# Patient Record
Sex: FEMALE | Race: ASIAN | NOT HISPANIC OR LATINO | URBAN - METROPOLITAN AREA
[De-identification: names, ages, dates, MRNs, and addresses within clinical notes are randomized per-mention and may not be internally consistent; named-entity substitution may affect disease eponyms.]

---

## 2020-02-17 ENCOUNTER — EMERGENCY (EMERGENCY)
Facility: HOSPITAL | Age: 35
LOS: 1 days | Discharge: HOME | End: 2020-02-17
Attending: STUDENT IN AN ORGANIZED HEALTH CARE EDUCATION/TRAINING PROGRAM
Payer: COMMERCIAL

## 2020-02-17 VITALS
HEART RATE: 71 BPM | OXYGEN SATURATION: 100 % | RESPIRATION RATE: 18 BRPM | SYSTOLIC BLOOD PRESSURE: 106 MMHG | DIASTOLIC BLOOD PRESSURE: 74 MMHG

## 2020-02-17 VITALS
WEIGHT: 106.04 LBS | OXYGEN SATURATION: 100 % | DIASTOLIC BLOOD PRESSURE: 78 MMHG | TEMPERATURE: 98 F | RESPIRATION RATE: 20 BRPM | HEART RATE: 93 BPM | SYSTOLIC BLOOD PRESSURE: 115 MMHG | HEIGHT: 61 IN

## 2020-02-17 PROCEDURE — 99283 EMERGENCY DEPT VISIT LOW MDM: CPT

## 2020-02-17 RX ORDER — IBUPROFEN 200 MG
600 TABLET ORAL ONCE
Refills: 0 | Status: COMPLETED | OUTPATIENT
Start: 2020-02-17 | End: 2020-02-17

## 2020-02-17 RX ORDER — ACETAMINOPHEN 500 MG
975 TABLET ORAL ONCE
Refills: 0 | Status: COMPLETED | OUTPATIENT
Start: 2020-02-17 | End: 2020-02-17

## 2020-02-17 RX ADMIN — Medication 975 MILLIGRAM(S): at 16:53

## 2020-02-17 RX ADMIN — Medication 600 MILLIGRAM(S): at 16:53
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
## 2020-02-17 NOTE — ED PROVIDER NOTE - PHYSICAL EXAMINATION
A&Ox3, NAD. NCAT. PERRL, EOMI. Neck supple, no LAD. Lungs CTAB. +S1S2, RRR, No m/r/g. Abd soft, NT/ND, +BS, no rebound or guarding. Extremities: cap refill <2, pulses in distal extremities 4+, no edema. Skin without rash. CN II-XII intact. Strength 5/5 UE/LE. Sensations intact throughout. Gait steady. no pronator drift, No cervical/thoracic/lumbar vertebral body pain to palpation, C-spine with FROM.
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
## 2020-02-17 NOTE — ED PROVIDER NOTE - PATIENT PORTAL LINK FT
You can access the FollowMyHealth Patient Portal offered by [facility] by registering at the following website: [email or web address]. By joining [employer]’s FollowMyHealth portal, you will also be able to view your health information using other applications (apps) compatible with our system.

## 2020-02-17 NOTE — ED PROVIDER NOTE - NSFOLLOWUPINSTRUCTIONS_ED_ALL_ED_FT
- stay hydrated. ice 20mins on, 40mins off in cycle.  - alternate between tylenol 975mg and ibuprofen 600mg every 6 hours as needed for pain-take with meals.  - follow up with your pcp in 1-2 days.    - return if symptoms worsen, fever, weakness, numbness/tingling, blurred vision, difficulty ambulating and all other concerns.

## 2022-02-07 ENCOUNTER — APPOINTMENT (OUTPATIENT)
Dept: OBGYN | Facility: CLINIC | Age: 37
End: 2022-02-07
Payer: MEDICAID

## 2022-02-07 ENCOUNTER — OUTPATIENT (OUTPATIENT)
Dept: OUTPATIENT SERVICES | Facility: HOSPITAL | Age: 37
LOS: 1 days | End: 2022-02-07
Payer: MEDICAID

## 2022-02-07 ENCOUNTER — TRANSCRIPTION ENCOUNTER (OUTPATIENT)
Age: 37
End: 2022-02-07

## 2022-02-07 VITALS
OXYGEN SATURATION: 100 % | SYSTOLIC BLOOD PRESSURE: 117 MMHG | DIASTOLIC BLOOD PRESSURE: 80 MMHG | BODY MASS INDEX: 21.71 KG/M2 | HEIGHT: 61 IN | HEART RATE: 100 BPM | RESPIRATION RATE: 16 BRPM | WEIGHT: 115 LBS | TEMPERATURE: 99.1 F

## 2022-02-07 DIAGNOSIS — Z78.9 OTHER SPECIFIED HEALTH STATUS: ICD-10-CM

## 2022-02-07 DIAGNOSIS — Z82.49 FAMILY HISTORY OF ISCHEMIC HEART DISEASE AND OTHER DISEASES OF THE CIRCULATORY SYSTEM: ICD-10-CM

## 2022-02-07 DIAGNOSIS — Z00.00 ENCOUNTER FOR GENERAL ADULT MEDICAL EXAMINATION WITHOUT ABNORMAL FINDINGS: ICD-10-CM

## 2022-02-07 DIAGNOSIS — Z83.3 FAMILY HISTORY OF DIABETES MELLITUS: ICD-10-CM

## 2022-02-07 DIAGNOSIS — Z87.09 PERSONAL HISTORY OF OTHER DISEASES OF THE RESPIRATORY SYSTEM: ICD-10-CM

## 2022-02-07 PROCEDURE — 36415 COLL VENOUS BLD VENIPUNCTURE: CPT

## 2022-02-07 PROCEDURE — 86900 BLOOD TYPING SEROLOGIC ABO: CPT

## 2022-02-07 PROCEDURE — 87086 URINE CULTURE/COLONY COUNT: CPT

## 2022-02-07 PROCEDURE — 81025 URINE PREGNANCY TEST: CPT

## 2022-02-07 PROCEDURE — G0463: CPT

## 2022-02-07 PROCEDURE — 86901 BLOOD TYPING SEROLOGIC RH(D): CPT

## 2022-02-07 PROCEDURE — 99204 OFFICE O/P NEW MOD 45 MIN: CPT | Mod: 25

## 2022-02-07 PROCEDURE — 84702 CHORIONIC GONADOTROPIN TEST: CPT

## 2022-02-07 PROCEDURE — 85025 COMPLETE CBC W/AUTO DIFF WBC: CPT

## 2022-02-07 PROCEDURE — 86850 RBC ANTIBODY SCREEN: CPT

## 2022-02-07 PROCEDURE — 36415 COLL VENOUS BLD VENIPUNCTURE: CPT | Mod: NC

## 2022-02-07 RX ORDER — MULTIVIT-MIN/FOLIC/VIT K/LYCOP 400-300MCG
28-0.8 TABLET ORAL
Refills: 0 | Status: COMPLETED | COMMUNITY
End: 2022-02-07

## 2022-02-08 LAB
ABO + RH PNL BLD: NORMAL
BASOPHILS # BLD AUTO: 0.04 K/UL
BASOPHILS NFR BLD AUTO: 0.5 %
BLD GP AB SCN SERPL QL: NORMAL
C TRACH RRNA SPEC QL NAA+PROBE: NOT DETECTED
EOSINOPHIL # BLD AUTO: 0.11 K/UL
EOSINOPHIL NFR BLD AUTO: 1.4 %
HCG SERPL-MCNC: 2981 MIU/ML
HCT VFR BLD CALC: 42.2 %
HGB BLD-MCNC: 13.8 G/DL
IMM GRANULOCYTES NFR BLD AUTO: 0.1 %
LYMPHOCYTES # BLD AUTO: 2.02 K/UL
LYMPHOCYTES NFR BLD AUTO: 24.8 %
MAN DIFF?: NORMAL
MCHC RBC-ENTMCNC: 30.3 PG
MCHC RBC-ENTMCNC: 32.7 GM/DL
MCV RBC AUTO: 92.5 FL
MONOCYTES # BLD AUTO: 0.67 K/UL
MONOCYTES NFR BLD AUTO: 8.2 %
N GONORRHOEA RRNA SPEC QL NAA+PROBE: NOT DETECTED
NEUTROPHILS # BLD AUTO: 5.29 K/UL
NEUTROPHILS NFR BLD AUTO: 65 %
PLATELET # BLD AUTO: 220 K/UL
RBC # BLD: 4.56 M/UL
RBC # FLD: 13.2 %
SOURCE AMPLIFICATION: NORMAL
WBC # FLD AUTO: 8.14 K/UL

## 2022-02-09 ENCOUNTER — OUTPATIENT (OUTPATIENT)
Dept: OUTPATIENT SERVICES | Facility: HOSPITAL | Age: 37
LOS: 1 days | End: 2022-02-09
Payer: MEDICAID

## 2022-02-09 DIAGNOSIS — Z01.419 ENCOUNTER FOR GYNECOLOGICAL EXAMINATION (GENERAL) (ROUTINE) WITHOUT ABNORMAL FINDINGS: ICD-10-CM

## 2022-02-09 DIAGNOSIS — Z3A.01 LESS THAN 8 WEEKS GESTATION OF PREGNANCY: ICD-10-CM

## 2022-02-09 DIAGNOSIS — N93.9 ABNORMAL UTERINE AND VAGINAL BLEEDING, UNSPECIFIED: ICD-10-CM

## 2022-02-09 DIAGNOSIS — Z78.9 OTHER SPECIFIED HEALTH STATUS: ICD-10-CM

## 2022-02-09 DIAGNOSIS — Z34.90 ENCOUNTER FOR SUPERVISION OF NORMAL PREGNANCY, UNSPECIFIED, UNSPECIFIED TRIMESTER: ICD-10-CM

## 2022-02-09 DIAGNOSIS — Z11.3 ENCOUNTER FOR SCREENING FOR INFECTIONS WITH A PREDOMINANTLY SEXUAL MODE OF TRANSMISSION: ICD-10-CM

## 2022-02-09 DIAGNOSIS — R10.2 PELVIC AND PERINEAL PAIN: ICD-10-CM

## 2022-02-09 LAB — BACTERIA UR CULT: NORMAL

## 2022-02-09 PROCEDURE — 84702 CHORIONIC GONADOTROPIN TEST: CPT

## 2022-02-09 NOTE — HISTORY OF PRESENT ILLNESS
[Normal Amount/Duration] :  normal amount and duration [Regular Cycle Intervals] : periods have been regular [No] : Patient does not have concerns regarding sex [Currently Active] : currently active [FreeTextEntry1] : 1/3/2022

## 2022-02-10 LAB — HCG SERPL-MCNC: 5715 MIU/ML

## 2022-02-14 ENCOUNTER — OUTPATIENT (OUTPATIENT)
Dept: OUTPATIENT SERVICES | Facility: HOSPITAL | Age: 37
LOS: 1 days | End: 2022-02-14
Payer: MEDICAID

## 2022-02-14 DIAGNOSIS — Z00.00 ENCOUNTER FOR GENERAL ADULT MEDICAL EXAMINATION WITHOUT ABNORMAL FINDINGS: ICD-10-CM

## 2022-02-14 PROCEDURE — 84702 CHORIONIC GONADOTROPIN TEST: CPT

## 2022-02-16 ENCOUNTER — OUTPATIENT (OUTPATIENT)
Dept: OUTPATIENT SERVICES | Facility: HOSPITAL | Age: 37
LOS: 1 days | End: 2022-02-16
Payer: MEDICAID

## 2022-02-16 DIAGNOSIS — Z00.00 ENCOUNTER FOR GENERAL ADULT MEDICAL EXAMINATION WITHOUT ABNORMAL FINDINGS: ICD-10-CM

## 2022-02-16 LAB — HCG SERPL-MCNC: ABNORMAL MIU/ML

## 2022-02-16 PROCEDURE — 84702 CHORIONIC GONADOTROPIN TEST: CPT

## 2022-02-17 LAB — HCG SERPL-MCNC: ABNORMAL MIU/ML

## 2022-02-18 ENCOUNTER — APPOINTMENT (OUTPATIENT)
Dept: ULTRASOUND IMAGING | Facility: HOSPITAL | Age: 37
End: 2022-02-18
Payer: MEDICAID

## 2022-02-18 ENCOUNTER — OUTPATIENT (OUTPATIENT)
Dept: OUTPATIENT SERVICES | Facility: HOSPITAL | Age: 37
LOS: 1 days | End: 2022-02-18
Payer: MEDICAID

## 2022-02-18 DIAGNOSIS — Z3A.01 LESS THAN 8 WEEKS GESTATION OF PREGNANCY: ICD-10-CM

## 2022-02-18 PROCEDURE — 76856 US EXAM PELVIC COMPLETE: CPT | Mod: 26

## 2022-02-18 PROCEDURE — 76817 TRANSVAGINAL US OBSTETRIC: CPT

## 2022-02-18 PROCEDURE — 76830 TRANSVAGINAL US NON-OB: CPT | Mod: 26

## 2022-02-18 PROCEDURE — 76815 OB US LIMITED FETUS(S): CPT

## 2022-02-18 PROCEDURE — 76830 TRANSVAGINAL US NON-OB: CPT

## 2022-02-18 PROCEDURE — 76856 US EXAM PELVIC COMPLETE: CPT

## 2022-02-22 ENCOUNTER — OUTPATIENT (OUTPATIENT)
Dept: OUTPATIENT SERVICES | Facility: HOSPITAL | Age: 37
LOS: 1 days | End: 2022-02-22
Payer: MEDICAID

## 2022-02-22 DIAGNOSIS — Z00.00 ENCOUNTER FOR GENERAL ADULT MEDICAL EXAMINATION WITHOUT ABNORMAL FINDINGS: ICD-10-CM

## 2022-02-22 PROCEDURE — 84702 CHORIONIC GONADOTROPIN TEST: CPT

## 2022-02-23 ENCOUNTER — OUTPATIENT (OUTPATIENT)
Dept: OUTPATIENT SERVICES | Facility: HOSPITAL | Age: 37
LOS: 1 days | End: 2022-02-23
Payer: MEDICAID

## 2022-02-23 ENCOUNTER — APPOINTMENT (OUTPATIENT)
Dept: OBGYN | Facility: CLINIC | Age: 37
End: 2022-02-23
Payer: MEDICAID

## 2022-02-23 VITALS
OXYGEN SATURATION: 97 % | HEIGHT: 61 IN | HEART RATE: 102 BPM | RESPIRATION RATE: 18 BRPM | TEMPERATURE: 97.1 F | WEIGHT: 117 LBS | BODY MASS INDEX: 22.09 KG/M2 | DIASTOLIC BLOOD PRESSURE: 70 MMHG | SYSTOLIC BLOOD PRESSURE: 112 MMHG

## 2022-02-23 DIAGNOSIS — Z00.00 ENCOUNTER FOR GENERAL ADULT MEDICAL EXAMINATION WITHOUT ABNORMAL FINDINGS: ICD-10-CM

## 2022-02-23 LAB — HCG SERPL-MCNC: ABNORMAL MIU/ML

## 2022-02-23 PROCEDURE — 99214 OFFICE O/P EST MOD 30 MIN: CPT

## 2022-02-23 PROCEDURE — G0463: CPT

## 2022-02-23 NOTE — HISTORY OF PRESENT ILLNESS
[FreeTextEntry1] : Presents for f/u of serial bHCG weekly and Pelvic ultrasound (02/18/22):\par \par Serial bHCG increasing appropriately - last drawn(02/23/22)~67,851 mIU/mL\par \par Pelvic ultrasound(02/18/22) (+) IUP ~ 5.6 weeks with ROXANN(10/14/22) consistent with ROXANN by LMP - results reviewed today with patient and reassured of findings. \par \par Patient denies any further vaginal bleeding since last Gyn visit, taking PNV daily

## 2022-02-24 DIAGNOSIS — Z71.2 PERSON CONSULTING FOR EXPLANATION OF EXAMINATION OR TEST FINDINGS: ICD-10-CM

## 2022-02-24 DIAGNOSIS — Z3A.01 LESS THAN 8 WEEKS GESTATION OF PREGNANCY: ICD-10-CM

## 2022-03-15 ENCOUNTER — APPOINTMENT (OUTPATIENT)
Dept: ANTEPARTUM | Facility: CLINIC | Age: 37
End: 2022-03-15
Payer: MEDICAID

## 2022-03-15 ENCOUNTER — ASOB RESULT (OUTPATIENT)
Age: 37
End: 2022-03-15

## 2022-03-15 PROCEDURE — 76815 OB US LIMITED FETUS(S): CPT

## 2022-03-23 ENCOUNTER — APPOINTMENT (OUTPATIENT)
Dept: OBGYN | Facility: CLINIC | Age: 37
End: 2022-03-23
Payer: MEDICAID

## 2022-03-23 ENCOUNTER — OUTPATIENT (OUTPATIENT)
Dept: OUTPATIENT SERVICES | Facility: HOSPITAL | Age: 37
LOS: 1 days | End: 2022-03-23
Payer: MEDICAID

## 2022-03-23 VITALS
SYSTOLIC BLOOD PRESSURE: 122 MMHG | HEIGHT: 61 IN | HEART RATE: 108 BPM | BODY MASS INDEX: 23.03 KG/M2 | DIASTOLIC BLOOD PRESSURE: 80 MMHG | TEMPERATURE: 98.2 F | WEIGHT: 122 LBS

## 2022-03-23 DIAGNOSIS — Z34.00 ENCOUNTER FOR SUPERVISION OF NORMAL FIRST PREGNANCY, UNSPECIFIED TRIMESTER: ICD-10-CM

## 2022-03-23 PROCEDURE — 81243 FMR1 GEN ALY DETC ABNL ALLEL: CPT

## 2022-03-23 PROCEDURE — 81220 CFTR GENE COM VARIANTS: CPT

## 2022-03-23 PROCEDURE — 83036 HEMOGLOBIN GLYCOSYLATED A1C: CPT

## 2022-03-23 PROCEDURE — 86762 RUBELLA ANTIBODY: CPT

## 2022-03-23 PROCEDURE — 83655 ASSAY OF LEAD: CPT

## 2022-03-23 PROCEDURE — 36415 COLL VENOUS BLD VENIPUNCTURE: CPT

## 2022-03-23 PROCEDURE — 80053 COMPREHEN METABOLIC PANEL: CPT

## 2022-03-23 PROCEDURE — 84439 ASSAY OF FREE THYROXINE: CPT

## 2022-03-23 PROCEDURE — 83020 HEMOGLOBIN ELECTROPHORESIS: CPT

## 2022-03-23 PROCEDURE — 99214 OFFICE O/P EST MOD 30 MIN: CPT | Mod: 25

## 2022-03-23 PROCEDURE — 84443 ASSAY THYROID STIM HORMONE: CPT

## 2022-03-23 PROCEDURE — 87591 N.GONORRHOEAE DNA AMP PROB: CPT

## 2022-03-23 PROCEDURE — 86481 TB AG RESPONSE T-CELL SUSP: CPT

## 2022-03-23 PROCEDURE — 87389 HIV-1 AG W/HIV-1&-2 AB AG IA: CPT

## 2022-03-23 PROCEDURE — G0463: CPT

## 2022-03-23 PROCEDURE — 85025 COMPLETE CBC W/AUTO DIFF WBC: CPT

## 2022-03-23 PROCEDURE — 87340 HEPATITIS B SURFACE AG IA: CPT

## 2022-03-23 PROCEDURE — 86780 TREPONEMA PALLIDUM: CPT

## 2022-03-23 PROCEDURE — 87086 URINE CULTURE/COLONY COUNT: CPT

## 2022-03-23 PROCEDURE — 87624 HPV HI-RISK TYP POOLED RSLT: CPT

## 2022-03-23 PROCEDURE — 81003 URINALYSIS AUTO W/O SCOPE: CPT

## 2022-03-23 PROCEDURE — 86787 VARICELLA-ZOSTER ANTIBODY: CPT

## 2022-03-23 PROCEDURE — 81025 URINE PREGNANCY TEST: CPT

## 2022-03-23 PROCEDURE — 86803 HEPATITIS C AB TEST: CPT

## 2022-03-23 PROCEDURE — 81329 SMN1 GENE DOS/DELETION ALYS: CPT

## 2022-03-23 PROCEDURE — 36415 COLL VENOUS BLD VENIPUNCTURE: CPT | Mod: NC

## 2022-03-23 PROCEDURE — 86765 RUBEOLA ANTIBODY: CPT

## 2022-03-23 PROCEDURE — 87491 CHLMYD TRACH DNA AMP PROBE: CPT

## 2022-03-25 DIAGNOSIS — Z11.3 ENCOUNTER FOR SCREENING FOR INFECTIONS WITH A PREDOMINANTLY SEXUAL MODE OF TRANSMISSION: ICD-10-CM

## 2022-03-25 DIAGNOSIS — Z34.91 ENCOUNTER FOR SUPERVISION OF NORMAL PREGNANCY, UNSPECIFIED, FIRST TRIMESTER: ICD-10-CM

## 2022-03-25 DIAGNOSIS — Z3A.11 11 WEEKS GESTATION OF PREGNANCY: ICD-10-CM

## 2022-03-25 DIAGNOSIS — Z12.39 ENCOUNTER FOR OTHER SCREENING FOR MALIGNANT NEOPLASM OF BREAST: ICD-10-CM

## 2022-03-25 DIAGNOSIS — Z86.59 PERSONAL HISTORY OF OTHER MENTAL AND BEHAVIORAL DISORDERS: ICD-10-CM

## 2022-03-28 LAB
ALBUMIN SERPL ELPH-MCNC: 4.8 G/DL
ALP BLD-CCNC: 65 U/L
ALT SERPL-CCNC: 26 U/L
ANION GAP SERPL CALC-SCNC: 14 MMOL/L
AST SERPL-CCNC: 22 U/L
BACTERIA UR CULT: NORMAL
BASOPHILS # BLD AUTO: 0.03 K/UL
BASOPHILS NFR BLD AUTO: 0.3 %
BILIRUB SERPL-MCNC: 0.2 MG/DL
BUN SERPL-MCNC: 7 MG/DL
C TRACH RRNA SPEC QL NAA+PROBE: NOT DETECTED
CALCIUM SERPL-MCNC: 9.8 MG/DL
CHLORIDE SERPL-SCNC: 104 MMOL/L
CO2 SERPL-SCNC: 21 MMOL/L
CREAT SERPL-MCNC: 0.46 MG/DL
EGFR: 128 ML/MIN/1.73M2
EOSINOPHIL # BLD AUTO: 0.08 K/UL
EOSINOPHIL NFR BLD AUTO: 0.8 %
ESTIMATED AVERAGE GLUCOSE: 88 MG/DL
GLUCOSE SERPL-MCNC: 80 MG/DL
HBA1C MFR BLD HPLC: 4.7 %
HBV SURFACE AG SER QL: NONREACTIVE
HCT VFR BLD CALC: 37.7 %
HCV AB SER QL: NONREACTIVE
HCV S/CO RATIO: 0.13 S/CO
HGB A MFR BLD: 97 %
HGB A2 MFR BLD: 3 %
HGB BLD-MCNC: 12.1 G/DL
HGB FRACT BLD-IMP: NORMAL
HIV1+2 AB SPEC QL IA.RAPID: NONREACTIVE
HPV HIGH+LOW RISK DNA PNL CVX: NOT DETECTED
IMM GRANULOCYTES NFR BLD AUTO: 0.3 %
LEAD BLD-MCNC: <1 UG/DL
LYMPHOCYTES # BLD AUTO: 1.86 K/UL
LYMPHOCYTES NFR BLD AUTO: 19 %
M TB IFN-G BLD-IMP: NEGATIVE
MAN DIFF?: NORMAL
MCHC RBC-ENTMCNC: 30.8 PG
MCHC RBC-ENTMCNC: 32.1 GM/DL
MCV RBC AUTO: 95.9 FL
MEV IGG FLD QL IA: >300 AU/ML
MEV IGG+IGM SER-IMP: POSITIVE
MONOCYTES # BLD AUTO: 0.78 K/UL
MONOCYTES NFR BLD AUTO: 8 %
N GONORRHOEA RRNA SPEC QL NAA+PROBE: NOT DETECTED
NEUTROPHILS # BLD AUTO: 6.99 K/UL
NEUTROPHILS NFR BLD AUTO: 71.6 %
PLATELET # BLD AUTO: 213 K/UL
POTASSIUM SERPL-SCNC: 3.4 MMOL/L
PROT SERPL-MCNC: 7.4 G/DL
QUANTIFERON TB PLUS MITOGEN MINUS NIL: 5.19 IU/ML
QUANTIFERON TB PLUS NIL: 4.81 IU/ML
QUANTIFERON TB PLUS TB1 MINUS NIL: -4.76 IU/ML
QUANTIFERON TB PLUS TB2 MINUS NIL: -4.73 IU/ML
RBC # BLD: 3.93 M/UL
RBC # FLD: 13.4 %
RUBV IGG FLD-ACNC: 31 INDEX
RUBV IGG SER-IMP: POSITIVE
SODIUM SERPL-SCNC: 139 MMOL/L
SOURCE TP AMPLIFICATION: NORMAL
T PALLIDUM AB SER QL IA: NEGATIVE
T4 FREE SERPL-MCNC: 1.3 NG/DL
TSH SERPL-ACNC: 0.6 UIU/ML
VZV AB TITR SER: NEGATIVE
VZV IGG SER IF-ACNC: <10 INDEX
WBC # FLD AUTO: 9.77 K/UL

## 2022-03-30 LAB
AR GENE MUT ANL BLD/T: NORMAL
FMR1 GENE MUT ANL BLD/T: NORMAL

## 2022-03-31 ENCOUNTER — APPOINTMENT (OUTPATIENT)
Dept: ANTEPARTUM | Facility: CLINIC | Age: 37
End: 2022-03-31

## 2022-04-01 LAB
CFTR MUT TESTED BLD/T: NEGATIVE
CYTOLOGY CVX/VAG DOC THIN PREP: NORMAL

## 2022-04-22 ENCOUNTER — APPOINTMENT (OUTPATIENT)
Dept: OBGYN | Facility: CLINIC | Age: 37
End: 2022-04-22

## 2022-04-27 ENCOUNTER — APPOINTMENT (OUTPATIENT)
Dept: OBGYN | Facility: CLINIC | Age: 37
End: 2022-04-27
Payer: MEDICAID

## 2022-04-27 VITALS
HEIGHT: 61 IN | TEMPERATURE: 98.3 F | RESPIRATION RATE: 18 BRPM | OXYGEN SATURATION: 99 % | DIASTOLIC BLOOD PRESSURE: 80 MMHG | SYSTOLIC BLOOD PRESSURE: 120 MMHG | WEIGHT: 121 LBS | HEART RATE: 111 BPM | BODY MASS INDEX: 22.84 KG/M2

## 2022-04-27 DIAGNOSIS — R10.2 PELVIC AND PERINEAL PAIN: ICD-10-CM

## 2022-04-27 DIAGNOSIS — N93.9 ABNORMAL UTERINE AND VAGINAL BLEEDING, UNSPECIFIED: ICD-10-CM

## 2022-04-27 DIAGNOSIS — Z3A.11 11 WEEKS GESTATION OF PREGNANCY: ICD-10-CM

## 2022-04-27 DIAGNOSIS — Z34.90 ENCOUNTER FOR SUPERVISION OF NORMAL PREGNANCY, UNSPECIFIED, UNSPECIFIED TRIMESTER: ICD-10-CM

## 2022-04-27 DIAGNOSIS — Z71.2 PERSON CONSULTING FOR EXPLANATION OF EXAMINATION OR TEST FINDINGS: ICD-10-CM

## 2022-04-27 DIAGNOSIS — Z3A.01 LESS THAN 8 WEEKS GESTATION OF PREGNANCY: ICD-10-CM

## 2022-04-27 DIAGNOSIS — Z12.39 ENCOUNTER FOR OTHER SCREENING FOR MALIGNANT NEOPLASM OF BREAST: ICD-10-CM

## 2022-04-27 DIAGNOSIS — Z11.3 ENCOUNTER FOR SCREENING FOR INFECTIONS WITH A PREDOMINANTLY SEXUAL MODE OF TRANSMISSION: ICD-10-CM

## 2022-04-27 PROCEDURE — 0500F INITIAL PRENATAL CARE VISIT: CPT

## 2022-04-27 PROCEDURE — 36415 COLL VENOUS BLD VENIPUNCTURE: CPT

## 2022-05-02 LAB
2ND TRIMESTER DATA: NORMAL
AFP PNL SERPL: NORMAL
AFP SERPL-ACNC: NORMAL
CLINICAL BIOCHEMIST REVIEW: NORMAL
NOTES NTD: NORMAL

## 2022-05-13 ENCOUNTER — NON-APPOINTMENT (OUTPATIENT)
Age: 37
End: 2022-05-13

## 2022-05-24 ENCOUNTER — APPOINTMENT (OUTPATIENT)
Dept: ANTEPARTUM | Facility: CLINIC | Age: 37
End: 2022-05-24

## 2022-05-26 ENCOUNTER — ASOB RESULT (OUTPATIENT)
Age: 37
End: 2022-05-26

## 2022-05-26 ENCOUNTER — APPOINTMENT (OUTPATIENT)
Dept: ANTEPARTUM | Facility: CLINIC | Age: 37
End: 2022-05-26
Payer: MEDICAID

## 2022-05-26 PROCEDURE — 76811 OB US DETAILED SNGL FETUS: CPT

## 2022-06-02 ENCOUNTER — NON-APPOINTMENT (OUTPATIENT)
Age: 37
End: 2022-06-02

## 2022-06-02 ENCOUNTER — APPOINTMENT (OUTPATIENT)
Dept: OBGYN | Facility: CLINIC | Age: 37
End: 2022-06-02
Payer: MEDICAID

## 2022-06-02 VITALS
HEART RATE: 111 BPM | HEIGHT: 61 IN | OXYGEN SATURATION: 96 % | RESPIRATION RATE: 14 BRPM | SYSTOLIC BLOOD PRESSURE: 114 MMHG | DIASTOLIC BLOOD PRESSURE: 74 MMHG | BODY MASS INDEX: 24.73 KG/M2 | TEMPERATURE: 97.1 F | WEIGHT: 131 LBS

## 2022-06-02 LAB
CLARI ADDITIONAL INFO: NORMAL
CLARI CHROMOSOME 13: NORMAL
CLARI CHROMOSOME 18: NORMAL
CLARI CHROMOSOME 21: NORMAL
CLARI SEX CHROMOSOMES: NORMAL
CLARI TEST COMMENT: NORMAL
CLARITEST NIPT: NORMAL
FETAL FRACT: NORMAL
GESTATION AGE: NORMAL
MATERNAL WEIGHT (LBS):: NORMAL
PLEASE INCLUDE GENDER RESULTS ON THIS REPORT:: NORMAL
TYPE OF PREGNANCY:: NORMAL

## 2022-06-02 PROCEDURE — 0502F SUBSEQUENT PRENATAL CARE: CPT

## 2022-06-06 LAB — BACTERIA UR CULT: NORMAL

## 2022-06-23 ENCOUNTER — APPOINTMENT (OUTPATIENT)
Dept: OBGYN | Facility: CLINIC | Age: 37
End: 2022-06-23
Payer: MEDICAID

## 2022-06-23 VITALS
RESPIRATION RATE: 12 BRPM | BODY MASS INDEX: 25.86 KG/M2 | DIASTOLIC BLOOD PRESSURE: 79 MMHG | WEIGHT: 137 LBS | TEMPERATURE: 98.2 F | SYSTOLIC BLOOD PRESSURE: 120 MMHG | HEIGHT: 61 IN | HEART RATE: 97 BPM | OXYGEN SATURATION: 98 %

## 2022-06-23 PROCEDURE — 0502F SUBSEQUENT PRENATAL CARE: CPT

## 2022-07-08 LAB
BASOPHILS # BLD AUTO: 0.03 K/UL
BASOPHILS NFR BLD AUTO: 0.3 %
EOSINOPHIL # BLD AUTO: 0.1 K/UL
EOSINOPHIL NFR BLD AUTO: 1.1 %
GLUCOSE 1H P 50 G GLC PO SERPL-MCNC: 173 MG/DL
HCT VFR BLD CALC: 33 %
HGB BLD-MCNC: 10.5 G/DL
IMM GRANULOCYTES NFR BLD AUTO: 0.5 %
LYMPHOCYTES # BLD AUTO: 1.62 K/UL
LYMPHOCYTES NFR BLD AUTO: 17.6 %
MAN DIFF?: NORMAL
MCHC RBC-ENTMCNC: 30.1 PG
MCHC RBC-ENTMCNC: 31.8 GM/DL
MCV RBC AUTO: 94.6 FL
MONOCYTES # BLD AUTO: 0.63 K/UL
MONOCYTES NFR BLD AUTO: 6.9 %
NEUTROPHILS # BLD AUTO: 6.75 K/UL
NEUTROPHILS NFR BLD AUTO: 73.6 %
PLATELET # BLD AUTO: 177 K/UL
RBC # BLD: 3.49 M/UL
RBC # FLD: 13.6 %
WBC # FLD AUTO: 9.18 K/UL

## 2022-07-15 ENCOUNTER — LABORATORY RESULT (OUTPATIENT)
Age: 37
End: 2022-07-15

## 2022-07-18 ENCOUNTER — NON-APPOINTMENT (OUTPATIENT)
Age: 37
End: 2022-07-18

## 2022-07-25 ENCOUNTER — APPOINTMENT (OUTPATIENT)
Dept: OBGYN | Facility: CLINIC | Age: 37
End: 2022-07-25

## 2022-07-25 ENCOUNTER — NON-APPOINTMENT (OUTPATIENT)
Age: 37
End: 2022-07-25

## 2022-07-25 VITALS
SYSTOLIC BLOOD PRESSURE: 115 MMHG | TEMPERATURE: 98.4 F | BODY MASS INDEX: 26.62 KG/M2 | OXYGEN SATURATION: 98 % | RESPIRATION RATE: 12 BRPM | HEART RATE: 91 BPM | DIASTOLIC BLOOD PRESSURE: 74 MMHG | HEIGHT: 61 IN | WEIGHT: 141 LBS

## 2022-07-25 DIAGNOSIS — Z34.92 ENCOUNTER FOR SUPERVISION OF NORMAL PREGNANCY, UNSPECIFIED, SECOND TRIMESTER: ICD-10-CM

## 2022-07-25 PROCEDURE — 0502F SUBSEQUENT PRENATAL CARE: CPT

## 2022-08-08 ENCOUNTER — APPOINTMENT (OUTPATIENT)
Dept: MATERNAL FETAL MEDICINE | Facility: CLINIC | Age: 37
End: 2022-08-08

## 2022-08-08 ENCOUNTER — ASOB RESULT (OUTPATIENT)
Age: 37
End: 2022-08-08

## 2022-08-08 PROCEDURE — G0109 DIAB MANAGE TRN IND/GROUP: CPT | Mod: 95

## 2022-08-15 ENCOUNTER — APPOINTMENT (OUTPATIENT)
Dept: OBGYN | Facility: CLINIC | Age: 37
End: 2022-08-15

## 2022-08-15 VITALS
OXYGEN SATURATION: 97 % | WEIGHT: 141 LBS | DIASTOLIC BLOOD PRESSURE: 72 MMHG | TEMPERATURE: 98.5 F | BODY MASS INDEX: 26.62 KG/M2 | RESPIRATION RATE: 12 BRPM | SYSTOLIC BLOOD PRESSURE: 116 MMHG | HEART RATE: 93 BPM | HEIGHT: 61 IN

## 2022-08-15 PROCEDURE — 0502F SUBSEQUENT PRENATAL CARE: CPT

## 2022-08-16 ENCOUNTER — ASOB RESULT (OUTPATIENT)
Age: 37
End: 2022-08-16

## 2022-08-16 ENCOUNTER — APPOINTMENT (OUTPATIENT)
Dept: MATERNAL FETAL MEDICINE | Facility: CLINIC | Age: 37
End: 2022-08-16

## 2022-08-16 PROCEDURE — G0108 DIAB MANAGE TRN  PER INDIV: CPT | Mod: 95

## 2022-08-22 ENCOUNTER — APPOINTMENT (OUTPATIENT)
Dept: OBGYN | Facility: CLINIC | Age: 37
End: 2022-08-22

## 2022-08-22 ENCOUNTER — ASOB RESULT (OUTPATIENT)
Age: 37
End: 2022-08-22

## 2022-08-22 VITALS
WEIGHT: 144 LBS | OXYGEN SATURATION: 96 % | SYSTOLIC BLOOD PRESSURE: 113 MMHG | DIASTOLIC BLOOD PRESSURE: 70 MMHG | HEIGHT: 61 IN | RESPIRATION RATE: 14 BRPM | HEART RATE: 76 BPM | BODY MASS INDEX: 27.19 KG/M2 | TEMPERATURE: 98.1 F

## 2022-08-22 PROCEDURE — 76816 OB US FOLLOW-UP PER FETUS: CPT

## 2022-08-22 PROCEDURE — 0502F SUBSEQUENT PRENATAL CARE: CPT

## 2022-08-29 ENCOUNTER — APPOINTMENT (OUTPATIENT)
Dept: OBGYN | Facility: CLINIC | Age: 37
End: 2022-08-29

## 2022-09-07 ENCOUNTER — ASOB RESULT (OUTPATIENT)
Age: 37
End: 2022-09-07

## 2022-09-07 ENCOUNTER — APPOINTMENT (OUTPATIENT)
Dept: MATERNAL FETAL MEDICINE | Facility: CLINIC | Age: 37
End: 2022-09-07

## 2022-09-07 PROCEDURE — G0108 DIAB MANAGE TRN  PER INDIV: CPT | Mod: 95

## 2022-09-08 ENCOUNTER — APPOINTMENT (OUTPATIENT)
Dept: OBGYN | Facility: CLINIC | Age: 37
End: 2022-09-08

## 2022-09-08 VITALS
HEIGHT: 61 IN | RESPIRATION RATE: 14 BRPM | OXYGEN SATURATION: 97 % | DIASTOLIC BLOOD PRESSURE: 75 MMHG | WEIGHT: 148 LBS | SYSTOLIC BLOOD PRESSURE: 117 MMHG | TEMPERATURE: 97.9 F | BODY MASS INDEX: 27.94 KG/M2 | HEART RATE: 97 BPM

## 2022-09-08 DIAGNOSIS — K21.9 GASTRO-ESOPHAGEAL REFLUX DISEASE W/OUT ESOPHAGITIS: ICD-10-CM

## 2022-09-08 PROCEDURE — 0502F SUBSEQUENT PRENATAL CARE: CPT

## 2022-09-09 PROBLEM — K21.9 GERD (GASTROESOPHAGEAL REFLUX DISEASE): Status: ACTIVE | Noted: 2022-04-27

## 2022-09-15 ENCOUNTER — APPOINTMENT (OUTPATIENT)
Dept: OBGYN | Facility: CLINIC | Age: 37
End: 2022-09-15

## 2022-09-15 VITALS
DIASTOLIC BLOOD PRESSURE: 83 MMHG | TEMPERATURE: 98.1 F | OXYGEN SATURATION: 98 % | BODY MASS INDEX: 27.75 KG/M2 | RESPIRATION RATE: 14 BRPM | SYSTOLIC BLOOD PRESSURE: 134 MMHG | HEART RATE: 97 BPM | WEIGHT: 147 LBS | HEIGHT: 61 IN

## 2022-09-15 DIAGNOSIS — Z86.59 PERSONAL HISTORY OF OTHER MENTAL AND BEHAVIORAL DISORDERS: ICD-10-CM

## 2022-09-15 PROCEDURE — 0502F SUBSEQUENT PRENATAL CARE: CPT

## 2022-09-15 PROCEDURE — 36415 COLL VENOUS BLD VENIPUNCTURE: CPT

## 2022-09-16 PROBLEM — Z86.59 HISTORY OF DEPRESSION: Status: ACTIVE | Noted: 2022-03-23

## 2022-09-16 LAB
BASOPHILS # BLD AUTO: 0.04 K/UL
BASOPHILS NFR BLD AUTO: 0.5 %
C TRACH RRNA SPEC QL NAA+PROBE: NOT DETECTED
EOSINOPHIL # BLD AUTO: 0.12 K/UL
EOSINOPHIL NFR BLD AUTO: 1.5 %
HCT VFR BLD CALC: 32.6 %
HGB BLD-MCNC: 10.7 G/DL
HIV1+2 AB SPEC QL IA.RAPID: NONREACTIVE
IMM GRANULOCYTES NFR BLD AUTO: 0.6 %
LYMPHOCYTES # BLD AUTO: 1.24 K/UL
LYMPHOCYTES NFR BLD AUTO: 15.8 %
MAN DIFF?: NORMAL
MCHC RBC-ENTMCNC: 30.5 PG
MCHC RBC-ENTMCNC: 32.8 GM/DL
MCV RBC AUTO: 92.9 FL
MONOCYTES # BLD AUTO: 0.68 K/UL
MONOCYTES NFR BLD AUTO: 8.7 %
N GONORRHOEA RRNA SPEC QL NAA+PROBE: NOT DETECTED
NEUTROPHILS # BLD AUTO: 5.7 K/UL
NEUTROPHILS NFR BLD AUTO: 72.9 %
PLATELET # BLD AUTO: 158 K/UL
RBC # BLD: 3.51 M/UL
RBC # FLD: 14.4 %
SOURCE AMPLIFICATION: NORMAL
WBC # FLD AUTO: 7.83 K/UL

## 2022-09-18 LAB
GP B STREP DNA SPEC QL NAA+PROBE: DETECTED
GP B STREP DNA SPEC QL NAA+PROBE: NORMAL
SOURCE GBS: NORMAL

## 2022-09-19 ENCOUNTER — APPOINTMENT (OUTPATIENT)
Dept: OBGYN | Facility: CLINIC | Age: 37
End: 2022-09-19

## 2022-09-19 VITALS
HEIGHT: 61 IN | DIASTOLIC BLOOD PRESSURE: 86 MMHG | TEMPERATURE: 98.1 F | OXYGEN SATURATION: 96 % | RESPIRATION RATE: 16 BRPM | HEART RATE: 96 BPM | BODY MASS INDEX: 28.32 KG/M2 | SYSTOLIC BLOOD PRESSURE: 131 MMHG | WEIGHT: 150 LBS

## 2022-09-19 PROCEDURE — 0502F SUBSEQUENT PRENATAL CARE: CPT

## 2022-09-23 ENCOUNTER — ASOB RESULT (OUTPATIENT)
Age: 37
End: 2022-09-23

## 2022-09-23 ENCOUNTER — APPOINTMENT (OUTPATIENT)
Dept: MATERNAL FETAL MEDICINE | Facility: CLINIC | Age: 37
End: 2022-09-23

## 2022-09-23 PROCEDURE — G0108 DIAB MANAGE TRN  PER INDIV: CPT | Mod: 95

## 2022-09-27 ENCOUNTER — OUTPATIENT (OUTPATIENT)
Dept: OUTPATIENT SERVICES | Facility: HOSPITAL | Age: 37
LOS: 1 days | End: 2022-09-27
Payer: MEDICAID

## 2022-09-27 DIAGNOSIS — O24.419 GESTATIONAL DIABETES MELLITUS IN PREGNANCY, UNSPECIFIED CONTROL: ICD-10-CM

## 2022-09-27 DIAGNOSIS — Z01.818 ENCOUNTER FOR OTHER PREPROCEDURAL EXAMINATION: ICD-10-CM

## 2022-09-27 LAB
ANION GAP SERPL CALC-SCNC: 8 MMOL/L — SIGNIFICANT CHANGE UP (ref 5–17)
APTT BLD: 32.1 SEC — SIGNIFICANT CHANGE UP (ref 27.5–35.5)
BLD GP AB SCN SERPL QL: SIGNIFICANT CHANGE UP
BUN SERPL-MCNC: 5 MG/DL — LOW (ref 7–18)
CALCIUM SERPL-MCNC: 8.1 MG/DL — LOW (ref 8.4–10.5)
CHLORIDE SERPL-SCNC: 110 MMOL/L — HIGH (ref 96–108)
CO2 SERPL-SCNC: 23 MMOL/L — SIGNIFICANT CHANGE UP (ref 22–31)
CREAT SERPL-MCNC: 0.46 MG/DL — LOW (ref 0.5–1.3)
EGFR: 128 ML/MIN/1.73M2 — SIGNIFICANT CHANGE UP
GLUCOSE SERPL-MCNC: 67 MG/DL — LOW (ref 70–99)
HCT VFR BLD CALC: 33.9 % — LOW (ref 34.5–45)
HGB BLD-MCNC: 10.9 G/DL — LOW (ref 11.5–15.5)
INR BLD: 0.91 RATIO — SIGNIFICANT CHANGE UP (ref 0.88–1.16)
MCHC RBC-ENTMCNC: 30.1 PG — SIGNIFICANT CHANGE UP (ref 27–34)
MCHC RBC-ENTMCNC: 32.2 GM/DL — SIGNIFICANT CHANGE UP (ref 32–36)
MCV RBC AUTO: 93.6 FL — SIGNIFICANT CHANGE UP (ref 80–100)
NRBC # BLD: 0 /100 WBCS — SIGNIFICANT CHANGE UP (ref 0–0)
PLATELET # BLD AUTO: 177 K/UL — SIGNIFICANT CHANGE UP (ref 150–400)
POTASSIUM SERPL-MCNC: 3.5 MMOL/L — SIGNIFICANT CHANGE UP (ref 3.5–5.3)
POTASSIUM SERPL-SCNC: 3.5 MMOL/L — SIGNIFICANT CHANGE UP (ref 3.5–5.3)
PROTHROM AB SERPL-ACNC: 10.8 SEC — SIGNIFICANT CHANGE UP (ref 10.5–13.4)
RBC # BLD: 3.62 M/UL — LOW (ref 3.8–5.2)
RBC # FLD: 14.3 % — SIGNIFICANT CHANGE UP (ref 10.3–14.5)
SODIUM SERPL-SCNC: 141 MMOL/L — SIGNIFICANT CHANGE UP (ref 135–145)
WBC # BLD: 8.67 K/UL — SIGNIFICANT CHANGE UP (ref 3.8–10.5)
WBC # FLD AUTO: 8.67 K/UL — SIGNIFICANT CHANGE UP (ref 3.8–10.5)

## 2022-09-27 PROCEDURE — 85730 THROMBOPLASTIN TIME PARTIAL: CPT

## 2022-09-27 PROCEDURE — 86780 TREPONEMA PALLIDUM: CPT

## 2022-09-27 PROCEDURE — 86900 BLOOD TYPING SEROLOGIC ABO: CPT

## 2022-09-27 PROCEDURE — 85610 PROTHROMBIN TIME: CPT

## 2022-09-27 PROCEDURE — 85027 COMPLETE CBC AUTOMATED: CPT

## 2022-09-27 PROCEDURE — 86901 BLOOD TYPING SEROLOGIC RH(D): CPT

## 2022-09-27 PROCEDURE — G0463: CPT

## 2022-09-27 PROCEDURE — 36415 COLL VENOUS BLD VENIPUNCTURE: CPT

## 2022-09-27 PROCEDURE — 80048 BASIC METABOLIC PNL TOTAL CA: CPT

## 2022-09-27 PROCEDURE — 86850 RBC ANTIBODY SCREEN: CPT

## 2022-09-28 LAB — T PALLIDUM AB TITR SER: NEGATIVE — SIGNIFICANT CHANGE UP

## 2022-09-29 ENCOUNTER — APPOINTMENT (OUTPATIENT)
Dept: OBGYN | Facility: CLINIC | Age: 37
End: 2022-09-29

## 2022-09-29 VITALS
OXYGEN SATURATION: 98 % | HEART RATE: 79 BPM | TEMPERATURE: 98.1 F | WEIGHT: 150 LBS | BODY MASS INDEX: 28.32 KG/M2 | DIASTOLIC BLOOD PRESSURE: 76 MMHG | HEIGHT: 61 IN | RESPIRATION RATE: 15 BRPM | SYSTOLIC BLOOD PRESSURE: 131 MMHG

## 2022-09-29 PROCEDURE — 0502F SUBSEQUENT PRENATAL CARE: CPT

## 2022-10-03 ENCOUNTER — TRANSCRIPTION ENCOUNTER (OUTPATIENT)
Age: 37
End: 2022-10-03

## 2022-10-04 ENCOUNTER — INPATIENT (INPATIENT)
Facility: HOSPITAL | Age: 37
LOS: 1 days | Discharge: HOME | End: 2022-10-06
Attending: OBSTETRICS & GYNECOLOGY | Admitting: OBSTETRICS & GYNECOLOGY
Payer: MEDICAID

## 2022-10-04 VITALS — HEIGHT: 62 IN

## 2022-10-04 DIAGNOSIS — O24.419 GESTATIONAL DIABETES MELLITUS IN PREGNANCY, UNSPECIFIED CONTROL: ICD-10-CM

## 2022-10-04 LAB
ALBUMIN SERPL ELPH-MCNC: 2.8 G/DL — LOW (ref 3.5–5)
ALP SERPL-CCNC: 237 U/L — HIGH (ref 40–120)
ALT FLD-CCNC: 47 U/L DA — SIGNIFICANT CHANGE UP (ref 10–60)
ANION GAP SERPL CALC-SCNC: 12 MMOL/L — SIGNIFICANT CHANGE UP (ref 5–17)
APPEARANCE UR: CLEAR — SIGNIFICANT CHANGE UP
APTT BLD: 32.4 SEC — SIGNIFICANT CHANGE UP (ref 27.5–35.5)
AST SERPL-CCNC: 35 U/L — SIGNIFICANT CHANGE UP (ref 10–40)
BASOPHILS # BLD AUTO: 0.03 K/UL — SIGNIFICANT CHANGE UP (ref 0–0.2)
BASOPHILS NFR BLD AUTO: 0.4 % — SIGNIFICANT CHANGE UP (ref 0–2)
BILIRUB SERPL-MCNC: 0.3 MG/DL — SIGNIFICANT CHANGE UP (ref 0.2–1.2)
BILIRUB UR-MCNC: NEGATIVE — SIGNIFICANT CHANGE UP
BLD GP AB SCN SERPL QL: SIGNIFICANT CHANGE UP
BUN SERPL-MCNC: 4 MG/DL — LOW (ref 7–18)
CALCIUM SERPL-MCNC: 8.6 MG/DL — SIGNIFICANT CHANGE UP (ref 8.4–10.5)
CHLORIDE SERPL-SCNC: 109 MMOL/L — HIGH (ref 96–108)
CO2 SERPL-SCNC: 21 MMOL/L — LOW (ref 22–31)
COLOR SPEC: YELLOW — SIGNIFICANT CHANGE UP
CREAT ?TM UR-MCNC: 25 MG/DL — SIGNIFICANT CHANGE UP
CREAT SERPL-MCNC: 0.47 MG/DL — LOW (ref 0.5–1.3)
DIFF PNL FLD: NEGATIVE — SIGNIFICANT CHANGE UP
EGFR: 127 ML/MIN/1.73M2 — SIGNIFICANT CHANGE UP
EOSINOPHIL # BLD AUTO: 0.06 K/UL — SIGNIFICANT CHANGE UP (ref 0–0.5)
EOSINOPHIL NFR BLD AUTO: 0.8 % — SIGNIFICANT CHANGE UP (ref 0–6)
FIBRINOGEN PPP-MCNC: 790 MG/DL — HIGH (ref 340–550)
GLUCOSE BLDC GLUCOMTR-MCNC: 72 MG/DL — SIGNIFICANT CHANGE UP (ref 70–99)
GLUCOSE SERPL-MCNC: 69 MG/DL — LOW (ref 70–99)
GLUCOSE UR QL: NEGATIVE — SIGNIFICANT CHANGE UP
HCT VFR BLD CALC: 34.8 % — SIGNIFICANT CHANGE UP (ref 34.5–45)
HGB BLD-MCNC: 11.4 G/DL — LOW (ref 11.5–15.5)
IMM GRANULOCYTES NFR BLD AUTO: 0.5 % — SIGNIFICANT CHANGE UP (ref 0–0.9)
INR BLD: 0.94 RATIO — SIGNIFICANT CHANGE UP (ref 0.88–1.16)
KETONES UR-MCNC: ABNORMAL
LDH SERPL L TO P-CCNC: 226 U/L — HIGH (ref 120–225)
LEUKOCYTE ESTERASE UR-ACNC: NEGATIVE — SIGNIFICANT CHANGE UP
LYMPHOCYTES # BLD AUTO: 1.53 K/UL — SIGNIFICANT CHANGE UP (ref 1–3.3)
LYMPHOCYTES # BLD AUTO: 19.5 % — SIGNIFICANT CHANGE UP (ref 13–44)
MCHC RBC-ENTMCNC: 30.1 PG — SIGNIFICANT CHANGE UP (ref 27–34)
MCHC RBC-ENTMCNC: 32.8 GM/DL — SIGNIFICANT CHANGE UP (ref 32–36)
MCV RBC AUTO: 91.8 FL — SIGNIFICANT CHANGE UP (ref 80–100)
MONOCYTES # BLD AUTO: 0.9 K/UL — SIGNIFICANT CHANGE UP (ref 0–0.9)
MONOCYTES NFR BLD AUTO: 11.5 % — SIGNIFICANT CHANGE UP (ref 2–14)
NEUTROPHILS # BLD AUTO: 5.27 K/UL — SIGNIFICANT CHANGE UP (ref 1.8–7.4)
NEUTROPHILS NFR BLD AUTO: 67.3 % — SIGNIFICANT CHANGE UP (ref 43–77)
NITRITE UR-MCNC: NEGATIVE — SIGNIFICANT CHANGE UP
NRBC # BLD: 0 /100 WBCS — SIGNIFICANT CHANGE UP (ref 0–0)
PH UR: 7 — SIGNIFICANT CHANGE UP (ref 5–8)
PLATELET # BLD AUTO: 177 K/UL — SIGNIFICANT CHANGE UP (ref 150–400)
POTASSIUM SERPL-MCNC: 3.1 MMOL/L — LOW (ref 3.5–5.3)
POTASSIUM SERPL-SCNC: 3.1 MMOL/L — LOW (ref 3.5–5.3)
PROT ?TM UR-MCNC: <5 MG/DL — SIGNIFICANT CHANGE UP (ref 0–12)
PROT SERPL-MCNC: 6.4 G/DL — SIGNIFICANT CHANGE UP (ref 6–8.3)
PROT UR-MCNC: NEGATIVE — SIGNIFICANT CHANGE UP
PROTHROM AB SERPL-ACNC: 11.2 SEC — SIGNIFICANT CHANGE UP (ref 10.5–13.4)
RBC # BLD: 3.79 M/UL — LOW (ref 3.8–5.2)
RBC # FLD: 14.6 % — HIGH (ref 10.3–14.5)
SARS-COV-2 RNA SPEC QL NAA+PROBE: SIGNIFICANT CHANGE UP
SODIUM SERPL-SCNC: 142 MMOL/L — SIGNIFICANT CHANGE UP (ref 135–145)
SP GR SPEC: 1 — LOW (ref 1.01–1.02)
URATE SERPL-MCNC: 3.4 MG/DL — SIGNIFICANT CHANGE UP (ref 2.5–7)
UROBILINOGEN FLD QL: NEGATIVE — SIGNIFICANT CHANGE UP
WBC # BLD: 7.83 K/UL — SIGNIFICANT CHANGE UP (ref 3.8–10.5)
WBC # FLD AUTO: 7.83 K/UL — SIGNIFICANT CHANGE UP (ref 3.8–10.5)

## 2022-10-04 PROCEDURE — 59514 CESAREAN DELIVERY ONLY: CPT | Mod: U9

## 2022-10-04 RX ORDER — CEFAZOLIN SODIUM 1 G
2000 VIAL (EA) INJECTION ONCE
Refills: 0 | Status: COMPLETED | OUTPATIENT
Start: 2022-10-04 | End: 2022-10-04

## 2022-10-04 RX ORDER — ACETAMINOPHEN 500 MG
975 TABLET ORAL
Refills: 0 | Status: DISCONTINUED | OUTPATIENT
Start: 2022-10-04 | End: 2022-10-06

## 2022-10-04 RX ORDER — HEPARIN SODIUM 5000 [USP'U]/ML
5000 INJECTION INTRAVENOUS; SUBCUTANEOUS EVERY 12 HOURS
Refills: 0 | Status: DISCONTINUED | OUTPATIENT
Start: 2022-10-04 | End: 2022-10-06

## 2022-10-04 RX ORDER — FAMOTIDINE 10 MG/ML
20 INJECTION INTRAVENOUS ONCE
Refills: 0 | Status: COMPLETED | OUTPATIENT
Start: 2022-10-04 | End: 2022-10-04

## 2022-10-04 RX ORDER — ONDANSETRON 8 MG/1
4 TABLET, FILM COATED ORAL EVERY 6 HOURS
Refills: 0 | Status: DISCONTINUED | OUTPATIENT
Start: 2022-10-04 | End: 2022-10-06

## 2022-10-04 RX ORDER — FOLIC ACID 0.8 MG
1 TABLET ORAL DAILY
Refills: 0 | Status: DISCONTINUED | OUTPATIENT
Start: 2022-10-04 | End: 2022-10-06

## 2022-10-04 RX ORDER — DIPHENHYDRAMINE HCL 50 MG
25 CAPSULE ORAL EVERY 6 HOURS
Refills: 0 | Status: DISCONTINUED | OUTPATIENT
Start: 2022-10-04 | End: 2022-10-06

## 2022-10-04 RX ORDER — KETOROLAC TROMETHAMINE 30 MG/ML
30 SYRINGE (ML) INJECTION EVERY 6 HOURS
Refills: 0 | Status: COMPLETED | OUTPATIENT
Start: 2022-10-04 | End: 2022-10-06

## 2022-10-04 RX ORDER — OXYTOCIN 10 UNIT/ML
333.33 VIAL (ML) INJECTION
Qty: 20 | Refills: 0 | Status: DISCONTINUED | OUTPATIENT
Start: 2022-10-04 | End: 2022-10-04

## 2022-10-04 RX ORDER — TETANUS TOXOID, REDUCED DIPHTHERIA TOXOID AND ACELLULAR PERTUSSIS VACCINE, ADSORBED 5; 2.5; 8; 8; 2.5 [IU]/.5ML; [IU]/.5ML; UG/.5ML; UG/.5ML; UG/.5ML
0.5 SUSPENSION INTRAMUSCULAR ONCE
Refills: 0 | Status: DISCONTINUED | OUTPATIENT
Start: 2022-10-04 | End: 2022-10-06

## 2022-10-04 RX ORDER — SODIUM CHLORIDE 9 MG/ML
1000 INJECTION, SOLUTION INTRAVENOUS
Refills: 0 | Status: DISCONTINUED | OUTPATIENT
Start: 2022-10-04 | End: 2022-10-06

## 2022-10-04 RX ORDER — SODIUM CHLORIDE 9 MG/ML
1000 INJECTION, SOLUTION INTRAVENOUS
Refills: 0 | Status: DISCONTINUED | OUTPATIENT
Start: 2022-10-04 | End: 2022-10-04

## 2022-10-04 RX ORDER — FERROUS SULFATE 325(65) MG
325 TABLET ORAL DAILY
Refills: 0 | Status: DISCONTINUED | OUTPATIENT
Start: 2022-10-04 | End: 2022-10-06

## 2022-10-04 RX ORDER — NALOXONE HYDROCHLORIDE 4 MG/.1ML
0.1 SPRAY NASAL
Refills: 0 | Status: DISCONTINUED | OUTPATIENT
Start: 2022-10-04 | End: 2022-10-06

## 2022-10-04 RX ORDER — SODIUM CHLORIDE 9 MG/ML
1000 INJECTION, SOLUTION INTRAVENOUS ONCE
Refills: 0 | Status: COMPLETED | OUTPATIENT
Start: 2022-10-04 | End: 2022-10-04

## 2022-10-04 RX ORDER — IBUPROFEN 200 MG
600 TABLET ORAL EVERY 6 HOURS
Refills: 0 | Status: COMPLETED | OUTPATIENT
Start: 2022-10-04 | End: 2023-09-02

## 2022-10-04 RX ORDER — SIMETHICONE 80 MG/1
80 TABLET, CHEWABLE ORAL EVERY 4 HOURS
Refills: 0 | Status: DISCONTINUED | OUTPATIENT
Start: 2022-10-04 | End: 2022-10-06

## 2022-10-04 RX ORDER — OXYTOCIN 10 UNIT/ML
333.33 VIAL (ML) INJECTION
Qty: 20 | Refills: 0 | Status: DISCONTINUED | OUTPATIENT
Start: 2022-10-04 | End: 2022-10-06

## 2022-10-04 RX ORDER — DEXAMETHASONE 0.5 MG/5ML
4 ELIXIR ORAL EVERY 6 HOURS
Refills: 0 | Status: DISCONTINUED | OUTPATIENT
Start: 2022-10-04 | End: 2022-10-06

## 2022-10-04 RX ORDER — MAGNESIUM HYDROXIDE 400 MG/1
30 TABLET, CHEWABLE ORAL
Refills: 0 | Status: DISCONTINUED | OUTPATIENT
Start: 2022-10-04 | End: 2022-10-06

## 2022-10-04 RX ORDER — OXYCODONE HYDROCHLORIDE 5 MG/1
5 TABLET ORAL
Refills: 0 | Status: DISCONTINUED | OUTPATIENT
Start: 2022-10-04 | End: 2022-10-06

## 2022-10-04 RX ORDER — LANOLIN
1 OINTMENT (GRAM) TOPICAL EVERY 6 HOURS
Refills: 0 | Status: DISCONTINUED | OUTPATIENT
Start: 2022-10-04 | End: 2022-10-06

## 2022-10-04 RX ORDER — MORPHINE SULFATE 50 MG/1
0.2 CAPSULE, EXTENDED RELEASE ORAL ONCE
Refills: 0 | Status: DISCONTINUED | OUTPATIENT
Start: 2022-10-04 | End: 2022-10-06

## 2022-10-04 RX ORDER — CITRIC ACID/SODIUM CITRATE 300-500 MG
30 SOLUTION, ORAL ORAL ONCE
Refills: 0 | Status: COMPLETED | OUTPATIENT
Start: 2022-10-04 | End: 2022-10-04

## 2022-10-04 RX ADMIN — Medication 975 MILLIGRAM(S): at 21:53

## 2022-10-04 RX ADMIN — Medication 30 MILLILITER(S): at 08:56

## 2022-10-04 RX ADMIN — Medication 30 MILLIGRAM(S): at 17:17

## 2022-10-04 RX ADMIN — FAMOTIDINE 20 MILLIGRAM(S): 10 INJECTION INTRAVENOUS at 08:57

## 2022-10-04 RX ADMIN — Medication 100 MILLIGRAM(S): at 08:58

## 2022-10-04 RX ADMIN — SODIUM CHLORIDE 125 MILLILITER(S): 9 INJECTION, SOLUTION INTRAVENOUS at 08:59

## 2022-10-04 RX ADMIN — Medication 1000 MILLIUNIT(S)/MIN: at 10:33

## 2022-10-04 RX ADMIN — SIMETHICONE 80 MILLIGRAM(S): 80 TABLET, CHEWABLE ORAL at 17:17

## 2022-10-04 RX ADMIN — SODIUM CHLORIDE 2000 MILLILITER(S): 9 INJECTION, SOLUTION INTRAVENOUS at 08:00

## 2022-10-04 RX ADMIN — HEPARIN SODIUM 5000 UNIT(S): 5000 INJECTION INTRAVENOUS; SUBCUTANEOUS at 23:09

## 2022-10-04 RX ADMIN — Medication 975 MILLIGRAM(S): at 21:22

## 2022-10-04 RX ADMIN — SIMETHICONE 80 MILLIGRAM(S): 80 TABLET, CHEWABLE ORAL at 21:22

## 2022-10-04 RX ADMIN — Medication 30 MILLIGRAM(S): at 23:46

## 2022-10-04 NOTE — PATIENT PROFILE OB - FLU SEASON?
Discussed with patient at bedside,  stepped away. Pt is alert, awake, oriented x4. She has appropriate medical decision making capacity. She has good understanding of her comorbidities, hospital course and treatment. We spoke about the status of her metastatic lung cancer, treated with chemotherapy in 2016 followed by nivolumab with last dose in late 2018, told by her oncologist her disease is in remission based on last imaging and plan is to continue with surveillance and reimaging by oncology. She is aware her cardiac status is not great. She states "I am a fighter" with a supportive  and wants to continue all aggressive medical intervention at present time except for heroic interventions of CPR/mechancical intubation - DNR/DNI confirmed earlier this admission. She hopes to go home when medically stable and follow up with her doctors. She was tearful during our conversation but expressed appreciation of my visit. Reassurance and emotional support provided.
Yes...

## 2022-10-04 NOTE — PATIENT PROFILE OB - RUPTURE OF MEMBRANES_DATE TIME
Pre-Excision Curettage Text (Leave Blank If You Do Not Want): Prior to drawing the surgical margin the visible lesion was removed with electrodesiccation and curettage to clearly define the lesion size. 04-Oct-2022 10:32

## 2022-10-04 NOTE — PATIENT PROFILE OB - FALL HARM RISK - UNIVERSAL INTERVENTIONS
Bed in lowest position, wheels locked, appropriate side rails in place/Call bell, personal items and telephone in reach/Instruct patient to call for assistance before getting out of bed or chair/Non-slip footwear when patient is out of bed/[place] to call system/Physically safe environment - no spills, clutter or unnecessary equipment/Purposeful Proactive Rounding/Room/bathroom lighting operational, light cord in reach

## 2022-10-05 ENCOUNTER — TRANSCRIPTION ENCOUNTER (OUTPATIENT)
Age: 37
End: 2022-10-05

## 2022-10-05 LAB
BASOPHILS # BLD AUTO: 0.02 K/UL — SIGNIFICANT CHANGE UP (ref 0–0.2)
BASOPHILS NFR BLD AUTO: 0.2 % — SIGNIFICANT CHANGE UP (ref 0–2)
EOSINOPHIL # BLD AUTO: 0 K/UL — SIGNIFICANT CHANGE UP (ref 0–0.5)
EOSINOPHIL NFR BLD AUTO: 0 % — SIGNIFICANT CHANGE UP (ref 0–6)
HCT VFR BLD CALC: 26.2 % — LOW (ref 34.5–45)
HGB BLD-MCNC: 8.5 G/DL — LOW (ref 11.5–15.5)
IMM GRANULOCYTES NFR BLD AUTO: 0.5 % — SIGNIFICANT CHANGE UP (ref 0–0.9)
LYMPHOCYTES # BLD AUTO: 1.44 K/UL — SIGNIFICANT CHANGE UP (ref 1–3.3)
LYMPHOCYTES # BLD AUTO: 13 % — SIGNIFICANT CHANGE UP (ref 13–44)
MCHC RBC-ENTMCNC: 30.2 PG — SIGNIFICANT CHANGE UP (ref 27–34)
MCHC RBC-ENTMCNC: 32.4 GM/DL — SIGNIFICANT CHANGE UP (ref 32–36)
MCV RBC AUTO: 93.2 FL — SIGNIFICANT CHANGE UP (ref 80–100)
MONOCYTES # BLD AUTO: 0.79 K/UL — SIGNIFICANT CHANGE UP (ref 0–0.9)
MONOCYTES NFR BLD AUTO: 7.1 % — SIGNIFICANT CHANGE UP (ref 2–14)
NEUTROPHILS # BLD AUTO: 8.77 K/UL — HIGH (ref 1.8–7.4)
NEUTROPHILS NFR BLD AUTO: 79.2 % — HIGH (ref 43–77)
NRBC # BLD: 0 /100 WBCS — SIGNIFICANT CHANGE UP (ref 0–0)
PLATELET # BLD AUTO: 155 K/UL — SIGNIFICANT CHANGE UP (ref 150–400)
RBC # BLD: 2.81 M/UL — LOW (ref 3.8–5.2)
RBC # FLD: 14.7 % — HIGH (ref 10.3–14.5)
WBC # BLD: 11.08 K/UL — HIGH (ref 3.8–10.5)
WBC # FLD AUTO: 11.08 K/UL — HIGH (ref 3.8–10.5)

## 2022-10-05 RX ADMIN — SIMETHICONE 80 MILLIGRAM(S): 80 TABLET, CHEWABLE ORAL at 18:02

## 2022-10-05 RX ADMIN — SIMETHICONE 80 MILLIGRAM(S): 80 TABLET, CHEWABLE ORAL at 03:12

## 2022-10-05 RX ADMIN — Medication 975 MILLIGRAM(S): at 03:45

## 2022-10-05 RX ADMIN — SIMETHICONE 80 MILLIGRAM(S): 80 TABLET, CHEWABLE ORAL at 18:10

## 2022-10-05 RX ADMIN — Medication 30 MILLIGRAM(S): at 06:15

## 2022-10-05 RX ADMIN — SIMETHICONE 80 MILLIGRAM(S): 80 TABLET, CHEWABLE ORAL at 22:07

## 2022-10-05 RX ADMIN — Medication 30 MILLIGRAM(S): at 05:42

## 2022-10-05 RX ADMIN — Medication 975 MILLIGRAM(S): at 03:13

## 2022-10-05 RX ADMIN — Medication 30 MILLIGRAM(S): at 12:01

## 2022-10-05 RX ADMIN — Medication 325 MILLIGRAM(S): at 11:31

## 2022-10-05 RX ADMIN — Medication 1 MILLIGRAM(S): at 11:32

## 2022-10-05 RX ADMIN — Medication 30 MILLIGRAM(S): at 18:10

## 2022-10-05 RX ADMIN — Medication 30 MILLIGRAM(S): at 18:40

## 2022-10-05 RX ADMIN — Medication 975 MILLIGRAM(S): at 22:36

## 2022-10-05 RX ADMIN — Medication 975 MILLIGRAM(S): at 09:46

## 2022-10-05 RX ADMIN — HEPARIN SODIUM 5000 UNIT(S): 5000 INJECTION INTRAVENOUS; SUBCUTANEOUS at 22:07

## 2022-10-05 RX ADMIN — SIMETHICONE 80 MILLIGRAM(S): 80 TABLET, CHEWABLE ORAL at 05:42

## 2022-10-05 RX ADMIN — Medication 1 TABLET(S): at 11:31

## 2022-10-05 RX ADMIN — HEPARIN SODIUM 5000 UNIT(S): 5000 INJECTION INTRAVENOUS; SUBCUTANEOUS at 11:30

## 2022-10-05 RX ADMIN — Medication 30 MILLIGRAM(S): at 11:31

## 2022-10-05 RX ADMIN — Medication 975 MILLIGRAM(S): at 22:06

## 2022-10-05 RX ADMIN — SIMETHICONE 80 MILLIGRAM(S): 80 TABLET, CHEWABLE ORAL at 09:46

## 2022-10-05 RX ADMIN — Medication 30 MILLIGRAM(S): at 00:15

## 2022-10-05 RX ADMIN — Medication 975 MILLIGRAM(S): at 10:16

## 2022-10-05 NOTE — DISCHARGE NOTE OB - NS MD DC FALL RISK RISK
For information on Fall & Injury Prevention, visit: [email or web address] OR  [email or web address] OR  https://www.cdc.gov/steadi/patient.html

## 2022-10-05 NOTE — DISCHARGE NOTE OB - PRO FEEDING PLAN INFANT OB
Please see ultrasound report under imaging tab for details on ultrasound performed today.    Karina Ward MD  , OB/GYN  Maternal-Fetal Medicine  [email or web address]  [phone] (Academic office)  [phone] (Pager)    
initiation of breastfeeding/breast milk feeding

## 2022-10-05 NOTE — DISCHARGE NOTE OB - CARE PLAN
1 Principal Discharge DX:	Status post primary low transverse  section  Assessment and plan of treatment:	post-op care, no heavy lifting or pushing , no tub baths, douching or sex for 6weeks, ambulate daily  Continue prenatal vitamins, iron and vitamin c for anemia.   Follow-up in office in 1-2 weeks for wound check, then 5-6 weeks for post paratum check.   INcrease H2O intake for breastfeeding.   Principal Discharge DX:	Status post primary low transverse  section  Assessment and plan of treatment:	post-op care, no heavy lifting or pushing , no tub baths, douching or sex for 6weeks, ambulate daily  Continue prenatal vitamins, iron and vitamin c for anemia.   Follow-up in office in 1-2 weeks for wound check, then 5-6 weeks for post paratum check.   Increase H2O intake for breastfeeding.  Secondary Diagnosis:	Postoperative anemia due to acute blood loss  Assessment and plan of treatment:	iron/vitamin c/prenatal vitamins daily

## 2022-10-05 NOTE — DISCHARGE NOTE OB - MEDICATION SUMMARY - MEDICATIONS TO TAKE
I will START or STAY ON the medications listed below when I get home from the hospital:    acetaminophen 500 mg oral capsule  -- 2 cap(s) by mouth every 6 hours, As Needed   -- This product contains acetaminophen.  Do not use  with any other product containing acetaminophen to prevent possible liver damage.    -- Indication: For for pain    ibuprofen 600 mg oral tablet  -- 1 tab(s) by mouth every 6 hours, As needed, Mild pain or headache  -- Indication: For for pain    famotidine 20 mg oral tablet  -- 1 tab(s) by mouth 2 times a day   -- It is very important that you take or use this exactly as directed.  Do not skip doses or discontinue unless directed by your doctor.  Obtain medical advice before taking any non-prescription drugs as some may affect the action of this medication.    -- Indication: For prevents gastritis    ferrous sulfate (as elemental iron) 45 mg oral tablet, extended release  -- 1 tab(s) by mouth once a day   -- Check with your doctor before becoming pregnant.  May discolor urine or feces.  Some non-prescription drugs may aggravate your condition.  Read all labels carefully.  If a warning appears, check with your doctor before taking.  Swallow whole.  Do not crush.    -- Indication: For anemia post surgery    Prenatal Multivitamins with Folic Acid 1 mg oral tablet  -- 1 tab(s) by mouth once a day  -- Indication: For Supplemetn    Colace 2-in-1 50 mg-8.6 mg oral tablet  -- 1 tab(s) by mouth 2 times a day   -- Medication should be taken with plenty of water.    -- Indication: For for bm    simethicone 80 mg oral tablet, chewable  -- 1 tab(s) by mouth every 4 hours, As needed, Gas  -- Indication: For helps pass gas    ascorbic acid 500 mg oral tablet  -- 1 tab(s) by mouth once a day  -- Indication: For helps absorb iron

## 2022-10-05 NOTE — DISCHARGE NOTE OB - PATIENT PORTAL LINK FT
You can access the FollowMyHealth Patient Portal offered by [facility] by registering at the following website: [email or web address]. By joining [employer]’s FollowMyHealth portal, you will also be able to view your health information using other applications (apps) compatible with our system.

## 2022-10-05 NOTE — DISCHARGE NOTE OB - HOSPITAL COURSE
Pt admitted for elective  with GDMA1, gestation hypertension at 39 wks.   Pt had routine post-op care.

## 2022-10-05 NOTE — DISCHARGE NOTE OB - NSDCCPGOAL_GEN_ALL_CORE_FT
breastfeeding and pain management wound check with dr kruger 1-2weeks call the office set up appointment

## 2022-10-05 NOTE — DISCHARGE NOTE OB - SECONDARY DIAGNOSIS.
----- Message from Melissa Valdez MS sent at 4/11/2022  5:33 PM CDT -----  Please reach out and schedule a virtual f/u with me.  I have availability at 2pm on 4/14    Thanks        Postoperative anemia due to acute blood loss

## 2022-10-05 NOTE — DISCHARGE NOTE OB - CARE PROVIDER_API CALL
Abdulaziz Rose)  Obstetrics and Gynecology  [id number] [place] [address]  Phone: [phone]  Fax: [phone]  Established Patient  Follow Up Time: 2 weeks

## 2022-10-05 NOTE — DISCHARGE NOTE OB - PLAN OF CARE
post-op care, no heavy lifting or pushing , no tub baths, douching or sex for 6weeks, ambulate daily  Continue prenatal vitamins, iron and vitamin c for anemia.   Follow-up in office in 1-2 weeks for wound check, then 5-6 weeks for post paratum check.   INcrease H2O intake for breastfeeding. post-op care, no heavy lifting or pushing , no tub baths, douching or sex for 6weeks, ambulate daily  Continue prenatal vitamins, iron and vitamin c for anemia.   Follow-up in office in 1-2 weeks for wound check, then 5-6 weeks for post paratum check.   Increase H2O intake for breastfeeding. iron/vitamin c/prenatal vitamins daily

## 2022-10-06 VITALS
DIASTOLIC BLOOD PRESSURE: 82 MMHG | HEART RATE: 84 BPM | OXYGEN SATURATION: 96 % | RESPIRATION RATE: 16 BRPM | SYSTOLIC BLOOD PRESSURE: 133 MMHG | TEMPERATURE: 98 F

## 2022-10-06 PROCEDURE — 86850 RBC ANTIBODY SCREEN: CPT

## 2022-10-06 PROCEDURE — 85610 PROTHROMBIN TIME: CPT

## 2022-10-06 PROCEDURE — 85025 COMPLETE CBC W/AUTO DIFF WBC: CPT

## 2022-10-06 PROCEDURE — 59050 FETAL MONITOR W/REPORT: CPT

## 2022-10-06 PROCEDURE — 84550 ASSAY OF BLOOD/URIC ACID: CPT

## 2022-10-06 PROCEDURE — 86900 BLOOD TYPING SEROLOGIC ABO: CPT

## 2022-10-06 PROCEDURE — 81003 URINALYSIS AUTO W/O SCOPE: CPT

## 2022-10-06 PROCEDURE — 82570 ASSAY OF URINE CREATININE: CPT

## 2022-10-06 PROCEDURE — 83615 LACTATE (LD) (LDH) ENZYME: CPT

## 2022-10-06 PROCEDURE — 36415 COLL VENOUS BLD VENIPUNCTURE: CPT

## 2022-10-06 PROCEDURE — 82962 GLUCOSE BLOOD TEST: CPT

## 2022-10-06 PROCEDURE — 86901 BLOOD TYPING SEROLOGIC RH(D): CPT

## 2022-10-06 PROCEDURE — 86923 COMPATIBILITY TEST ELECTRIC: CPT

## 2022-10-06 PROCEDURE — 87635 SARS-COV-2 COVID-19 AMP PRB: CPT

## 2022-10-06 PROCEDURE — 80053 COMPREHEN METABOLIC PANEL: CPT

## 2022-10-06 PROCEDURE — 84156 ASSAY OF PROTEIN URINE: CPT

## 2022-10-06 PROCEDURE — 85384 FIBRINOGEN ACTIVITY: CPT

## 2022-10-06 PROCEDURE — 85730 THROMBOPLASTIN TIME PARTIAL: CPT

## 2022-10-06 RX ORDER — SENNOSIDES/DOCUSATE SODIUM 8.6MG-50MG
1 TABLET ORAL
Qty: 60 | Refills: 0
Start: 2022-10-06 | End: 2022-11-04

## 2022-10-06 RX ORDER — IBUPROFEN 200 MG
600 TABLET ORAL EVERY 6 HOURS
Refills: 0 | Status: DISCONTINUED | OUTPATIENT
Start: 2022-10-06 | End: 2022-10-06

## 2022-10-06 RX ORDER — SIMETHICONE 80 MG/1
1 TABLET, CHEWABLE ORAL
Qty: 30 | Refills: 0
Start: 2022-10-06 | End: 2022-10-10

## 2022-10-06 RX ORDER — ASCORBIC ACID 60 MG
1 TABLET,CHEWABLE ORAL
Qty: 30 | Refills: 0
Start: 2022-10-06 | End: 2022-11-04

## 2022-10-06 RX ORDER — IBUPROFEN 200 MG
1 TABLET ORAL
Qty: 20 | Refills: 0
Start: 2022-10-06 | End: 2022-10-10

## 2022-10-06 RX ORDER — ACETAMINOPHEN 500 MG
2 TABLET ORAL
Qty: 40 | Refills: 1
Start: 2022-10-06 | End: 2022-10-15

## 2022-10-06 RX ORDER — FERROUS SULFATE 325(65) MG
1 TABLET ORAL
Qty: 30 | Refills: 0
Start: 2022-10-06 | End: 2022-11-04

## 2022-10-06 RX ORDER — FAMOTIDINE 10 MG/ML
1 INJECTION INTRAVENOUS
Qty: 20 | Refills: 0
Start: 2022-10-06 | End: 2022-10-15

## 2022-10-06 RX ORDER — FAMOTIDINE 10 MG/ML
20 INJECTION INTRAVENOUS DAILY
Refills: 0 | Status: DISCONTINUED | OUTPATIENT
Start: 2022-10-06 | End: 2022-10-06

## 2022-10-06 RX ADMIN — SIMETHICONE 80 MILLIGRAM(S): 80 TABLET, CHEWABLE ORAL at 16:08

## 2022-10-06 RX ADMIN — HEPARIN SODIUM 5000 UNIT(S): 5000 INJECTION INTRAVENOUS; SUBCUTANEOUS at 11:51

## 2022-10-06 RX ADMIN — Medication 975 MILLIGRAM(S): at 09:15

## 2022-10-06 RX ADMIN — Medication 1 TABLET(S): at 11:51

## 2022-10-06 RX ADMIN — Medication 30 MILLIGRAM(S): at 01:00

## 2022-10-06 RX ADMIN — FAMOTIDINE 20 MILLIGRAM(S): 10 INJECTION INTRAVENOUS at 11:51

## 2022-10-06 RX ADMIN — Medication 30 MILLIGRAM(S): at 00:24

## 2022-10-06 RX ADMIN — SIMETHICONE 80 MILLIGRAM(S): 80 TABLET, CHEWABLE ORAL at 11:51

## 2022-10-06 RX ADMIN — Medication 975 MILLIGRAM(S): at 16:30

## 2022-10-06 RX ADMIN — SIMETHICONE 80 MILLIGRAM(S): 80 TABLET, CHEWABLE ORAL at 06:07

## 2022-10-06 RX ADMIN — Medication 325 MILLIGRAM(S): at 11:51

## 2022-10-06 RX ADMIN — Medication 975 MILLIGRAM(S): at 16:08

## 2022-10-06 RX ADMIN — Medication 975 MILLIGRAM(S): at 08:44

## 2022-10-06 RX ADMIN — Medication 1 MILLIGRAM(S): at 11:51

## 2022-10-06 RX ADMIN — Medication 600 MILLIGRAM(S): at 12:15

## 2022-10-06 RX ADMIN — Medication 600 MILLIGRAM(S): at 11:52

## 2022-10-06 NOTE — LACTATION INITIAL EVALUATION - LACTATION INTERVENTIONS
pt. found in bed, trembling and cold and c/o dizzyness post anesthesia; states plans to breast and formula feed however, not feeling well to handle/feed baby; Mom requests formula. Explored reason and discussed risks of supplementing while breastfeeding without medical reason. Mom still chooses to supplement with formula. Explained risks of using artificial nipples and discussed options for using alternative feeding methods instead of nipple.  Safe formula preparation and feeding discussed.  FOB fed baby as requested by pt./initiate/review safe skin-to-skin/reviewed benefits and recommendations for rooming in/reviewed feeding on demand/by cue at least 8 times a day
Reinforced benefits of  exclusive breastfeeding for first 6 months and provided with breastfeeding community resource list for breastfeeding support/assistance available post-discharge and of importance of early f/u with pediatrician within 2-3 days./initiate/review safe skin-to-skin/initiate/review hand expression/initiate/review pumping guidelines and safe milk handling/initiate/review techniques for position and latch/post discharge community resources provided/review techniques to increase milk supply/review techniques to manage sore nipples/engorgement/reviewed components of an effective feeding and at least 8 effective feedings per day required/reviewed importance of monitoring infant diapers, the breastfeeding log, and minimum output each day/reviewed benefits and recommendations for rooming in/reviewed feeding on demand/by cue at least 8 times a day/reviewed indications of inadequate milk transfer that would require supplementation

## 2022-10-06 NOTE — LACTATION INITIAL EVALUATION - FEEDING IN DELIVERY COMMENT, INFANT PROFILE
mother "feeling dizzy" and unable to hold/respond to baby at this time
mother "feeling dizzy" and unable to hold/respond to baby at this time

## 2022-10-06 NOTE — PROGRESS NOTE ADULT - SUBJECTIVE AND OBJECTIVE BOX
Patient seen at [facility] resting comfortably offers no new complaints. Awaiting Ambulation, voiding without difficulty, no flatus; tolerating regular diet. both breast and bottle feeding. Denies HA, CP, SOB, N/V/D,  no bm; dizziness, palpitations, worsening abdominal pain, worsening vaginal bleeding, or any other concerns.     Vital Signs Last 24 Hrs  T(C): 36.7 (05 Oct 2022 05:53), Max: 37.4 (04 Oct 2022 18:10)  T(F): 98.1 (05 Oct 2022 05:53), Max: 99.3 (04 Oct 2022 18:10)  HR: 89 (05 Oct 2022 05:53) (75 - 90)  BP: 135/76 (05 Oct 2022 05:53) (114/65 - 145/89)  BP(mean): --  RR: 17 (05 Oct 2022 05:53) (13 - 18)  SpO2: 96% (05 Oct 2022 05:53) (94% - 100%)    Parameters below as of 05 Oct 2022 05:53  Patient On (Oxygen Delivery Method): room air        Gen: A&O x 3, NAD  Chest: CTA B/L  Cardiac: S1,S2  RRR  Breast: Soft, nontender, nonengorged  Abdomen: +BS; soft; Nontender, nondistended; dressing removed incision C/D/I  Gyn: minimal lochia   Extremities: Nontender, no worsening edema;                           8.5    11.08 )-----------( 155      ( 05 Oct 2022 06:58 )             26.2       A/P: POD #1 s/p c/s   Chronic anemia    --Pain management as needed  -Advance as per protocol  -OOB and ambulate  -iron and vitamin  -DC pham f/u void  -regular diet  -Encourage breastfeeding   -d/w saskia
post op note day 0  pt prim cs  pt doing well  offers no acute complaints  pain well controlled    Vital Signs Last 24 Hrs  T(C): 36.9 (04 Oct 2022 14:42), Max: 37.1 (04 Oct 2022 11:20)  T(F): 98.4 (04 Oct 2022 14:42), Max: 98.8 (04 Oct 2022 11:20)  HR: 90 (04 Oct 2022 14:42) (75 - 90)  BP: 145/89 (04 Oct 2022 14:42) (114/65 - 145/89)  BP(mean): --  RR: 18 (04 Oct 2022 14:42) (13 - 18)  SpO2: 96% (04 Oct 2022 14:42) (96% - 100%)    Parameters below as of 04 Oct 2022 14:42  Patient On (Oxygen Delivery Method): room air    abd inc site + derma bond   uterus firm  abd soft ND no guarding  lochia minimal  pham clear yellow  extrem +venodyne boots b/l    cont post op care     
PA NOTE:  pod#2   primary elective c/s 39.1; GHTN   covid neg   bp: 130-140/70-80  asympt  doing well  case management for home care    in the room                        8.5    11.08 )-----------( 155      ( 05 Oct 2022 06:58 )             26.2   on iron supplement    Patient seen at bedisde resting comfortably offers no new complaints. + Ambulation, + void without difficulty, + flatus; tolerating regular diet. both breastfeeding and bottle feeding. Denies HA, CP, SOB, N/V/D,  no bm; dizziness, palpitations, worsening abdominal pain, worsening vaginal bleeding, or any other concerns.     Vital Signs Last 24 Hrs  T(C): 36.9 (06 Oct 2022 07:05), Max: 36.9 (05 Oct 2022 10:33)  T(F): 98.5 (06 Oct 2022 07:05), Max: 98.5 (06 Oct 2022 07:05)  HR: 84 (06 Oct 2022 07:05) (74 - 98)  BP: 133/82 (06 Oct 2022 07:05) (131/75 - 142/84)  BP(mean): --  RR: 16 (06 Oct 2022 07:05) (16 - 18)  SpO2: 96% (06 Oct 2022 07:05) (95% - 97%)    Parameters below as of 06 Oct 2022 07:05  Patient On (Oxygen Delivery Method): room air      CAPILLARY BLOOD GLUCOSE          Gen: A&O x 3, NAD  Chest: CTABL  Cardiac: S1+S2+ RRR  Breast: Soft, nontender, nonengorged  Abdomen: +BS; soft; Nontender, nondistended, dressing removed Incision C/D/I dermabond in place no erythema no edema  Gyn: Minimal lochia  Extremities: Nontender, DTRS 2+, no worsening edema

## 2022-10-11 ENCOUNTER — INPATIENT (INPATIENT)
Facility: HOSPITAL | Age: 37
LOS: 1 days | Discharge: ANOTHER HOSPITAL | DRG: 776 | End: 2022-10-13
Attending: INTERNAL MEDICINE | Admitting: INTERNAL MEDICINE
Payer: MEDICAID

## 2022-10-11 VITALS
TEMPERATURE: 99 F | RESPIRATION RATE: 26 BRPM | DIASTOLIC BLOOD PRESSURE: 86 MMHG | WEIGHT: 147.71 LBS | OXYGEN SATURATION: 92 % | HEART RATE: 128 BPM | SYSTOLIC BLOOD PRESSURE: 143 MMHG | HEIGHT: 60.63 IN

## 2022-10-11 LAB
APTT BLD: 35.3 SEC — SIGNIFICANT CHANGE UP (ref 27.5–35.5)
BASE EXCESS BLDV CALC-SCNC: 2.4 MMOL/L — SIGNIFICANT CHANGE UP
HCO3 BLDV-SCNC: 26 MMOL/L — SIGNIFICANT CHANGE UP (ref 22–29)
HCT VFR BLD CALC: 34.3 % — LOW (ref 34.5–45)
HGB BLD-MCNC: 10.9 G/DL — LOW (ref 11.5–15.5)
INR BLD: 0.95 RATIO — SIGNIFICANT CHANGE UP (ref 0.88–1.16)
MCHC RBC-ENTMCNC: 29.9 PG — SIGNIFICANT CHANGE UP (ref 27–34)
MCHC RBC-ENTMCNC: 31.8 GM/DL — LOW (ref 32–36)
MCV RBC AUTO: 94.2 FL — SIGNIFICANT CHANGE UP (ref 80–100)
NRBC # BLD: 0 /100 WBCS — SIGNIFICANT CHANGE UP (ref 0–0)
PCO2 BLDV: 38 MMHG — LOW (ref 39–42)
PH BLDV: 7.45 — HIGH (ref 7.32–7.43)
PLATELET # BLD AUTO: 336 K/UL — SIGNIFICANT CHANGE UP (ref 150–400)
PO2 BLDV: 30 MMHG — SIGNIFICANT CHANGE UP
PROTHROM AB SERPL-ACNC: 11.3 SEC — SIGNIFICANT CHANGE UP (ref 10.5–13.4)
RBC # BLD: 3.64 M/UL — LOW (ref 3.8–5.2)
RBC # FLD: 14.8 % — HIGH (ref 10.3–14.5)
SAO2 % BLDV: 44.3 % — SIGNIFICANT CHANGE UP
WBC # BLD: 8.24 K/UL — SIGNIFICANT CHANGE UP (ref 3.8–10.5)
WBC # FLD AUTO: 8.24 K/UL — SIGNIFICANT CHANGE UP (ref 3.8–10.5)

## 2022-10-11 PROCEDURE — 99291 CRITICAL CARE FIRST HOUR: CPT

## 2022-10-11 RX ORDER — FUROSEMIDE 40 MG
20 TABLET ORAL ONCE
Refills: 0 | Status: COMPLETED | OUTPATIENT
Start: 2022-10-11 | End: 2022-10-11

## 2022-10-11 RX ADMIN — Medication 20 MILLIGRAM(S): at 23:49

## 2022-10-11 NOTE — ED PROVIDER NOTE - CLINICAL SUMMARY MEDICAL DECISION MAKING FREE TEXT BOX
36-year-old female with hypoxia, lower extremity edema, bibasilar crackles 1 week status post .  Etiology possibly postpartum cardiomyopathy versus PE versus preeclampsia versus other.  OB/GYN consulted.  EKG sinus tachycardia with rate 131 bpm,   Patient hypoxic to 81% on room air so placed on 50% Ventimask with improvement of oxygen to 93%

## 2022-10-11 NOTE — ED ADULT TRIAGE NOTE - BRAND OF COVID-19 VACCINATION
Pfizer dose 1 and 2
I have personally performed a face to face diagnostic evaluation on this patient. I have reviewed the ACP note and agree with the history, exam and plan of care, except as noted.

## 2022-10-11 NOTE — ED PROVIDER NOTE - PHYSICAL EXAMINATION
GENERAL: well appearing, Nontoxic-appearing   HEAD: atraumatic   EYES: EOMI   ENT: moist oral mucosa   CARDIAC: Tachycardic to 120s, moderate bilateral lower extremity edema  RESPIRATORY: Moderate increased work of breathing, respiratory rate 26-28, mild use of accessory muscles, speaking in full sentences, bibasilar crackles without wheezing  ABDO: Soft nontender nondistended  MUSCULOSKELETAL: no deformity   NEUROLOGICAL: alert, spontaneous movement of extremities   SKIN: no visible rash  PSYCHIATRIC: cooperative

## 2022-10-11 NOTE — ED PROVIDER NOTE - PROGRESS NOTE DETAILS
OB/GYN PA at bedside to assist with OB/GYN related evaluation  Patient with worsening increased work of breathing, now developing cough with persistent crackles so placed on BiPAP, which is first time.  ICU consulted and accepted patient  Chest x-ray with bilateral pulmonary edema  CMP lab work pending  Admitted to ICU

## 2022-10-11 NOTE — ED PROVIDER NOTE - OBJECTIVE STATEMENT
36-year-old female past medical history of asthma (states she has not been admitted to the hospital in many years), gestational hypertension, status post elective induction and  1 week ago, presents from home with gradual onset shortness of breath x2 days associated with lower extremity edema bilaterally.  Patient checking oxygen at home and notes it was in the 80s.  Denies fever, chest pain, cough, heavy vaginal bleeding, other acute complaints

## 2022-10-11 NOTE — ED PROVIDER NOTE - NS ED ROS FT
CONSTITUTIONAL: no fever  EYES: no eye pain   ENMT: no throat pain  CARDIOVASCULAR: no chest pain   RESPIRATORY: +shortness of breath   GASTROINTESTINAL: no abdominal pain   GENITOURINARY: no dysuria   SKIN: no rashes  NEUROLOGICAL: no headache

## 2022-10-12 ENCOUNTER — TRANSCRIPTION ENCOUNTER (OUTPATIENT)
Age: 37
End: 2022-10-12

## 2022-10-12 ENCOUNTER — APPOINTMENT (OUTPATIENT)
Dept: OBGYN | Facility: CLINIC | Age: 37
End: 2022-10-12

## 2022-10-12 DIAGNOSIS — J81.0 ACUTE PULMONARY EDEMA: ICD-10-CM

## 2022-10-12 DIAGNOSIS — O14.10 SEVERE PRE-ECLAMPSIA, UNSPECIFIED TRIMESTER: ICD-10-CM

## 2022-10-12 DIAGNOSIS — R06.02 SHORTNESS OF BREATH: ICD-10-CM

## 2022-10-12 DIAGNOSIS — J96.01 ACUTE RESPIRATORY FAILURE WITH HYPOXIA: ICD-10-CM

## 2022-10-12 DIAGNOSIS — Z98.891 HISTORY OF UTERINE SCAR FROM PREVIOUS SURGERY: Chronic | ICD-10-CM

## 2022-10-12 LAB
ALBUMIN SERPL ELPH-MCNC: 2.9 G/DL — LOW (ref 3.5–5)
ALP SERPL-CCNC: 129 U/L — HIGH (ref 40–120)
ALT FLD-CCNC: 55 U/L DA — SIGNIFICANT CHANGE UP (ref 10–60)
ANION GAP SERPL CALC-SCNC: 8 MMOL/L — SIGNIFICANT CHANGE UP (ref 5–17)
APPEARANCE UR: CLEAR — SIGNIFICANT CHANGE UP
AST SERPL-CCNC: 27 U/L — SIGNIFICANT CHANGE UP (ref 10–40)
BACTERIA # UR AUTO: ABNORMAL /HPF
BASOPHILS # BLD AUTO: 0.04 K/UL — SIGNIFICANT CHANGE UP (ref 0–0.2)
BASOPHILS NFR BLD AUTO: 0.3 % — SIGNIFICANT CHANGE UP (ref 0–2)
BILIRUB SERPL-MCNC: 0.3 MG/DL — SIGNIFICANT CHANGE UP (ref 0.2–1.2)
BILIRUB UR-MCNC: NEGATIVE — SIGNIFICANT CHANGE UP
BUN SERPL-MCNC: 11 MG/DL — SIGNIFICANT CHANGE UP (ref 7–18)
BUN SERPL-MCNC: 9 MG/DL — SIGNIFICANT CHANGE UP (ref 7–18)
BUN SERPL-MCNC: 9 MG/DL — SIGNIFICANT CHANGE UP (ref 7–18)
CALCIUM SERPL-MCNC: 7.8 MG/DL — LOW (ref 8.4–10.5)
CALCIUM SERPL-MCNC: 8 MG/DL — LOW (ref 8.4–10.5)
CALCIUM SERPL-MCNC: 8.1 MG/DL — LOW (ref 8.4–10.5)
CHLORIDE SERPL-SCNC: 108 MMOL/L — SIGNIFICANT CHANGE UP (ref 96–108)
CHLORIDE SERPL-SCNC: 110 MMOL/L — HIGH (ref 96–108)
CHLORIDE SERPL-SCNC: 111 MMOL/L — HIGH (ref 96–108)
CO2 SERPL-SCNC: 24 MMOL/L — SIGNIFICANT CHANGE UP (ref 22–31)
CO2 SERPL-SCNC: 26 MMOL/L — SIGNIFICANT CHANGE UP (ref 22–31)
CO2 SERPL-SCNC: 26 MMOL/L — SIGNIFICANT CHANGE UP (ref 22–31)
COLOR SPEC: YELLOW — SIGNIFICANT CHANGE UP
CREAT SERPL-MCNC: 0.49 MG/DL — LOW (ref 0.5–1.3)
CREAT SERPL-MCNC: 0.57 MG/DL — SIGNIFICANT CHANGE UP (ref 0.5–1.3)
CREAT SERPL-MCNC: 0.6 MG/DL — SIGNIFICANT CHANGE UP (ref 0.5–1.3)
DIFF PNL FLD: NEGATIVE — SIGNIFICANT CHANGE UP
EGFR: 120 ML/MIN/1.73M2 — SIGNIFICANT CHANGE UP
EGFR: 121 ML/MIN/1.73M2 — SIGNIFICANT CHANGE UP
EGFR: 126 ML/MIN/1.73M2 — SIGNIFICANT CHANGE UP
EOSINOPHIL # BLD AUTO: 0.02 K/UL — SIGNIFICANT CHANGE UP (ref 0–0.5)
EOSINOPHIL NFR BLD AUTO: 0.2 % — SIGNIFICANT CHANGE UP (ref 0–6)
EPI CELLS # UR: SIGNIFICANT CHANGE UP /HPF
FERRITIN SERPL-MCNC: 50 NG/ML — SIGNIFICANT CHANGE UP (ref 15–150)
FIBRINOGEN PPP-MCNC: 543 MG/DL — SIGNIFICANT CHANGE UP (ref 340–550)
GLUCOSE SERPL-MCNC: 113 MG/DL — HIGH (ref 70–99)
GLUCOSE SERPL-MCNC: 82 MG/DL — SIGNIFICANT CHANGE UP (ref 70–99)
GLUCOSE SERPL-MCNC: 91 MG/DL — SIGNIFICANT CHANGE UP (ref 70–99)
GLUCOSE UR QL: NEGATIVE — SIGNIFICANT CHANGE UP
HCT VFR BLD CALC: 32.6 % — LOW (ref 34.5–45)
HGB BLD-MCNC: 10.4 G/DL — LOW (ref 11.5–15.5)
IMM GRANULOCYTES NFR BLD AUTO: 0.4 % — SIGNIFICANT CHANGE UP (ref 0–0.9)
IRON SATN MFR SERPL: 30 UG/DL — LOW (ref 40–150)
IRON SATN MFR SERPL: 7 % — LOW (ref 15–50)
KETONES UR-MCNC: NEGATIVE — SIGNIFICANT CHANGE UP
LACTATE SERPL-SCNC: 2 MMOL/L — SIGNIFICANT CHANGE UP (ref 0.7–2)
LDH SERPL L TO P-CCNC: 412 U/L — HIGH (ref 120–225)
LEUKOCYTE ESTERASE UR-ACNC: NEGATIVE — SIGNIFICANT CHANGE UP
LYMPHOCYTES # BLD AUTO: 0.97 K/UL — LOW (ref 1–3.3)
LYMPHOCYTES # BLD AUTO: 8.3 % — LOW (ref 13–44)
MAGNESIUM SERPL-MCNC: 2 MG/DL — SIGNIFICANT CHANGE UP (ref 1.6–2.6)
MCHC RBC-ENTMCNC: 29.5 PG — SIGNIFICANT CHANGE UP (ref 27–34)
MCHC RBC-ENTMCNC: 31.9 GM/DL — LOW (ref 32–36)
MCV RBC AUTO: 92.4 FL — SIGNIFICANT CHANGE UP (ref 80–100)
MONOCYTES # BLD AUTO: 0.6 K/UL — SIGNIFICANT CHANGE UP (ref 0–0.9)
MONOCYTES NFR BLD AUTO: 5.1 % — SIGNIFICANT CHANGE UP (ref 2–14)
MRSA PCR RESULT.: SIGNIFICANT CHANGE UP
NEUTROPHILS # BLD AUTO: 9.98 K/UL — HIGH (ref 1.8–7.4)
NEUTROPHILS NFR BLD AUTO: 85.7 % — HIGH (ref 43–77)
NITRITE UR-MCNC: NEGATIVE — SIGNIFICANT CHANGE UP
NRBC # BLD: 0 /100 WBCS — SIGNIFICANT CHANGE UP (ref 0–0)
NT-PROBNP SERPL-SCNC: 7113 PG/ML — HIGH (ref 0–125)
PH UR: 6.5 — SIGNIFICANT CHANGE UP (ref 5–8)
PHOSPHATE SERPL-MCNC: 2.6 MG/DL — SIGNIFICANT CHANGE UP (ref 2.5–4.5)
PLATELET # BLD AUTO: 325 K/UL — SIGNIFICANT CHANGE UP (ref 150–400)
POTASSIUM SERPL-MCNC: 2.6 MMOL/L — CRITICAL LOW (ref 3.5–5.3)
POTASSIUM SERPL-MCNC: 3.7 MMOL/L — SIGNIFICANT CHANGE UP (ref 3.5–5.3)
POTASSIUM SERPL-MCNC: 4.4 MMOL/L — SIGNIFICANT CHANGE UP (ref 3.5–5.3)
POTASSIUM SERPL-SCNC: 2.6 MMOL/L — CRITICAL LOW (ref 3.5–5.3)
POTASSIUM SERPL-SCNC: 3.7 MMOL/L — SIGNIFICANT CHANGE UP (ref 3.5–5.3)
POTASSIUM SERPL-SCNC: 4.4 MMOL/L — SIGNIFICANT CHANGE UP (ref 3.5–5.3)
PROCALCITONIN SERPL-MCNC: 0.03 NG/ML — SIGNIFICANT CHANGE UP (ref 0.02–0.1)
PROT ?TM UR-MCNC: <5 MG/DL — SIGNIFICANT CHANGE UP (ref 0–12)
PROT SERPL-MCNC: 6.4 G/DL — SIGNIFICANT CHANGE UP (ref 6–8.3)
PROT UR-MCNC: NEGATIVE — SIGNIFICANT CHANGE UP
RBC # BLD: 3.53 M/UL — LOW (ref 3.8–5.2)
RBC # FLD: 14.8 % — HIGH (ref 10.3–14.5)
RBC CASTS # UR COMP ASSIST: SIGNIFICANT CHANGE UP /HPF (ref 0–2)
S AUREUS DNA NOSE QL NAA+PROBE: SIGNIFICANT CHANGE UP
SARS-COV-2 RNA SPEC QL NAA+PROBE: SIGNIFICANT CHANGE UP
SODIUM SERPL-SCNC: 142 MMOL/L — SIGNIFICANT CHANGE UP (ref 135–145)
SODIUM SERPL-SCNC: 143 MMOL/L — SIGNIFICANT CHANGE UP (ref 135–145)
SODIUM SERPL-SCNC: 144 MMOL/L — SIGNIFICANT CHANGE UP (ref 135–145)
SP GR SPEC: 1 — LOW (ref 1.01–1.02)
TIBC SERPL-MCNC: 433 UG/DL — SIGNIFICANT CHANGE UP (ref 250–450)
TROPONIN I, HIGH SENSITIVITY RESULT: 449.3 NG/L — HIGH
TROPONIN I, HIGH SENSITIVITY RESULT: 482.7 NG/L — HIGH
TROPONIN I, HIGH SENSITIVITY RESULT: 529.4 NG/L — HIGH
TROPONIN I, HIGH SENSITIVITY RESULT: 641.7 NG/L — HIGH
TROPONIN I, HIGH SENSITIVITY RESULT: 661.1 NG/L — HIGH
UIBC SERPL-MCNC: 403 UG/DL — HIGH (ref 110–370)
URATE SERPL-MCNC: 4.6 MG/DL — SIGNIFICANT CHANGE UP (ref 2.5–7)
UROBILINOGEN FLD QL: NEGATIVE — SIGNIFICANT CHANGE UP
WBC # BLD: 11.66 K/UL — HIGH (ref 3.8–10.5)
WBC # FLD AUTO: 11.66 K/UL — HIGH (ref 3.8–10.5)
WBC UR QL: SIGNIFICANT CHANGE UP /HPF (ref 0–5)

## 2022-10-12 PROCEDURE — 71045 X-RAY EXAM CHEST 1 VIEW: CPT | Mod: 26

## 2022-10-12 PROCEDURE — 71045 X-RAY EXAM CHEST 1 VIEW: CPT | Mod: 26,77

## 2022-10-12 PROCEDURE — 99291 CRITICAL CARE FIRST HOUR: CPT

## 2022-10-12 RX ORDER — LEVETIRACETAM 250 MG/1
500 TABLET, FILM COATED ORAL EVERY 12 HOURS
Refills: 0 | Status: COMPLETED | OUTPATIENT
Start: 2022-10-12 | End: 2022-10-12

## 2022-10-12 RX ORDER — POTASSIUM CHLORIDE 20 MEQ
20 PACKET (EA) ORAL
Refills: 0 | Status: COMPLETED | OUTPATIENT
Start: 2022-10-12 | End: 2022-10-12

## 2022-10-12 RX ORDER — FUROSEMIDE 40 MG
40 TABLET ORAL
Refills: 0 | Status: DISCONTINUED | OUTPATIENT
Start: 2022-10-12 | End: 2022-10-12

## 2022-10-12 RX ORDER — ISOSORBIDE DINITRATE 5 MG/1
10 TABLET ORAL THREE TIMES A DAY
Refills: 0 | Status: DISCONTINUED | OUTPATIENT
Start: 2022-10-12 | End: 2022-10-12

## 2022-10-12 RX ORDER — HYDRALAZINE HCL 50 MG
10 TABLET ORAL EVERY 8 HOURS
Refills: 0 | Status: DISCONTINUED | OUTPATIENT
Start: 2022-10-12 | End: 2022-10-12

## 2022-10-12 RX ORDER — INFLUENZA VIRUS VACCINE 15; 15; 15; 15 UG/.5ML; UG/.5ML; UG/.5ML; UG/.5ML
0.5 SUSPENSION INTRAMUSCULAR ONCE
Refills: 0 | Status: DISCONTINUED | OUTPATIENT
Start: 2022-10-12 | End: 2022-10-12

## 2022-10-12 RX ORDER — POTASSIUM CHLORIDE 20 MEQ
10 PACKET (EA) ORAL
Refills: 0 | Status: COMPLETED | OUTPATIENT
Start: 2022-10-12 | End: 2022-10-12

## 2022-10-12 RX ORDER — NITROGLYCERIN 6.5 MG
5 CAPSULE, EXTENDED RELEASE ORAL
Qty: 50 | Refills: 0 | Status: DISCONTINUED | OUTPATIENT
Start: 2022-10-12 | End: 2022-10-12

## 2022-10-12 RX ORDER — FUROSEMIDE 40 MG
20 TABLET ORAL DAILY
Refills: 0 | Status: DISCONTINUED | OUTPATIENT
Start: 2022-10-12 | End: 2022-10-12

## 2022-10-12 RX ORDER — ETOMIDATE 2 MG/ML
20 INJECTION INTRAVENOUS ONCE
Refills: 0 | Status: DISCONTINUED | OUTPATIENT
Start: 2022-10-12 | End: 2022-10-12

## 2022-10-12 RX ORDER — ISOSORBIDE DINITRATE 5 MG/1
1 TABLET ORAL
Qty: 0 | Refills: 0 | DISCHARGE
Start: 2022-10-12

## 2022-10-12 RX ORDER — ROCURONIUM BROMIDE 10 MG/ML
50 VIAL (ML) INTRAVENOUS ONCE
Refills: 0 | Status: DISCONTINUED | OUTPATIENT
Start: 2022-10-12 | End: 2022-10-12

## 2022-10-12 RX ORDER — ACETAMINOPHEN 500 MG
650 TABLET ORAL ONCE
Refills: 0 | Status: COMPLETED | OUTPATIENT
Start: 2022-10-12 | End: 2022-10-12

## 2022-10-12 RX ORDER — HYDRALAZINE HCL 50 MG
1 TABLET ORAL
Qty: 0 | Refills: 0 | DISCHARGE
Start: 2022-10-12

## 2022-10-12 RX ORDER — PANTOPRAZOLE SODIUM 20 MG/1
40 TABLET, DELAYED RELEASE ORAL DAILY
Refills: 0 | Status: DISCONTINUED | OUTPATIENT
Start: 2022-10-12 | End: 2022-10-12

## 2022-10-12 RX ORDER — FUROSEMIDE 40 MG
20 TABLET ORAL ONCE
Refills: 0 | Status: COMPLETED | OUTPATIENT
Start: 2022-10-12 | End: 2022-10-12

## 2022-10-12 RX ORDER — ENOXAPARIN SODIUM 100 MG/ML
40 INJECTION SUBCUTANEOUS
Qty: 0 | Refills: 0 | DISCHARGE
Start: 2022-10-12

## 2022-10-12 RX ORDER — ENOXAPARIN SODIUM 100 MG/ML
40 INJECTION SUBCUTANEOUS EVERY 24 HOURS
Refills: 0 | Status: DISCONTINUED | OUTPATIENT
Start: 2022-10-12 | End: 2022-10-12

## 2022-10-12 RX ORDER — ONDANSETRON 8 MG/1
0 TABLET, FILM COATED ORAL
Qty: 0 | Refills: 0 | DISCHARGE
Start: 2022-10-12

## 2022-10-12 RX ORDER — FUROSEMIDE 40 MG
20 TABLET ORAL
Refills: 0 | Status: DISCONTINUED | OUTPATIENT
Start: 2022-10-12 | End: 2022-10-12

## 2022-10-12 RX ORDER — ONDANSETRON 8 MG/1
4 TABLET, FILM COATED ORAL EVERY 8 HOURS
Refills: 0 | Status: DISCONTINUED | OUTPATIENT
Start: 2022-10-12 | End: 2022-10-12

## 2022-10-12 RX ORDER — PANTOPRAZOLE SODIUM 20 MG/1
40 TABLET, DELAYED RELEASE ORAL
Qty: 0 | Refills: 0 | DISCHARGE
Start: 2022-10-12

## 2022-10-12 RX ORDER — POTASSIUM CHLORIDE 20 MEQ
40 PACKET (EA) ORAL ONCE
Refills: 0 | Status: COMPLETED | OUTPATIENT
Start: 2022-10-12 | End: 2022-10-12

## 2022-10-12 RX ORDER — CHLORHEXIDINE GLUCONATE 213 G/1000ML
1 SOLUTION TOPICAL
Refills: 0 | Status: DISCONTINUED | OUTPATIENT
Start: 2022-10-12 | End: 2022-10-12

## 2022-10-12 RX ORDER — FUROSEMIDE 40 MG
40 TABLET ORAL
Qty: 0 | Refills: 0 | DISCHARGE
Start: 2022-10-12

## 2022-10-12 RX ORDER — CHLORHEXIDINE GLUCONATE 213 G/1000ML
1 SOLUTION TOPICAL
Qty: 0 | Refills: 0 | DISCHARGE
Start: 2022-10-12

## 2022-10-12 RX ADMIN — Medication 1.5 MICROGRAM(S)/MIN: at 01:15

## 2022-10-12 RX ADMIN — Medication 20 MILLIEQUIVALENT(S): at 18:20

## 2022-10-12 RX ADMIN — Medication 1 MILLIGRAM(S): at 00:00

## 2022-10-12 RX ADMIN — Medication 20 MILLIGRAM(S): at 00:20

## 2022-10-12 RX ADMIN — Medication 20 MILLIGRAM(S): at 14:11

## 2022-10-12 RX ADMIN — Medication 10 MILLIGRAM(S): at 21:23

## 2022-10-12 RX ADMIN — CHLORHEXIDINE GLUCONATE 1 APPLICATION(S): 213 SOLUTION TOPICAL at 05:06

## 2022-10-12 RX ADMIN — LEVETIRACETAM 420 MILLIGRAM(S): 250 TABLET, FILM COATED ORAL at 05:06

## 2022-10-12 RX ADMIN — Medication 100 MILLIEQUIVALENT(S): at 06:29

## 2022-10-12 RX ADMIN — Medication 650 MILLIGRAM(S): at 22:00

## 2022-10-12 RX ADMIN — Medication 40 MILLIEQUIVALENT(S): at 06:28

## 2022-10-12 RX ADMIN — Medication 40 MILLIEQUIVALENT(S): at 10:03

## 2022-10-12 RX ADMIN — Medication 100 MILLIEQUIVALENT(S): at 14:49

## 2022-10-12 RX ADMIN — Medication 100 MILLIEQUIVALENT(S): at 16:24

## 2022-10-12 RX ADMIN — Medication 20 MILLIEQUIVALENT(S): at 16:25

## 2022-10-12 RX ADMIN — Medication 650 MILLIGRAM(S): at 21:14

## 2022-10-12 RX ADMIN — Medication 20 MILLIGRAM(S): at 14:48

## 2022-10-12 RX ADMIN — ENOXAPARIN SODIUM 40 MILLIGRAM(S): 100 INJECTION SUBCUTANEOUS at 05:05

## 2022-10-12 RX ADMIN — ONDANSETRON 4 MILLIGRAM(S): 8 TABLET, FILM COATED ORAL at 06:50

## 2022-10-12 RX ADMIN — LEVETIRACETAM 420 MILLIGRAM(S): 250 TABLET, FILM COATED ORAL at 17:40

## 2022-10-12 RX ADMIN — Medication 100 MILLIEQUIVALENT(S): at 17:06

## 2022-10-12 RX ADMIN — Medication 100 MILLIEQUIVALENT(S): at 08:48

## 2022-10-12 RX ADMIN — Medication 100 MILLIEQUIVALENT(S): at 07:32

## 2022-10-12 RX ADMIN — PANTOPRAZOLE SODIUM 40 MILLIGRAM(S): 20 TABLET, DELAYED RELEASE ORAL at 12:18

## 2022-10-12 NOTE — LACTATION INITIAL EVALUATION - LACTATION INTERVENTIONS
Called to consult on 36 yr. old female in ICU on BiPAP, who presented to ER with SOB and edema to lower extremities; reported oxygen sat 80% at home; pt. s/p primary c/s on 10/4/22, POD#8  and has hx. GDM (diet controlled), GHTN (no meds) and chronic anemia; Pt. found in bed, sleepy with pt's father at bedside; introduced self and role to pt. and pt's father; pt. sleepy however, was able to provide permission for a breast exam; breast noted to be full/engorged, no redness noted and is afebrile per RN and PA report; Pt's breast were recently expressed with help of ICU RN utilizing pump provided to pt. ; Reinforced to pt. , pt's father and Primary RN need to express breast and utilize warm compress to soften breast and cold compress to decrease swelling as able every 3 hours; However, pt. too sleepy/groggy to express again at this time; RN aware to encourage/assist with milk expression until pt. is able to express on her own; pt. will be "pumping and dumping" as advised. pt. was able to nod head in understanding;  RN and OB/GYN PA aware of recommendations; will f/u as needed/initiate/review pumping guidelines and safe milk handling

## 2022-10-12 NOTE — H&P ADULT - ATTENDING COMMENTS
35 yo Female,  hx Gestational DM (diet controlled), Gestational HTN (did not require meds), recent LSCS 10/4/2022, Chronic anemia, presenting with gradual progressive shortness of breath and bilateral lower extremity swelling since returning home after LSCS, admitted for Acute hypoxic respiratory failure likely 2/2 Peripartum Cardiomyopathy requiring Bipap.    ASSESSMENT  -Acute hypoxic respiratory failure 2/2 Peripartum Cardiomyopathy  -s/p LSCS   -Chronic anemia  -Hx Gestational DM (diet controlled)  -Hx Gestational HTN (did not require meds)    Ob-Gyn consult  Started on Keppra by Obgyn team for seizure prophylaxis for possible Pre eclampsia  Continuous Tele monitoring   Strict I/O   Cardiology evaluation   Will need GDMT for HFrEF once Echo confirms suspicions from bedside Sono of severe cardiomyopathy with depressed LVEF   Diuresis   Monitor and replete electrolytes keep Mg >2, K >4   NIPPV for resp support - wean as tolerated  Screen for sepsis with Procalcitonin and blood cultures  Supportive care including pain control and DVT prophylaxis   CTA chest once able to lay flat and more clinically stable

## 2022-10-12 NOTE — ED ADULT NURSE NOTE - OBJECTIVE STATEMENT
Pt presents to the ED with c/o shortness of breath and bilateral leg swelling since yesterday. Pt s/p  on 10/04/2022.

## 2022-10-12 NOTE — CONSULT NOTE ADULT - SUBJECTIVE AND OBJECTIVE BOX
C A R D I O L O G Y  *********************    DATE OF SERVICE: 10-12-22    HISTORY OF PRESENT ILLNESS: HPI:  Patient is a 35 yo Female, from home, hx Gestational DM (diet controlled), Gestational HTN (did not require meds), recent LSCS 10/4/2022, Chronic anemia, presenting with gradual progressive shortness of breath and bilateral lower extremity swelling since returning home after LSCS. Pt's O2 saturation at home was recorded to be 80%, hence patient was brought to the ED. Pt needed Bipap in ER, reports no chest pain, fevers, abdominal pain, nausea, vomiting, dysuria. Pt only taking vitamins at home. Father and pt's sister at bedside.   Cardiology is called as peripartum cardiomyopathy is a concer.    In the ED, vitals were  Temp 99.2, -130s, /86, RR 26, 92% RA> given tachypnea and tachycardia> transitioned to Bipap  RR 12, FiO2 100% saturating 99%  Xray chest pulm edema s/p 20 mg IV Lasix x 2 doses> 2500cc Carrion output, started on low dose Nitro drip, titrate to  (12 Oct 2022 01:44)      PAST MEDICAL & SURGICAL HISTORY:  Gestational diabetes      Gestational hypertension      H/O  section              MEDICATIONS:  MEDICATIONS  (STANDING):  chlorhexidine 2% Cloths 1 Application(s) Topical <User Schedule>  enoxaparin Injectable 40 milliGRAM(s) SubCutaneous every 24 hours  influenza   Vaccine 0.5 milliLiter(s) IntraMuscular once  levETIRAcetam  IVPB 500 milliGRAM(s) IV Intermittent every 12 hours  pantoprazole  Injectable 40 milliGRAM(s) IV Push daily  potassium chloride    Tablet ER 40 milliEquivalent(s) Oral once      Allergies    No Known Allergies    Intolerances        FAMILY HISTORY:    Non-contributary for premature coronary disease or sudden cardiac death    SOCIAL HISTORY:    [ X] Non-smoker  [ ] Smoker  [ ] Alcohol        REVIEW OF SYSTEMS:  [ ]chest pain  [ X]shortness of breath  [  ]palpitations  [  ]syncope  [ ]near syncope [ ]upper extremity weakness   [ ] lower extremity weakness  [  ]diplopia  [  ]altered mental status   [  ]fevers  [ ]chills [ ]nausea  [ ]vomitting  [  ]dysphagia    [ ]abdominal pain  [ ]melena  [ ]BRBPR    [  ]epistaxis  [  ]rash    [ ]lower extremity edema        [X] All others negative	  [ ] Unable to obtain      LABS:	 	    CARDIAC MARKERS:        Troponin I, High Sensitivity Result: 529.4 ng/L (10-12-22 @ 03:58)  Troponin I, High Sensitivity Result: 482.7 ng/L (10-12-22 @ 00:20)  Troponin I, High Sensitivity Result: 449.3 ng/L (10-11-22 @ 23:20)                            10.4   11.66 )-----------( 325      ( 12 Oct 2022 03:58 )             32.6     Hb Trend: 10.4<--, 10.9<--    10-12    142  |  108  |  9   ----------------------------<  113<H>  2.6<LL>   |  26  |  0.57    Ca    8.1<L>      12 Oct 2022 03:58  Phos  2.6     10-12  Mg     2.0     10-12    TPro  6.4  /  Alb  2.9<L>  /  TBili  0.3  /  DBili  x   /  AST  27  /  ALT  55  /  AlkPhos  129<H>  10-12    Creatinine Trend: 0.57<--, 0.61<--, 0.47<--, 0.46<--    Coags:  PT/INR - ( 11 Oct 2022 23:20 )   PT: 11.3 sec;   INR: 0.95 ratio         PTT - ( 11 Oct 2022 23:20 )  PTT:35.3 sec    proBNP: Serum Pro-Brain Natriuretic Peptide: 7113 pg/mL (10-11 @ 23:20)    Lipid Profile:   HgA1c:   TSH:         PHYSICAL EXAM:  T(C): 37.7 (10-12-22 @ 07:45), Max: 37.7 (10-12-22 @ 06:45)  HR: 120 (10-12-22 @ 09:30) (98 - 138)  BP: 119/87 (10-12-22 @ 09:30) (118/91 - 166/99)  RR: 23 (10-12-22 @ 09:30) (14 - 28)  SpO2: 94% (10-12-22 @ 09:30) (92% - 100%)  Wt(kg): --   BMI (kg/m2): 29 (10-12-22 @ 02:49)  I&O's Summary    11 Oct 2022 07:01  -  12 Oct 2022 07:00  --------------------------------------------------------  IN: 109 mL / OUT: 800 mL / NET: -691 mL    12 Oct 2022 07:01  -  12 Oct 2022 10:32  --------------------------------------------------------  IN: 203 mL / OUT: 225 mL / NET: -22 mL      HEENT:  (-)icterus (-)pallor  CV: N S1 S2 1/6 LEEANNE (+)2 Pulses B/l  Resp:  mild basilar crackles  B/L, normal effort  GI: (+) BS Soft, NT, ND  Lymph:  (+) mild Edema, (-)obvious lymphadenopathy  Skin: Warm to touch, Normal turgor  Psych: Appropriate mood and affect        TELEMETRY: 	  sinus    ECG:  	Sinus Tach 131 BPM, delayed R wave progression     RADIOLOGY:         CXR:   PENDING     ASSESSMENT/PLAN: 	36y Female  Gestational DM (diet controlled), Gestational HTN (did not require meds), recent LSCS 10/4/2022, Chronic anemia, presenting with gradual progressive shortness of breath and bilateral lower extremity swelling since returning home after LSCS.     - Check echo  - Strict I+O's  - Patient is currently breast feeding  - will follow    I once again thank you for allowing me to participate in the care of your patient.  If you have any questions or concerns please do not hesitate to contact me.    Florencio Meek MD, [facility]  BEEPER [phone]

## 2022-10-12 NOTE — DISCHARGE NOTE PROVIDER - NSDCCAREPROVSEEN_GEN_ALL_CORE_FT
Shireen, Vaughn Malave, Tessa Franks, Parth Meek, Florencio Lundberg, Avelino Balderas, Akiraaa  Juan Miguel, Lázaro Vicente, Yunior

## 2022-10-12 NOTE — H&P ADULT - ASSESSMENT
Patient is a 37 yo Female, from home, hx Gestational DM (diet controlled), Gestational HTN (did not require meds), recent LSCS 10/4/2022, Chronic anemia, presenting with gradual progressive shortness of breath and bilateral lower extremity swelling since returning home after LSCS, admitted for Acute hypoxic respiratory failure likely 2/2 Peripartum Cardiomyopathy requiring Bipap.    ASSESSMENT  -Acute hypoxic respiratory failure 2/2 Peripartum Cardiomyopathy  -s/p LSCS   -Chronic anemia  -Hx Gestational DM  -Hx Gestational HTN    Neuro:    Cardiovascular:    Pulmonary:     Infectious Diseases:    Gastrointestinal:    Renal:    Endo:    Heme/onc:     Skin/ catheter:     Prophylaxis:     Goals of Care:     Dispo:   Patient is a 35 yo Female, from home, hx Gestational DM (diet controlled), Gestational HTN (did not require meds), recent LSCS 10/4/2022, Chronic anemia, presenting with gradual progressive shortness of breath and bilateral lower extremity swelling since returning home after LSCS, admitted for Acute hypoxic respiratory failure likely 2/2 Peripartum Cardiomyopathy requiring Bipap.    ASSESSMENT  -Acute hypoxic respiratory failure 2/2 Peripartum Cardiomyopathy  -s/p LSCS   -Chronic anemia  -Hx Gestational DM (diet controlled)  -Hx Gestational HTN (did not require meds)    Neuro:  No acute issues  Started on Keppra by Obgyn team for seizure prophylaxis    Cardiovascular:  #Fluid overload, likely due to Peripartum cardiomyopathy  s/p recent LSCS last week, surgical site clean  Xray shows pulm edema, bedside POCUS w/extensive B lines and reduced LV contraction  s/p 20 IV lasix x 2 doses> 2.4L urine output  C/w Carrion for close I/O monitoring  C/w Bipap 12/7 RR 12, FiO2 100%, pt currently saturating 100%  C/w Nitroglycerine drip and titrate to   F/u Echo in AM  Sinus tachy on EKG, trop 449>482    Pulmonary:   #Acute hypoxic resp failure 2/2 Peripartum cardiomyopathy  C/w Bipap    Infectious Diseases:  No acute issues    Gastrointestinal:  No acute issues    Renal:  No acute issues    Endo:  NPO for now on Bipap    Heme/onc:   #Chronic anemia  Hb 10.9, baseline around 11  No active s/s bleeding  takes iron supplements at home    Skin/ catheter: Carrion 10/11    Prophylaxis: Lovenox for DVT ppx, Protonix GI ppx    Goals of Care: Full code    Dispo: Accepted to ICU   Patient is a 35 yo Female, from home, hx Gestational DM (diet controlled), Gestational HTN (did not require meds), recent LSCS 10/4/2022, Chronic anemia, presenting with gradual progressive shortness of breath and bilateral lower extremity swelling since returning home after LSCS, admitted for Acute hypoxic respiratory failure likely 2/2 Peripartum Cardiomyopathy requiring Bipap.    ASSESSMENT  -Acute hypoxic respiratory failure 2/2 Peripartum Cardiomyopathy  -s/p LSCS   -Chronic anemia  -Hx Gestational DM (diet controlled)  -Hx Gestational HTN (did not require meds)    Neuro:  No acute issues  Started on Keppra by Obgyn team for seizure prophylaxis    Cardiovascular:  #Fluid overload, likely due to Peripartum cardiomyopathy  s/p recent LSCS last week, surgical site clean  Xray shows pulm edema, bedside POCUS w/extensive B lines and reduced LV contraction  s/p 20 IV lasix x 2 doses> 2.4L urine output  C/w Carrion for close I/O monitoring  C/w Bipap 12/7 RR 12, FiO2 100%, pt currently saturating 100%  C/w Nitroglycerine drip and titrate to   F/u Echo in AM  Sinus tachy on EKG, trop 449>482  Pro BNP 7k    Pulmonary:   #Acute hypoxic resp failure 2/2 Peripartum cardiomyopathy  C/w Bipap    Infectious Diseases:  No acute issues    Gastrointestinal:  No acute issues    Renal:  No acute issues    Endo:  NPO for now on Bipap    Heme/onc:   #Chronic anemia  Hb 10.9, baseline around 11  No active s/s bleeding  takes iron supplements at home    Skin/ catheter: Carrion 10/11    Prophylaxis: Lovenox for DVT ppx, Protonix GI ppx    Goals of Care: Full code    Dispo: Accepted to ICU

## 2022-10-12 NOTE — PROGRESS NOTE ADULT - SUBJECTIVE AND OBJECTIVE BOX
INTERVAL HPI/OVERNIGHT EVENTS: ***    PRESSORS: [ ] YES [ ] NO  WHICH:    ANTIBIOTICS:                  DATE STARTED:    Antimicrobial:    Cardiovascular:  nitroglycerin  Infusion 5 MICROgram(s)/Min IV Continuous <Continuous>    Pulmonary:    Hematalogic:  enoxaparin Injectable 40 milliGRAM(s) SubCutaneous every 24 hours    Other:  chlorhexidine 2% Cloths 1 Application(s) Topical <User Schedule>  influenza   Vaccine 0.5 milliLiter(s) IntraMuscular once  levETIRAcetam  IVPB 500 milliGRAM(s) IV Intermittent every 12 hours  ondansetron Injectable 4 milliGRAM(s) IV Push every 8 hours PRN  pantoprazole  Injectable 40 milliGRAM(s) IV Push daily    chlorhexidine 2% Cloths 1 Application(s) Topical <User Schedule>  enoxaparin Injectable 40 milliGRAM(s) SubCutaneous every 24 hours  influenza   Vaccine 0.5 milliLiter(s) IntraMuscular once  levETIRAcetam  IVPB 500 milliGRAM(s) IV Intermittent every 12 hours  nitroglycerin  Infusion 5 MICROgram(s)/Min IV Continuous <Continuous>  ondansetron Injectable 4 milliGRAM(s) IV Push every 8 hours PRN  pantoprazole  Injectable 40 milliGRAM(s) IV Push daily    Drug Dosing Weight  Height (cm): 154 (11 Oct 2022 22:34)  Weight (kg): 68.7 (12 Oct 2022 02:49)  BMI (kg/m2): 29 (12 Oct 2022 02:49)  BSA (m2): 1.67 (12 Oct 2022 02:49)    CENTRAL LINE: [ ] YES [ ] NO  LOCATION:         ALONSO: [ ] YES [ ] NO          A-LINE:  [ ] YES [ ] NO  LOCATION:         ICU Vital Signs Last 24 Hrs  T(C): 37.7 (12 Oct 2022 07:45), Max: 37.7 (12 Oct 2022 06:45)  T(F): 99.9 (12 Oct 2022 07:45), Max: 99.9 (12 Oct 2022 07:45)  HR: 109 (12 Oct 2022 08:45) (98 - 138)  BP: 130/90 (12 Oct 2022 08:45) (118/91 - 166/99)  BP(mean): 99 (12 Oct 2022 08:45) (94 - 112)  ABP: --  ABP(mean): --  RR: 16 (12 Oct 2022 08:45) (14 - 28)  SpO2: 97% (12 Oct 2022 08:45) (92% - 100%)    O2 Parameters below as of 12 Oct 2022 07:00  Patient On (Oxygen Delivery Method): nasal cannula  O2 Flow (L/min): 3                10-11 @ 07:01  -  10-12 @ 07:00  --------------------------------------------------------  IN: 109 mL / OUT: 800 mL / NET: -691 mL              PHYSICAL EXAM:    GENERAL: NAD, well-groomed, well-developed  HEAD: Nomocephalic, Atraumatic  EYES: EOMI, PERRLA,   NECK: Supple, No JVD; Normal thyroid; Trachea midline  NERVOUS SYSTEM:  Alert & Oriented X3,  Motor Strength 5/5 B/L upper and lower extremities; DTRs 2+ intact and symmetric  CHEST/LUNG: No rales, rhonchi, wheezing   HEART: Regular rate and rhythm; No murmurs,   ABDOMEN: Soft, Nontender, Nondistended; Bowel sounds present  EXTREMITIES:  2+ Peripheral Pulses, No clubbing, cyanosis, or edema  SKIN: No lesions      LABS:  CBC Full  -  ( 12 Oct 2022 03:58 )  WBC Count : 11.66 K/uL  RBC Count : 3.53 M/uL  Hemoglobin : 10.4 g/dL  Hematocrit : 32.6 %  Platelet Count - Automated : 325 K/uL  Mean Cell Volume : 92.4 fl  Mean Cell Hemoglobin : 29.5 pg  Mean Cell Hemoglobin Concentration : 31.9 gm/dL  Auto Neutrophil # : 9.98 K/uL  Auto Lymphocyte # : 0.97 K/uL  Auto Monocyte # : 0.60 K/uL  Auto Eosinophil # : 0.02 K/uL  Auto Basophil # : 0.04 K/uL  Auto Neutrophil % : 85.7 %  Auto Lymphocyte % : 8.3 %  Auto Monocyte % : 5.1 %  Auto Eosinophil % : 0.2 %  Auto Basophil % : 0.3 %    10-12    142  |  108  |  9   ----------------------------<  113<H>  2.6<LL>   |  26  |  0.57    Ca    8.1<L>      12 Oct 2022 03:58  Phos  2.6     10  Mg     2.0     10-12    TPro  6.4  /  Alb  2.9<L>  /  TBili  0.3  /  DBili  x   /  AST  27  /  ALT  55  /  AlkPhos  129<H>  10-12    PT/INR - ( 11 Oct 2022 23:20 )   PT: 11.3 sec;   INR: 0.95 ratio         PTT - ( 11 Oct 2022 23:20 )  PTT:35.3 sec  Urinalysis Basic - ( 12 Oct 2022 01:32 )    Color: Yellow / Appearance: Clear / S.005 / pH: x  Gluc: x / Ketone: Negative  / Bili: Negative / Urobili: Negative   Blood: x / Protein: Negative / Nitrite: Negative   Leuk Esterase: Negative / RBC: 0-2 /HPF / WBC 0-2 /HPF   Sq Epi: x / Non Sq Epi: Few /HPF / Bacteria: Trace /HPF          RADIOLOGY & ADDITIONAL STUDIES REVIEWED:  ***    [ ]GOALS OF CARE DISCUSSION WITH PATIENT/FAMILY/PROXY:    CRITICAL CARE TIME SPENT: 35 minutes INTERVAL HPI/OVERNIGHT EVENTS: Nitroglycerin drip d/c this AM. Patient was given 20mg IV lasix. On 5LNC saturating well.     PRESSORS: [ ] YES [X ] NO  WHICH:    ANTIBIOTICS:                  DATE STARTED:    Antimicrobial:    Cardiovascular:  nitroglycerin  Infusion 5 MICROgram(s)/Min IV Continuous <Continuous>    Pulmonary:    Hematalogic:  enoxaparin Injectable 40 milliGRAM(s) SubCutaneous every 24 hours    Other:  chlorhexidine 2% Cloths 1 Application(s) Topical <User Schedule>  influenza   Vaccine 0.5 milliLiter(s) IntraMuscular once  levETIRAcetam  IVPB 500 milliGRAM(s) IV Intermittent every 12 hours  ondansetron Injectable 4 milliGRAM(s) IV Push every 8 hours PRN  pantoprazole  Injectable 40 milliGRAM(s) IV Push daily    chlorhexidine 2% Cloths 1 Application(s) Topical <User Schedule>  enoxaparin Injectable 40 milliGRAM(s) SubCutaneous every 24 hours  influenza   Vaccine 0.5 milliLiter(s) IntraMuscular once  levETIRAcetam  IVPB 500 milliGRAM(s) IV Intermittent every 12 hours  nitroglycerin  Infusion 5 MICROgram(s)/Min IV Continuous <Continuous>  ondansetron Injectable 4 milliGRAM(s) IV Push every 8 hours PRN  pantoprazole  Injectable 40 milliGRAM(s) IV Push daily    Drug Dosing Weight  Height (cm): 154 (11 Oct 2022 22:34)  Weight (kg): 68.7 (12 Oct 2022 02:49)  BMI (kg/m2): 29 (12 Oct 2022 02:49)  BSA (m2): 1.67 (12 Oct 2022 02:49)    CENTRAL LINE: [ ] YES [x ] NO  LOCATION:         ALONSO: [x] YES [ ] NO          A-LINE:  [ ] YES [x ] NO  LOCATION:         ICU Vital Signs Last 24 Hrs  T(C): 37.7 (12 Oct 2022 07:45), Max: 37.7 (12 Oct 2022 06:45)  T(F): 99.9 (12 Oct 2022 07:45), Max: 99.9 (12 Oct 2022 07:45)  HR: 109 (12 Oct 2022 08:45) (98 - 138)  BP: 130/90 (12 Oct 2022 08:45) (118/91 - 166/99)  BP(mean): 99 (12 Oct 2022 08:45) (94 - 112)  ABP: --  ABP(mean): --  RR: 16 (12 Oct 2022 08:45) (14 - 28)  SpO2: 97% (12 Oct 2022 08:45) (92% - 100%)    O2 Parameters below as of 12 Oct 2022 07:00  Patient On (Oxygen Delivery Method): nasal cannula  O2 Flow (L/min): 3                10-11 @ 07:01  -  10-12 @ 07:00  --------------------------------------------------------  IN: 109 mL / OUT: 800 mL / NET: -691 mL              PHYSICAL EXAM:    GENERAL: mild resp distress  HEAD: Nomocephalic, Atraumatic  EYES: EOMI, PERRLA,   NECK: Supple, No JVD; Normal thyroid; Trachea midline  NERVOUS SYSTEM:  Alert & Oriented X3,  Motor Strength 5/5 B/L upper and lower extremities; DTRs 2+ intact and symmetric  CHEST/LUNG: crackles b/l   HEART: Regular rate and rhythm; No murmurs,   ABDOMEN: Soft, Nontender, Nondistended; Bowel sounds present  EXTREMITIES:  2+ pitting edema b/l   SKIN: No lesions      LABS:  CBC Full  -  ( 12 Oct 2022 03:58 )  WBC Count : 11.66 K/uL  RBC Count : 3.53 M/uL  Hemoglobin : 10.4 g/dL  Hematocrit : 32.6 %  Platelet Count - Automated : 325 K/uL  Mean Cell Volume : 92.4 fl  Mean Cell Hemoglobin : 29.5 pg  Mean Cell Hemoglobin Concentration : 31.9 gm/dL  Auto Neutrophil # : 9.98 K/uL  Auto Lymphocyte # : 0.97 K/uL  Auto Monocyte # : 0.60 K/uL  Auto Eosinophil # : 0.02 K/uL  Auto Basophil # : 0.04 K/uL  Auto Neutrophil % : 85.7 %  Auto Lymphocyte % : 8.3 %  Auto Monocyte % : 5.1 %  Auto Eosinophil % : 0.2 %  Auto Basophil % : 0.3 %    10-12    142  |  108  |  9   ----------------------------<  113<H>  2.6<LL>   |  26  |  0.57    Ca    8.1<L>      12 Oct 2022 03:58  Phos  2.6     10-12  Mg     2.0     10-12    TPro  6.4  /  Alb  2.9<L>  /  TBili  0.3  /  DBili  x   /  AST  27  /  ALT  55  /  AlkPhos  129<H>  10    PT/INR - ( 11 Oct 2022 23:20 )   PT: 11.3 sec;   INR: 0.95 ratio         PTT - ( 11 Oct 2022 23:20 )  PTT:35.3 sec  Urinalysis Basic - ( 12 Oct 2022 01:32 )    Color: Yellow / Appearance: Clear / S.005 / pH: x  Gluc: x / Ketone: Negative  / Bili: Negative / Urobili: Negative   Blood: x / Protein: Negative / Nitrite: Negative   Leuk Esterase: Negative / RBC: 0-2 /HPF / WBC 0-2 /HPF   Sq Epi: x / Non Sq Epi: Few /HPF / Bacteria: Trace /HPF          RADIOLOGY & ADDITIONAL STUDIES REVIEWED:  ***    [ ]GOALS OF CARE DISCUSSION WITH PATIENT/FAMILY/PROXY:    CRITICAL CARE TIME SPENT: 35 minutes

## 2022-10-12 NOTE — PROGRESS NOTE ADULT - ASSESSMENT
Patient is a 37 yo Female, from home, hx Gestational DM (diet controlled), Gestational HTN (did not require meds), recent LSCS 10/4/2022, Chronic anemia, presenting with gradual progressive shortness of breath and bilateral lower extremity swelling since returning home after LSCS, admitted for Acute hypoxic respiratory failure likely 2/2 Peripartum Cardiomyopathy requiring Bipap.    ASSESSMENT  -Acute hypoxic respiratory failure 2/2 Peripartum Cardiomyopathy  -s/p LSCS   -Chronic anemia  -Hx Gestational DM (diet controlled)  -Hx Gestational HTN (did not require meds)    Neuro:  No acute issues  Started on Keppra by Obgyn team for seizure prophylaxis    Cardiovascular:  #Fluid overload, likely due to Peripartum cardiomyopathy  s/p recent LSCS last week, surgical site clean  Xray shows pulm edema, bedside POCUS w/extensive B lines and reduced LV contraction  s/p 20 IV lasix x 2 doses> 2.4L urine output  C/w Carrion for close I/O monitoring  C/w Bipap 12/7 RR 12, FiO2 100%, pt currently saturating 100%  C/w Nitroglycerine drip and titrate to   F/u Echo in AM  Sinus tachy on EKG, trop 449>482  Pro BNP 7k    Pulmonary:   #Acute hypoxic resp failure 2/2 Peripartum cardiomyopathy  C/w Bipap    Infectious Diseases:  No acute issues    Gastrointestinal:  No acute issues    Renal:  No acute issues    Endo:  NPO for now on Bipap    Heme/onc:   #Chronic anemia  Hb 10.9, baseline around 11  No active s/s bleeding  takes iron supplements at home    Skin/ catheter: Carrion 10/11    Prophylaxis: Lovenox for DVT ppx, Protonix GI ppx    Goals of Care: Full code    Dispo: Accepted to ICU   Patient is a 37 yo Female, from home, hx Gestational DM (diet controlled), Gestational HTN (did not require meds), recent LSCS 10/4/2022, Chronic anemia, presenting with gradual progressive shortness of breath and bilateral lower extremity swelling since returning home after LSCS, admitted for Acute hypoxic respiratory failure likely 2/2 Peripartum Cardiomyopathy requiring Bipap.    ASSESSMENT  -Acute hypoxic respiratory failure 2/2 Peripartum Cardiomyopathy  -s/p LSCS   -Chronic anemia  -Hx Gestational DM (diet controlled)  -Hx Gestational HTN (did not require meds)    Neuro:  No acute issues  Started on Keppra by Obgyn team for seizure prophylaxis. will d/c after 24 hours    Cardiovascular:  #Fluid overload, likely due to Peripartum cardiomyopathy  s/p recent LSCS last week, surgical site clean  Xray shows pulm edema, bedside POCUS w/extensive B lines and reduced LV contraction  s/p 20 IV lasix x 2 doses> 2.4L urine output  C/w Carrion for close I/O monitoring  C/w Bipap 12/7 RR 12, FiO2 100%, pt currently saturating 100%  C/w Nitroglycerine drip and titrate to   F/u Echo in AM  Sinus tachy on EKG, trop 449>482  Pro BNP 7k  f/u echo  s/p 20mg IV lasix  Cardio Dr. Meek  nitro drip d/c     Pulmonary:   #Acute hypoxic resp failure 2/2 Peripartum cardiomyopathy  On 5LNC     Infectious Diseases:  No acute issues    Gastrointestinal:  No acute issues    Renal:  No acute issues    Endo:  NPO for now on Bipap    Heme/onc:   #Chronic anemia  Hb 10.9, baseline around 11  No active s/s bleeding  takes iron supplements at home    Skin/ catheter: Carrion 10/11    Prophylaxis: Lovenox for DVT ppx, Protonix GI ppx    Goals of Care: Full code    Dispo: Accepted to ICU

## 2022-10-12 NOTE — PATIENT PROFILE ADULT - FALL HARM RISK - RISK INTERVENTIONS
Assistance OOB with selected safe patient handling equipment/Assistance with ambulation/Communicate Fall Risk and Risk Factors to all staff, patient, and family/Discuss with provider need for PT consult/Monitor gait and stability/Reinforce activity limits and safety measures with patient and family/Review medications for side effects contributing to fall risk/Sit up slowly, dangle for a short time, stand at bedside before walking/Toileting schedule using arm’s reach rule for commode and bathroom/Use of alarms - bed, chair and/or voice tab/Visual Cue: Yellow wristband/Bed in lowest position, wheels locked, appropriate side rails in place/Call bell, personal items and telephone in reach/Instruct patient to call for assistance before getting out of bed or chair/Non-slip footwear when patient is out of bed/[place] to call system/Physically safe environment - no spills, clutter or unnecessary equipment/Purposeful Proactive Rounding/Room/bathroom lighting operational, light cord in reach

## 2022-10-12 NOTE — H&P ADULT - NSHPPHYSICALEXAM_GEN_ALL_CORE
PHYSICAL EXAM:  GENERAL: Young female, sitting upright, mild distress  HEAD:  Atraumatic, Normocephalic  EYES: EOMI, PERRLA, conjunctiva and sclera clear  NECK: Supple, No JVD  CHEST/LUNG: Clear to auscultation bilaterally; No wheeze  HEART: Regular rate and rhythm; s1+ s2+  ABDOMEN: Soft, Nontender, Nondistended; Bowel sounds present  EXTREMITIES:, No clubbing, cyanosis, or edema  NEUROLOGY:AAOx3 non-focal  SKIN: No rashes or lesions PHYSICAL EXAM:  GENERAL: Young female, sitting upright, mild distress  HEAD:  Atraumatic, Normocephalic  EYES: EOMI, PERRLA, conjunctiva and sclera clear  NECK: Supple, No JVD  CHEST/LUNG: bilateral crackles   HEART: Regular rate and rhythm; s1+ s2+  ABDOMEN: Soft, Nontender, Nondistended; Bowel sounds present, linea nigra+  EXTREMITIES:, 2++ pitting edema  NEUROLOGY: AAOx3 non-focal  SKIN: No rashes or lesions

## 2022-10-12 NOTE — CONSULT NOTE ADULT - SUBJECTIVE AND OBJECTIVE BOX
HPI: 37yo  s/p primary c/s @ 39.4wks complicated by GDMA1 and gHTN (not requiring meds) Presents to the ED with acute onset shortness of breath. Pt reports feeling weak and worsening shortness of breath at home taking her vitals at home noting elevated heart rate and low oxygen sat to 80%. Brought to the hospital by her mother that reports she is feeling very weak, tired, sick and repeatedly said she felt like she was "dying". Pt reports vaginal bleeding is light, denies odor or change in bleeding or other vaginal discharge. Otherwise offers no complaints. Limited history and ROS due to pt tachypnea and shortness of breath, limited interval history obtained via patient and mother.   Denies pelvic pain, abdominal pain, chest pain, palpitations, n/v/d/c, fever, chills or any other complaints   GYN care at Dr. Rose, last pap smear normal     pob/gynhx:  (1) primary elective c/s @ 39.4wks, full term, c/b gdma1 and gHTN  Pt denies std's, abnormal pap smear, fibroids or ovarian cysts  pmhx:  pshx:  allergies:   meds:   sochx: Pt denies etoh/drug/tobacco use  Psych denies h/o anxiety or depression    REVIEW OF SYSTEMS: see HPI	    PE:  Vital Signs Last 24 Hrs  T(C): 37.3 (11 Oct 2022 22:34), Max: 37.3 (11 Oct 2022 22:34)  T(F): 99.2 (11 Oct 2022 22:34), Max: 99.2 (11 Oct 2022 22:34)  HR: 104 (12 Oct 2022 01:14) (101 - 130)  BP: 166/99 (11 Oct 2022 23:47) (143/86 - 166/99)  BP(mean): --  RR: 26 (11 Oct 2022 23:47) (26 - 26)  SpO2: 96% (12 Oct 2022 01:14) (92% - 98%)    Parameters below as of 11 Oct 2022 23:47  Patient On (Oxygen Delivery Method): mask, nonrebreather      Gen: A&Ox3, NAD  abd: +bs; soft, nt, nd, no rebound or guarding; no cvat b/l  pelvis: no cervical motion tenderness; no vaginal bleeding or abnormal discharge; no odor, cervix closed/long, no uterine tenderness; uterus approx 8wks size regular in contour, mobile. No adnexal tenderness or masses appreciated b/l     LABS:                        10.9   8.24  )-----------( 336      ( 11 Oct 2022 23:20 )             34.3     10-11    145  |  112<H>  |  9   ----------------------------<  88  3.6   |  23  |  0.61    Ca    8.4      11 Oct 2022 23:20    TPro  6.6  /  Alb  2.7<L>  /  TBili  0.3  /  DBili  x   /  AST  44<H>  /  ALT  60  /  AlkPhos  136<H>  10-11    PT/INR - ( 11 Oct 2022 23:20 )   PT: 11.3 sec;   INR: 0.95 ratio         PTT - ( 11 Oct 2022 23:20 )  PTT:35.3 sec      RADIOLOGY & ADDITIONAL STUDIES:  sono  ct scan    A/p:     -d/w  HPI: 37yo  s/p primary c/s @ 39.4wks on 10/4/22 complicated by GDMA1 and gHTN (not requiring meds) and acute blood loss anemia Presents to the ED with acute onset shortness of breath. Pt reports feeling weak and worsening shortness of breath at home taking her vitals at home noting elevated heart rate and low oxygen sat to 80%. Brought to the hospital by her mother that reports she is feeling very weak, tired, sick and repeatedly said she felt like she was "dying". Pt reports vaginal bleeding is light, denies odor or change in bleeding or other vaginal discharge. Otherwise offers no complaints. Lower extremity edema noted, pt reports slight improvement since discharge. Limited history and ROS due to pt tachypnea and shortness of breath, limited interval history obtained via patient and mother.   Denies pelvic pain, abdominal pain, chest pain, palpitations, n/v/d/c, fever, chills or any other complaints   GYN care at Dr. Rose, last pap smear normal     pob/gynhx:  (1) 10/4/22 primary elective c/s @ 39.4wks, full term, c/b gdma1 and gHTN  Pt denies std's, abnormal pap smear, fibroids or ovarian cysts  pmhx: asthma (last attack in , hospitalized for one day, denies intubation or other hospitalizations)  pshx: c/s as noted above   allergies: nkda, admits to seasonal allergies   meds: albuterol inhaler prn, pnv   sochx: Pt denies etoh/drug/tobacco use  Psych denies h/o anxiety or depression or trauma or abuse     REVIEW OF SYSTEMS: see HPI	    PE:  Vital Signs Last 24 Hrs  T(C): 37.3 (11 Oct 2022 22:34), Max: 37.3 (11 Oct 2022 22:34)  T(F): 99.2 (11 Oct 2022 22:34), Max: 99.2 (11 Oct 2022 22:34)  HR: 104 (12 Oct 2022 01:14) (101 - 130)  BP: 166/99 (11 Oct 2022 23:47) (143/86 - 166/99)  BP(mean): --  RR: 26 (11 Oct 2022 23:47) (26 - 26)  SpO2: 96% (12 Oct 2022 01:14) (92% - 98%)    Parameters below as of 11 Oct 2022 23:47  Patient On (Oxygen Delivery Method): mask, nonrebreather    Bedside vitals as listed below:   bp 166/99 -> 157/111 -> 145/96   hr 120-175  spo2 92% on 50% ventimask, now 98% on bipap    Gen: aaox3, toxic appearing, anxious appearing, pale, diaphoretic   cardio: tachycardic, difficult to discern if murmur present due to tachycardia  resp: basilar crackles noted b/l L>R, no wheezes or ronchi appreciated   abd: soft, nontender, no guarding or rigidity   ext: 2+ pitting edema, no calf tenderness  pelvis: deferred    BIPAP mask in place, noted to be large with air leak, respiratory notified to obtain smaller mask    LABS:                        10.9   8.24  )-----------( 336      ( 11 Oct 2022 23:20 )             34.3     10-    145  |  112<H>  |  9   ----------------------------<  88  3.6   |  23  |  0.61    Ca    8.4      11 Oct 2022 23:20    TPro  6.6  /  Alb  2.7<L>  /  TBili  0.3  /  DBili  x   /  AST  44<H>  /  ALT  60  /  AlkPhos  136<H>  10-11    PT/INR - ( 11 Oct 2022 23:20 )   PT: 11.3 sec;   INR: 0.95 ratio         PTT - ( 11 Oct 2022 23:20 )  PTT:35.3 sec      RADIOLOGY & ADDITIONAL STUDIES:  troponin 449  bnp 713  ast 44 alt 60  portable CXR pending official read - bedside prelim noted to have b/l pulm edema w blunting of costrophrenic angles   echo pending  cta chest pending     A/p: 37yo  POD#8 s/p primary c/s @ 39.4wks on 10/4/22 complicated by GDMA1 and gHTN (not requiring meds) presenting to ED with acute onset shortness of breath on admission noted to be tachypneic tachycardic and hypoxic requiring BIPAP, preeclampsia with severe features (pulmonary edema) noted to have uptrending LFT; likely cardiomyopathy r/o pulmonary embolism; now admitted to ICU for further management   - discussed with [facility] fellow Dr. Rain - recommend keppra 500mg 12hr x 24hrs and theraputic heparin, cont serial BP monitoring, if pt reaches severe bp for labetalol ivp per protocol, trend PIH labs (LFTs noted to be uptrending)  - follow up serial labs   - follow up echo, cta chest   - for continuous monitoring   - s/p anxiolytic x 1, s/p lasix 20 x 1   - cont prenatal vitamin, iron  - recommend bowel regimen w PPI, senna, MOM prn   - breast pump   - will continue to follow closely   - rest of care per primary ICU team  -d/w Dr. Rose  - d/w dr. rain, [facility]   - d/w dr. edmond, [place] attending    HPI: 35yo  s/p primary c/s @ 39.4wks on 10/4/22 complicated by GDMA1 and gHTN (not requiring meds) and acute blood loss anemia Presents to the ED with acute onset shortness of breath. Pt reports feeling weak and worsening shortness of breath at home taking her vitals at home noting elevated heart rate and low oxygen sat to 80%. Brought to the hospital by her mother that reports she is feeling very weak, tired, sick and repeatedly said she felt like she was "dying". Pt reports vaginal bleeding is light, denies odor or change in bleeding or other vaginal discharge. Otherwise offers no complaints. Lower extremity edema noted, pt reports slight improvement since discharge. Limited history and ROS due to pt tachypnea and shortness of breath, limited interval history obtained via patient and mother.   Denies pelvic pain, abdominal pain, chest pain, palpitations, n/v/d/c, fever, chills or any other complaints   GYN care at Dr. Rose, last pap smear normal     pob/gynhx:  (1) 10/4/22 primary elective c/s @ 39.4wks, full term, c/b gdma1 and gHTN  Pt denies std's, abnormal pap smear, fibroids or ovarian cysts  pmhx: asthma (last attack in , hospitalized for one day, denies intubation or other hospitalizations)  pshx: c/s as noted above   allergies: nkda, admits to seasonal allergies   meds: albuterol inhaler prn, pnv   sochx: Pt denies etoh/drug/tobacco use  Psych denies h/o anxiety or depression or trauma or abuse     REVIEW OF SYSTEMS: see HPI	    PE:  Vital Signs Last 24 Hrs  T(C): 37.3 (11 Oct 2022 22:34), Max: 37.3 (11 Oct 2022 22:34)  T(F): 99.2 (11 Oct 2022 22:34), Max: 99.2 (11 Oct 2022 22:34)  HR: 104 (12 Oct 2022 01:14) (101 - 130)  BP: 166/99 (11 Oct 2022 23:47) (143/86 - 166/99)  BP(mean): --  RR: 26 (11 Oct 2022 23:47) (26 - 26)  SpO2: 96% (12 Oct 2022 01:14) (92% - 98%)    Parameters below as of 11 Oct 2022 23:47  Patient On (Oxygen Delivery Method): mask, nonrebreather    Bedside vitals as listed below:   bp 166/99 -> 157/111 -> 145/96   hr 120-175  spo2 92% on 50% ventimask, now 98% on bipap    Gen: aaox3, toxic appearing, anxious appearing, pale, diaphoretic   cardio: tachycardic, difficult to discern if murmur present due to tachycardia  resp: basilar crackles noted b/l L>R, no wheezes or ronchi appreciated   abd: soft, nontender, no guarding or rigidity   ext: 2+ pitting edema, no calf tenderness  pelvis: deferred    BIPAP mask in place, noted to be large with air leak, respiratory notified to obtain smaller mask    LABS:                        10.9   8.24  )-----------( 336      ( 11 Oct 2022 23:20 )             34.3     10-    145  |  112<H>  |  9   ----------------------------<  88  3.6   |  23  |  0.61    Ca    8.4      11 Oct 2022 23:20    TPro  6.6  /  Alb  2.7<L>  /  TBili  0.3  /  DBili  x   /  AST  44<H>  /  ALT  60  /  AlkPhos  136<H>  10-11    PT/INR - ( 11 Oct 2022 23:20 )   PT: 11.3 sec;   INR: 0.95 ratio         PTT - ( 11 Oct 2022 23:20 )  PTT:35.3 sec      RADIOLOGY & ADDITIONAL STUDIES:  troponin 449  bnp 713  ast 44 alt 60  portable CXR pending official read - bedside prelim noted to have b/l pulm edema w blunting of costrophrenic angles   echo pending  cta chest pending     A/p: 35yo  POD#8 s/p primary c/s @ 39.4wks on 10/4/22 complicated by GDMA1 and gHTN (not requiring meds) presenting to ED with acute onset shortness of breath on admission noted to be tachypneic tachycardic and hypoxic requiring BIPAP, preeclampsia with severe features (pulmonary edema) noted to have uptrending LFT; likely cardiomyopathy r/o pulmonary embolism; now admitted to ICU for further management   - discussed with [facility] fellow Dr. Rain - recommend keppra 500mg 12hr x 24hrs and theraputic heparin, cont serial BP monitoring, if pt reaches severe bp for labetalol ivp per protocol, trend PIH labs (LFTs noted to be uptrending)  - follow up cardiology recs  - follow up serial labs   - follow up echo, cta chest   - for continuous monitoring   - s/p anxiolytic x 1, s/p lasix 20 x 1   - cont prenatal vitamin, iron  - recommend bowel regimen w PPI, senna, MOM prn   - breast pump   - will continue to follow closely   - rest of care per primary ICU team  -d/w Dr. Rose  - d/w dr. rain, [facility]   - d/w dr. edmond, [place] attending    HPI: 35yo  s/p primary c/s @ 39.4wks on 10/4/22 complicated by GDMA1 and gHTN (not requiring meds) and acute blood loss anemia Presents to the ED with acute onset shortness of breath. Pt reports feeling weak and worsening shortness of breath at home taking her vitals at home noting elevated heart rate and low oxygen sat to 80%. Brought to the hospital by her mother that reports she is feeling very weak, tired, sick and repeatedly said she felt like she was "dying". Pt reports vaginal bleeding is light, denies odor or change in bleeding or other vaginal discharge. Otherwise offers no complaints. Lower extremity edema noted, pt reports slight improvement since discharge. Limited history and ROS due to pt tachypnea and shortness of breath, limited interval history obtained via patient and mother.   Denies pelvic pain, abdominal pain, chest pain, palpitations, n/v/d/c, fever, chills or any other complaints   GYN care at Dr. Rose, last pap smear normal     pob/gynhx:  (1) 10/4/22 primary elective c/s @ 39.4wks, full term, c/b gdma1 and gHTN  Pt denies std's, abnormal pap smear, fibroids or ovarian cysts  pmhx: asthma (last attack in , hospitalized for one day, denies intubation or other hospitalizations)  pshx: c/s as noted above   allergies: nkda, admits to seasonal allergies   meds: albuterol inhaler prn, pnv   sochx: Pt denies etoh/drug/tobacco use  Psych denies h/o anxiety or depression or trauma or abuse     REVIEW OF SYSTEMS: see HPI	    PE:  Vital Signs Last 24 Hrs  T(C): 37.3 (11 Oct 2022 22:34), Max: 37.3 (11 Oct 2022 22:34)  T(F): 99.2 (11 Oct 2022 22:34), Max: 99.2 (11 Oct 2022 22:34)  HR: 104 (12 Oct 2022 01:14) (101 - 130)  BP: 166/99 (11 Oct 2022 23:47) (143/86 - 166/99)  BP(mean): --  RR: 26 (11 Oct 2022 23:47) (26 - 26)  SpO2: 96% (12 Oct 2022 01:14) (92% - 98%)    Parameters below as of 11 Oct 2022 23:47  Patient On (Oxygen Delivery Method): mask, nonrebreather    Bedside vitals as listed below:   bp 166/99 -> 157/111 -> 145/96   hr 120-175  spo2 92% on 50% ventimask, now 98% on bipap    Gen: aaox3, toxic appearing, anxious appearing, pale, diaphoretic   cardio: tachycardic, difficult to discern if murmur present due to tachycardia  resp: basilar crackles noted b/l L>R, no wheezes or ronchi appreciated   abd: soft, nontender, no guarding or rigidity   ext: 2+ pitting edema, no calf tenderness, DTR difficult to obtain secondary to pt movement   pelvis: deferred    BIPAP mask in place, noted to be large with air leak, respiratory notified to obtain smaller mask    LABS:                        10.9   8.24  )-----------( 336      ( 11 Oct 2022 23:20 )             34.3     10-11    145  |  112<H>  |  9   ----------------------------<  88  3.6   |  23  |  0.61    Ca    8.4      11 Oct 2022 23:20    TPro  6.6  /  Alb  2.7<L>  /  TBili  0.3  /  DBili  x   /  AST  44<H>  /  ALT  60  /  AlkPhos  136<H>  10-11    PT/INR - ( 11 Oct 2022 23:20 )   PT: 11.3 sec;   INR: 0.95 ratio         PTT - ( 11 Oct 2022 23:20 )  PTT:35.3 sec      RADIOLOGY & ADDITIONAL STUDIES:  troponin 449  bnp 713  ast 44 alt 60  portable CXR pending official read - bedside prelim noted to have b/l pulm edema w blunting of costrophrenic angles   echo pending  cta chest pending     A/p: 35yo  POD#8 s/p primary c/s @ 39.4wks on 10/4/22 complicated by GDMA1 and gHTN (not requiring meds) presenting to ED with acute onset shortness of breath on admission noted to be tachypneic tachycardic and hypoxic requiring BIPAP, preeclampsia with severe features (pulmonary edema) noted to have uptrending LFT; likely cardiomyopathy r/o pulmonary embolism; now admitted to ICU for further management   - discussed with [facility] fellow Dr. Rain - recommend keppra 500mg 12hr x 24hrs and theraputic heparin, cont serial BP monitoring, if pt reaches severe bp for labetalol ivp per protocol, trend PIH labs (LFTs noted to be uptrending)  - follow up cardiology recs  - follow up serial labs   - follow up echo, cta chest   - for continuous monitoring   - s/p anxiolytic x 1, s/p lasix 20 x 1   - cont prenatal vitamin, iron  - recommend bowel regimen w PPI, senna, MOM prn   - breast pump   - will continue to follow closely   - rest of care per primary ICU team  -d/w Dr. Rose  - d/w dr. rain, [facility]   - d/w dr. edmond, [place] attending

## 2022-10-12 NOTE — ACUTE INTERFACILITY TRANSFER NOTE - PLAN OF CARE
Patient p/w profound SOB w/o2 sat at 80%, pitting bilateral LE edema s/p recent LSCS a week ago, started on IV nitro drip> transitioned to IV Lasix 40 mg BID, Hydralazine and Isordil  EF noted to be 15-20%, Grade III-IV diastolic dysfunction, severe global LV systolic LV dysfunction, moderately dilated LA. Transitioned from Bipap>> nasal cannula, saturating well  Transferring to [facility] CCU for continued care

## 2022-10-12 NOTE — CONSULT NOTE ADULT - ASSESSMENT
A/p: 35yo  POD#8 s/p primary c/s @ 39.4wks on 10/4/22 complicated by GDMA1 and gHTN (not requiring meds) presenting to ED with acute onset shortness of breath on admission noted to be tachypneic tachycardic and hypoxic requiring BIPAP, preeclampsia with severe features (pulmonary edema) noted to have uptrending LFT; likely cardiomyopathy r/o pulmonary embolism; now admitted to ICU for further management   - discussed with [facility] fellow Dr. Rain - recommend keppra 500mg 12hr x 24hrs and theraputic heparin, cont serial BP monitoring, if pt reaches severe bp for labetalol ivp per protocol, trend PIH labs (LFTs noted to be uptrending)   - cont bipap and wean per primary team   - follow up echo, cta chest   - for continuous monitoring   - s/p anxiolytic x 1, s/p lasix 20 x 1   - cont prenatal vitamin, iron  - recommend bowel regimen w PPI, senna, MOM prn   - breast pump   - will continue to follow closely   - rest of care per primary ICU team  -d/w Dr. Rose  - d/w dr. rain, [facility]   - d/w beatriz tellez attending  A/p: 37yo  POD#8 s/p primary c/s @ 39.4wks on 10/4/22 complicated by GDMA1 and gHTN (not requiring meds) presenting to ED with acute onset shortness of breath on admission noted to be tachypneic tachycardic and hypoxic requiring BIPAP, preeclampsia with severe features (pulmonary edema) noted to have uptrending LFT; likely cardiomyopathy r/o pulmonary embolism; now admitted to ICU for further management   - discussed with [facility] fellow Dr. Rain - recommend keppra 500mg 12hr x 24hrs and theraputic heparin, cont serial BP monitoring, if pt reaches severe bp for labetalol ivp per protocol, trend PIH labs (LFTs noted to be uptrending)   - cont bipap and wean per primary team   - follow up echo, cta chest   - follow up cardiology recs   - for continuous monitoring   - s/p anxiolytic x 1, s/p lasix 20 x 1   - cont prenatal vitamin, iron  - recommend bowel regimen w PPI, senna, MOM prn   - breast pump   - will continue to follow closely   - rest of care per primary ICU team  -d/w Dr. Rose  - d/w dr. rain, [facility]   - d/w beatriz tellez attending

## 2022-10-12 NOTE — LACTATION INITIAL EVALUATION - POTENTIAL FOR
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  She was seen by her oncologist at the outpatient office today with routine labs with hemoglobin 8.6,  then had large black bowel movement subsequently hemoglobin repeated found to be 7.1.  Sent to the ED for further evaluation. Initially in the ED afebrile, VSS, P 80, /72.  Labs showed CR 0.85, electrolytes stable, BUN 30, troponin negative, INR 1, Hb 7.1 (7-8s recent baseline), WBC 7.4, , PLT 82.  In the ED he was given a loading dose of IV, GI cocktail, magnesium IV repletion, Zofran, and 1L NS bolus.  Currently been continued on  PPI IV twice daily. Patient is chronically on Lovenox, last dose was  6 PM, although she was not able to inject the full dose secondary to pain.    Patient has had no further bowel movements or episodes of clots per rectum since admission to the hospital. Recently was treated for sinusitis with clarithromycin, states she had doxycycline as well last week and feels that it \"tore up her stomach.\"  She reports 8/10 episode of epistaxis and was able to spontaneously resolved 2 days ago. Denies previous similar episode.  Denies NSAID use or aspirin.  Takes Tums as needed.  No history  of EGD in the past.  Had a colonoscopy after her cancer diagnosis.  Denies history of previous GI ulcers.  Last p.o. was at 830 this a.m. Denies fever, chills,  CP, S OB, dizziness, abdominal pain at this time.  Has mild nausea at the time of examination.     Review of SystemNegative for all 13 systems except as mentioned per history of present illness    pmh:  Bilateral pulmonary emboli 2018  Acute DVT 2018   Recurrent endometrial cancer clear cell grade 3 is post HAVEN/BSO originally diagnosed in     PSH:  Hysterectomy, nephrectomy, cholecystectomy ,     SH: Distant history of social tobacco use, non-smoker, was a nurse, lives alone and has 2 children    FH: Brother had myocardial infarction    Allergies  Compazine Hallucinations  metoprolol Hallucinations  penicillin Swelling  propranolol Hallucinations  sulfADIAZINE Swelling    Home Medications List  Home Medications (12) Active  Ativan oral 0.5 mg tablet 0.5 mg = 1 tab, PRN, Oral, Q Bedtime  dexaMETHasone oral 4 mg tablet (Decadron) 20 mg, Oral  loperamide oral 2 mg capsule (Imodium) 2 mg = 1 cap, PRN, Oral, Q4H  Lovenox 80 mg/0.8 mL injectable solution 80 mg = 0.8 mL, Subcutaneous, Q Bedtime  magnesium oxide oral 400 mg tablet (Mag-Ox 400) 800 mg = 2 tab, Oral, Daily  midodrine oral 5 mg tablet (ProAmatine) 2.5 mg = 0.5 tab, Oral, Daily  nadolol 20 mg oral tablet 10 mg = 0.5 tab, Oral, Daily  Pepcid oral 20 mg tablet 20 mg = 1 tab, PRN, Oral, Daily  potassium CHLORIDE oral 20 mEq ER tablet (K-Dur) 20 mEq = 1 tab, Oral, BID  Tums (carbonate) oral 500 mg chewable tablet [elemental Ca 200 mg] 500 mg = 1 tab, PRN, Chewed, BID  Tylenol Regular Strength oral 325 mg tablet 325 mg = 1 tab, PRN, Oral, Q4H  Zofran oral 4 mg tablet 4 mg = 1 tab, PRN, Oral, Q8H      Physical Exam  Vitals between:   2019 18:42:48   TO   2019 18:42:48                   LAST RESULT MINIMUM MAXIMUM  Temperature 35.4 35.4 36.1  Heart Rate 78 78 94  Respiratory Rate 16 14 20  NISBP           110 105 125  NIDBP           72 50 79  NIMBP           85 69 92  SpO2                    98 98 100     General: Nontoxic-appearing   female laying in bed in no acute distress  HEENT: Sclerae nonicteric, EOMI, neck supple, no ulcers visualized in mouth, no evidence of dried blood in the mouth or nares bilaterally  Respiratory: Clear to auscultation bilaterally  Cardiac: RRR, no murmur  GI: Soft. no distenton, no tenderness, no rebound, no guarding, no rigidity  Extremities: No edema, moving all 4 extremities  CNS: Alert, oriented, sensation intact light touch grossly  Skin: no rash, no ulcers  Psychiatric: Cooperative, pleasant personality    Lines, Tubes, and Drains   Implanted Ports   Right, Subclavian accessed on 02/27/19 .      Labs between:  26-FEB-2019 18:44 to 27-FEB-2019 18:44    CBC:                 WBC  HgB  Hct  Plt  MCV  RDW   27-FEB-2019 7.4  (L) 7.1  (L) 22.1  (L) 82  (H) 102.8  14.4     DIFF:                 Seg  Neutroph//ABS  Lymph//ABS  Mono//ABS  EOS/ABS  27-FEB-2019 NOT APPLICABLE  89 // 6.5 8 // (L) 0.6  2 // (L) 0.2  0 // (L) 0.0     BMP:                 Na  Cl  BUN  Glu   27-FEB-2019 140  (H) 110  (H) 30  (H) 193                              K  CO2  Cr  Ca                              4.2  21  0.85  (L) 8.3     CMP:                 AST  ALT  AlkPhos  Bili  Albumin   27-FEB-2019 24  27  97  0.2  (L) 2.9     Other Chem:             Mg  Phos  Triglycerides  GGTP  DirectBili                           1.7             COAG:                 INR  PT  PTT  Ddimer  Fibrinogen    27-FEB-2019 1.0  10.2                             Assessment and Plan    64-year-old female with PMH of recurrent endometrial cancer s/p chemotherapy, DVT/PE on continued anticoagulation, presented to the ED today for black stools and subsequent drop in hemoglobin.  GI consulted for further evaluation.    #Acute blood loss anemia secondary likely to PUD/ulcers vs  epistaxis unlikely chemotherapy related  -Okay for clear liquid diets tonight, n.p.o. after midnight for endoscopy tomorrow morning  -Patient will need medical clearance/optimization prior  to procedure  -Continue to monitor hemodynamics closely  -Continue IV PPI twice daily at this time  -Transfuse for hb goal >7     #History of PE/DVT in the setting of malignancy - Hold Lovenox at this time in the setting of acute bleed  #Recurrent endometrial cancer receiving chemotherapy    Patient seen and discussed with attending Dr. Mcghee.  All questions answered with family present at the bedside.  Risks and benefits of endoscopy were explained to the patient at that time.  Patient agreeable with plan.  Attending physician spoke directly with primary oncologist.  Penny Medley, PGY-2  Internal Medicine   engorgement/mastitis

## 2022-10-12 NOTE — CONSULT NOTE ADULT - PROBLEM SELECTOR RECOMMENDATION 9
A/p: 37yo  POD#8 s/p primary c/s @ 39.4wks on 10/4/22 complicated by GDMA1 and gHTN (not requiring meds) presenting to ED with acute onset shortness of breath on admission noted to be tachypneic tachycardic and hypoxic requiring BIPAP, preeclampsia with severe features (pulmonary edema) noted to have uptrending LFT; likely cardiomyopathy r/o pulmonary embolism; now admitted to ICU for further management   - discussed with [facility] fellow Dr. Rain - recommend keppra 500mg 12hr x 24hrs and theraputic heparin, cont serial BP monitoring, if pt reaches severe bp for labetalol ivp per protocol, trend PIH labs (LFTs noted to be uptrending)   - cont bipap and wean per primary team   - follow up echo, cta chest   - for continuous monitoring   - s/p anxiolytic x 1, s/p lasix 20 x 1   - cont prenatal vitamin, iron  - recommend bowel regimen w PPI, senna, MOM prn   - breast pump   - will continue to follow closely   - rest of care per primary ICU team  -d/w Dr. Rose  - d/w dr. rain, [facility]   - d/w beatriz tellez attending

## 2022-10-12 NOTE — DISCHARGE NOTE PROVIDER - NSDCMRMEDTOKEN_GEN_ALL_CORE_FT
chlorhexidine 2% topical pad: Apply topically to affected area once a day  enoxaparin: 40 milligram(s) injectable once a day  furosemide 100 mg/100 mL-0.9% intravenous solution: 40 milliliter(s) intravenous 2 times a day  hydrALAZINE 10 mg oral tablet: 1 tab(s) orally every 8 hours  isosorbide dinitrate 10 mg oral tablet: 1 tab(s) orally 3 times a day  ondansetron 2 mg/mL injectable solution: injectable every 8 hours, As Needed  pantoprazole 40 mg intravenous injection: 40 milligram(s) intravenous once a day

## 2022-10-12 NOTE — PROGRESS NOTE ADULT - PROBLEM SELECTOR PLAN 3
continue management and recommendations as per ICU   awaiting cardiology consult and echocardiogram results  CT angio cancelled as low suspicion and pt not stable enough to leave floor for test, will continue close monitoring  s/p Lasix x1  hypokalemia supplemented  continue BIPAP/CPAP and oxygen supplementation  continue Keppra x24hrs for possible severe pre-eclampsia seizure prophylaxis  contnue VTE prophylaxis

## 2022-10-12 NOTE — H&P ADULT - HISTORY OF PRESENT ILLNESS
Patient is a 37 yo Female, from home, hx Gestational DM (diet controlled), Gestational HTN (did not require meds), recent LSCS 10/4/2022, Chronic anemia,  Patient is a 37 yo Female, from home, hx Gestational DM (diet controlled), Gestational HTN (did not require meds), recent LSCS 10/4/2022, Chronic anemia, presenting with gradual progressive shortness of breath and bilateral lower extremity swelling since returning home after LSCS. Pt's O2 saturation at home was recorded to be 80%, hence patient was brought to the ED. Pt currently on Bipap, reports no chest pain, fevers, abdominal pain, nausea, vomiting, dysuria. Pt only taking vitamins at home. Father and pt's sister at bedside.     In the ED, vitals were   Patient is a 37 yo Female, from home, hx Gestational DM (diet controlled), Gestational HTN (did not require meds), recent LSCS 10/4/2022, Chronic anemia, presenting with gradual progressive shortness of breath and bilateral lower extremity swelling since returning home after LSCS. Pt's O2 saturation at home was recorded to be 80%, hence patient was brought to the ED. Pt currently on Bipap, reports no chest pain, fevers, abdominal pain, nausea, vomiting, dysuria. Pt only taking vitamins at home. Father and pt's sister at bedside.     In the ED, vitals were  Temp 99.2, -130s, /86, RR 26, 92% RA> given tachypnea and tachycardia> transitioned to Bipap 12/7 RR 12, FiO2 100% saturating 99%  Xray chest pulm edema s/p 20 mg IV Lasix> 2500cc Carrion output, started on low dose Nitro drip Patient is a 37 yo Female, from home, hx Gestational DM (diet controlled), Gestational HTN (did not require meds), recent LSCS 10/4/2022, Chronic anemia, presenting with gradual progressive shortness of breath and bilateral lower extremity swelling since returning home after LSCS. Pt's O2 saturation at home was recorded to be 80%, hence patient was brought to the ED. Pt currently on Bipap, reports no chest pain, fevers, abdominal pain, nausea, vomiting, dysuria. Pt only taking vitamins at home. Father and pt's sister at bedside.     In the ED, vitals were  Temp 99.2, -130s, /86, RR 26, 92% RA> given tachypnea and tachycardia> transitioned to Bipap 12/7 RR 12, FiO2 100% saturating 99%  Xray chest pulm edema s/p 20 mg IV Lasix x 2 doses> 2500cc Carrion output, started on low dose Nitro drip, titrate to

## 2022-10-12 NOTE — PROGRESS NOTE ADULT - PROBLEM SELECTOR PLAN 1
continue management and recommendations as per ICU   awaiting cardiology consult and echocardiogram results  CT angio cancelled as low suspicion and pt not stable enough to leave floor for test, will continue close monitoring  s/p Lasix x1  hypokalemia supplemented  continue BIPAP/CPAP and oxygen supplementation  continue Keppra x24hrs for possible severe pre-eclampsia seizure prophylaxis  contnue VTE prophylaxis    d/w Dr. Rose

## 2022-10-12 NOTE — ACUTE INTERFACILITY TRANSFER NOTE - HOSPITAL COURSE
Patient is a 35 yo Female, from home, hx Gestational DM (diet controlled), Gestational HTN (did not require meds), recent LSCS 10/4/2022, Chronic anemia, presenting with gradual progressive shortness of breath and bilateral lower extremity swelling since returning home after LSCS. Pt's O2 saturation at home was recorded to be 80%, hence patient was brought to the ED. Pt currently on Bipap, reports no chest pain, fevers, abdominal pain, nausea, vomiting, dysuria. Pt only taking vitamins at home. Father and pt's sister at bedside.     In the ED, vitals were  Temp 99.2, -130s, /86, RR 26, 92% RA> given tachypnea and tachycardia> transitioned to Bipap 12/7 RR 12, FiO2 100% saturating 99%  Xray chest pulm edema s/p 20 mg IV Lasix x 2 doses> 2500cc Carrion output, started on low dose Nitro drip, titrate to     Echo read to have EF 15-20%, Grade III-IV diastolic dysfunction, severe global LV systolic dysfunction, w/Sinus tachycardia noted on EKG. Pt's nitro drip was d/c'ed 10/12 AM> transitioned to IV Lasix 40 mg BID, Isordil and Hydralazine  Pt now transitioned to nasal cannula, being transferred to [facility] CCU for continued care

## 2022-10-12 NOTE — PATIENT PROFILE ADULT - FALL HARM RISK - FACTORS
Dizziness/Impaired gait/IV and/or equipment tethered to patient/Medication side effects/Other medical problems/Poor balance/Post procedure

## 2022-10-12 NOTE — PATIENT PROFILE ADULT - MEDICATIONS/VISITS
----- Message from Janay Talavera DNP sent at 7/23/2019  2:54 PM CDT -----  Bone density showed osteoporosis. I noticed that fosamax was listed as a drug allergy so may need to talk with Dr. Nye for any other tx options. Recommend you take adequate Calcium 1000-1200mg daily and Vitamin D 800-1000 units daily. Repeat DEXA scan in 2 yrs.   no

## 2022-10-12 NOTE — PROGRESS NOTE ADULT - ATTENDING COMMENTS
Briefly, 37yo woman  w/ PMH gestational DM, gestational HTN and recent LSCS 10/4 who returned to hospital with increasing LE edema, and progressively worsening dyspnea after discharge from [facility]. Was admitted to ICU for acute hypoxemic respiratory failure requiring non-invasive ventilation with concern for peripartum cardiomyopathy.     Patient was seen and examined during bedside rounds and on several follow-up evaluations throughout the morning and afternoon today.     ASSESSMENT:  #Acute hypoxemic respiratory failure  #Peripartum cardiomyopathy  #Hypokalemia  #Anemia    Plan:  - continue non-invasive ventilation vs. 5-6L NC  - stat TTE, per gross bedside eval EF~20-30%  - lasix had to be held in the AM due to profound hypokalemia pending repletion, then additional 40 IV given early afternoon with good UOP response and symptomatic improvement  - cardiology eval appreciated  - stop nitro gtt, will eval for afterload reduction pending hemodynamics  - defer CTA PE study as suspicion for PE is lower given s/sx consistent with cardiomyopathy, and pt lacks stability to leave unit for CTA  - case was discussed with [facility] and their input is appreciated  - family and pt were updated with progress

## 2022-10-12 NOTE — CHART NOTE - NSCHARTNOTEFT_GEN_A_CORE
Informed by Gyn Service at [facility] that Dr Thorpe (MFM at [facility]) wants patient transferred to [facility] CCU (accepting physician, Dr Gill) for further management of her severe global cardiomyopathy (EF 15-20%). Called transfer center and connected with Dr Gill who confirms accepting the patient and patient agrees for transfer.

## 2022-10-12 NOTE — PROGRESS NOTE ADULT - NS ATTEND AMEND GEN_ALL_CORE FT
attending note (late entry)  patient was seen and examined at bedside (around 7:30pm). please see above for details. she was admitted last night w/ SOB, dx pp cardiomyopathy. bedside echo today showing ef of 15-20%. received total of 60mg IV lasix so far (20mg x3).   at bedside, pt reports SOB much improved since being admitted to hospital. pt is awake on 6L of NC. A&Ox3. appears to be short of breath.  C/S incision appears c/d/i. pain is appropriate. no distension. no rebound or guarding. b/l LE w/ edema.   pt care/management plan was d/w MFM Dr. Thorpe and ICU attending. decision made for transfer to CCU at [facility].   ICU to call for transfer.   plan d/w patient. pt verbalized understanding of plan.   Wolf BURGESS

## 2022-10-12 NOTE — PROGRESS NOTE ADULT - ASSESSMENT
35yo female readmission HD#1, POD#8 s/p primary elective CS; current admission for supected peripartum cardiomyopathy and acute respiratory failure

## 2022-10-12 NOTE — PROGRESS NOTE ADULT - SUBJECTIVE AND OBJECTIVE BOX
Pt seen resting at bedside, arousable but evidently sluggish and weak.   and father at bedside for update.  No acute events since admission.  Breast pump at bedside and lactation consultation done this morning.  No further complaints of chest pains, SOB, vomiting, headache, palpitations, or any other issues.  Carrion in place. NPO.  Currently on BIPAP .    ICU Vital Signs Last 24 Hrs  T(C): 36.2 (12 Oct 2022 16:13), Max: 37.7 (12 Oct 2022 06:45)  T(F): 97.2 (12 Oct 2022 16:13), Max: 99.9 (12 Oct 2022 07:45)  HR: 128 (12 Oct 2022 14:00) (98 - 141)  BP: 132/93 (12 Oct 2022 14:00) (118/91 - 166/99)  BP(mean): 101 (12 Oct 2022 14:00) (94 - 112)  RR: 28 (12 Oct 2022 14:00) (14 - 36)  SpO2: 97% (12 Oct 2022 14:00) (88% - 100%)    O2 Parameters below as of 12 Oct 2022 14:00  Patient On (Oxygen Delivery Method): BiPAP/CPAP    PE: resting comfortable, AAOx3 in NAD  Abd: soft, NT/ND; incision c/d/i; no guarding or rebound; +BS  Ext: soft, NT, 2+ nonpitting edema bilaterally  Gyn: no vaginal bleeding    Labs:                        10.4   11.66 )-----------( 325      ( 12 Oct 2022 03:58 )             32.6     10-12    144  |  110<H>  |  9   ----------------------------<  91  3.7   |  26  |  0.49<L>    Ca    8.0<L>      12 Oct 2022 11:00  Phos  2.6     10-12  Mg     2.0     10-12    TPro  6.4  /  Alb  2.9<L>  /  TBili  0.3  /  DBili  x   /  AST  27  /  ALT  55  /  AlkPhos  129<H>  10-12    Diagnostic imaging pending

## 2022-10-12 NOTE — CHART NOTE - NSCHARTNOTEFT_GEN_A_CORE
Spoke to care team in regards to patient ARLENE early this morning.    In short, patient is now POD#9 s/p primary CD at term. Pregnancy complicated by gestational hypertension and gestational diabetes. She presented with acute onset of shortness of breath. She was hypoxic, tachypnic and tachycardic. Evidence of pulmonary edema on CXR and Lung US. She has elevated BNP and elevated troponin x 3. Ddx includes peripartum cardiomyopathy, acute MI with cardiac dysfunction, pulmonary embolism, preeclampsia. Patient was stabilized from respiratory standpoint on BiPap. INitially was not stable enough per inpatient team for chest imaging. Initially with severe range blood pressure, lower suspicion for preeclampsia however rec seizure prophylaxis with Keppra. Avoid magnesium given pulmonary edema.     Recommend cardiology consultation with urgent cardiac imaging, chest CT to rule out pulmonary embolism. Strict i'o and o's.    This was discussed with OB PA early this morning. Offered transfer of care to [facility], per PA patient stable in ICU and not requiring transfer.     Discussed with Dr. Ila Stephen MD  Fellow, Maternal Fetal Medicine

## 2022-10-12 NOTE — DISCHARGE NOTE PROVIDER - HOSPITAL COURSE
Patient is a 37 yo Female, from home, hx Gestational DM (diet controlled), Gestational HTN (did not require meds), recent LSCS 10/4/2022, Chronic anemia, presenting with gradual progressive shortness of breath and bilateral lower extremity swelling since returning home after LSCS. Pt's O2 saturation at home was recorded to be 80%, hence patient was brought to the ED. Pt currently on Bipap, reports no chest pain, fevers, abdominal pain, nausea, vomiting, dysuria. Pt only taking vitamins at home. Father and pt's sister at bedside.     In the ED, vitals were  Temp 99.2, -130s, /86, RR 26, 92% RA> given tachypnea and tachycardia> transitioned to Bipap 12/7 RR 12, FiO2 100% saturating 99%  Xray chest pulm edema s/p 20 mg IV Lasix x 2 doses> 2500cc Carrion output, started on low dose Nitro drip, titrate to     Echo read to have EF 15-20%, Grade III-IV diastolic dysfunction, severe global LV systolic dysfunction, w/Sinus tachycardia noted on EKG. Pt's nitro drip was d/c'ed 10/12 AM> transitioned to IV Lasix 40 mg BID, Isordil and Hydralazine  Pt now transitioned to nasal cannula, being transferred to [facility] CCU for continued care Patient is a 37 yo Female, from home, hx Gestational DM (diet controlled), Gestational HTN (did not require meds), recent LSCS 10/4/2022, Chronic anemia, presenting with gradual progressive shortness of breath and bilateral lower extremity swelling since returning home after LSCS. Pt's O2 saturation at home was recorded to be 80%, hence patient was brought to the ED. Pt currently on Bipap, reports no chest pain, fevers, abdominal pain, nausea, vomiting, dysuria. Pt only taking vitamins at home. Father and pt's sister at bedside.     In the ED, vitals were  Temp 99.2, -130s, /86, RR 26, 92% RA> given tachypnea and tachycardia> transitioned to Bipap 12/7 RR 12, FiO2 100% saturating 99%  Xray chest pulm edema s/p 20 mg IV Lasix x 2 doses> 2500cc Carrion output, started on low dose Nitro drip, titrate to     Echo read to have EF 15-20%, Grade III-IV diastolic dysfunction, severe global LV systolic dysfunction, w/Sinus tachycardia noted on EKG. Pt's nitro drip was d/c'ed 10/12 AM> transitioned to IV Lasix 40 mg BID, Isordil and Hydralazine  Pt now transitioned to nasal cannula, being transferred to [facility] CCU for continued care

## 2022-10-13 ENCOUNTER — INPATIENT (INPATIENT)
Facility: HOSPITAL | Age: 37
LOS: 2 days | Discharge: HOME | DRG: 776 | End: 2022-10-16
Attending: HOSPITALIST | Admitting: INTERNAL MEDICINE
Payer: MEDICAID

## 2022-10-13 VITALS
HEART RATE: 127 BPM | RESPIRATION RATE: 20 BRPM | DIASTOLIC BLOOD PRESSURE: 86 MMHG | SYSTOLIC BLOOD PRESSURE: 130 MMHG | OXYGEN SATURATION: 99 %

## 2022-10-13 VITALS
HEIGHT: 62 IN | OXYGEN SATURATION: 91 % | DIASTOLIC BLOOD PRESSURE: 92 MMHG | HEART RATE: 140 BPM | TEMPERATURE: 98 F | SYSTOLIC BLOOD PRESSURE: 143 MMHG | RESPIRATION RATE: 32 BRPM | WEIGHT: 135.36 LBS

## 2022-10-13 DIAGNOSIS — R00.0 TACHYCARDIA, UNSPECIFIED: ICD-10-CM

## 2022-10-13 DIAGNOSIS — I10 ESSENTIAL (PRIMARY) HYPERTENSION: ICD-10-CM

## 2022-10-13 DIAGNOSIS — Z29.9 ENCOUNTER FOR PROPHYLACTIC MEASURES, UNSPECIFIED: ICD-10-CM

## 2022-10-13 DIAGNOSIS — I50.9 HEART FAILURE, UNSPECIFIED: ICD-10-CM

## 2022-10-13 DIAGNOSIS — J81.0 ACUTE PULMONARY EDEMA: ICD-10-CM

## 2022-10-13 DIAGNOSIS — R09.89 OTHER SPECIFIED SYMPTOMS AND SIGNS INVOLVING THE CIRCULATORY AND RESPIRATORY SYSTEMS: ICD-10-CM

## 2022-10-13 DIAGNOSIS — Z98.891 HISTORY OF UTERINE SCAR FROM PREVIOUS SURGERY: Chronic | ICD-10-CM

## 2022-10-13 PROBLEM — O24.419 GESTATIONAL DIABETES MELLITUS IN PREGNANCY, UNSPECIFIED CONTROL: Chronic | Status: ACTIVE | Noted: 2022-10-12

## 2022-10-13 PROBLEM — O13.9 GESTATIONAL [PREGNANCY-INDUCED] HYPERTENSION WITHOUT SIGNIFICANT PROTEINURIA, UNSPECIFIED TRIMESTER: Chronic | Status: ACTIVE | Noted: 2022-10-12

## 2022-10-13 LAB
A1C WITH ESTIMATED AVERAGE GLUCOSE RESULT: 4.6 % — SIGNIFICANT CHANGE UP (ref 4–5.6)
ALBUMIN SERPL ELPH-MCNC: 3.2 G/DL — LOW (ref 3.3–5)
ALP SERPL-CCNC: 122 U/L — HIGH (ref 40–120)
ALT FLD-CCNC: 36 U/L — SIGNIFICANT CHANGE UP (ref 10–45)
ANION GAP SERPL CALC-SCNC: 14 MMOL/L — SIGNIFICANT CHANGE UP (ref 5–17)
ANION GAP SERPL CALC-SCNC: 14 MMOL/L — SIGNIFICANT CHANGE UP (ref 5–17)
AST SERPL-CCNC: 31 U/L — SIGNIFICANT CHANGE UP (ref 10–40)
BASOPHILS # BLD AUTO: 0.05 K/UL — SIGNIFICANT CHANGE UP (ref 0–0.2)
BASOPHILS NFR BLD AUTO: 0.5 % — SIGNIFICANT CHANGE UP (ref 0–2)
BILIRUB SERPL-MCNC: 0.3 MG/DL — SIGNIFICANT CHANGE UP (ref 0.2–1.2)
BLD GP AB SCN SERPL QL: NEGATIVE — SIGNIFICANT CHANGE UP
BUN SERPL-MCNC: 14 MG/DL — SIGNIFICANT CHANGE UP (ref 7–23)
BUN SERPL-MCNC: 18 MG/DL — SIGNIFICANT CHANGE UP (ref 7–23)
CALCIUM SERPL-MCNC: 8.1 MG/DL — LOW (ref 8.4–10.5)
CALCIUM SERPL-MCNC: 8.1 MG/DL — LOW (ref 8.4–10.5)
CHLORIDE SERPL-SCNC: 103 MMOL/L — SIGNIFICANT CHANGE UP (ref 96–108)
CHLORIDE SERPL-SCNC: 108 MMOL/L — SIGNIFICANT CHANGE UP (ref 96–108)
CHOLEST SERPL-MCNC: 187 MG/DL — SIGNIFICANT CHANGE UP
CO2 SERPL-SCNC: 21 MMOL/L — LOW (ref 22–31)
CO2 SERPL-SCNC: 25 MMOL/L — SIGNIFICANT CHANGE UP (ref 22–31)
CREAT SERPL-MCNC: 0.6 MG/DL — SIGNIFICANT CHANGE UP (ref 0.5–1.3)
CREAT SERPL-MCNC: 0.7 MG/DL — SIGNIFICANT CHANGE UP (ref 0.5–1.3)
CULTURE RESULTS: NO GROWTH — SIGNIFICANT CHANGE UP
EGFR: 116 ML/MIN/1.73M2 — SIGNIFICANT CHANGE UP
EGFR: 120 ML/MIN/1.73M2 — SIGNIFICANT CHANGE UP
EOSINOPHIL # BLD AUTO: 0.1 K/UL — SIGNIFICANT CHANGE UP (ref 0–0.5)
EOSINOPHIL NFR BLD AUTO: 1 % — SIGNIFICANT CHANGE UP (ref 0–6)
ESTIMATED AVERAGE GLUCOSE: 85 MG/DL — SIGNIFICANT CHANGE UP (ref 68–114)
GLUCOSE SERPL-MCNC: 82 MG/DL — SIGNIFICANT CHANGE UP (ref 70–99)
GLUCOSE SERPL-MCNC: 85 MG/DL — SIGNIFICANT CHANGE UP (ref 70–99)
HCT VFR BLD CALC: 32.7 % — LOW (ref 34.5–45)
HDLC SERPL-MCNC: 55 MG/DL — SIGNIFICANT CHANGE UP
HGB BLD-MCNC: 10.1 G/DL — LOW (ref 11.5–15.5)
IMM GRANULOCYTES NFR BLD AUTO: 0.5 % — SIGNIFICANT CHANGE UP (ref 0–0.9)
LACTATE SERPL-SCNC: 1.4 MMOL/L — SIGNIFICANT CHANGE UP (ref 0.5–2)
LIPID PNL WITH DIRECT LDL SERPL: 109 MG/DL — HIGH
LYMPHOCYTES # BLD AUTO: 1.87 K/UL — SIGNIFICANT CHANGE UP (ref 1–3.3)
LYMPHOCYTES # BLD AUTO: 19.6 % — SIGNIFICANT CHANGE UP (ref 13–44)
MAGNESIUM SERPL-MCNC: 1.9 MG/DL — SIGNIFICANT CHANGE UP (ref 1.6–2.6)
MAGNESIUM SERPL-MCNC: 2 MG/DL — SIGNIFICANT CHANGE UP (ref 1.6–2.6)
MCHC RBC-ENTMCNC: 29.2 PG — SIGNIFICANT CHANGE UP (ref 27–34)
MCHC RBC-ENTMCNC: 30.9 GM/DL — LOW (ref 32–36)
MCV RBC AUTO: 94.5 FL — SIGNIFICANT CHANGE UP (ref 80–100)
MONOCYTES # BLD AUTO: 0.91 K/UL — HIGH (ref 0–0.9)
MONOCYTES NFR BLD AUTO: 9.5 % — SIGNIFICANT CHANGE UP (ref 2–14)
NEUTROPHILS # BLD AUTO: 6.57 K/UL — SIGNIFICANT CHANGE UP (ref 1.8–7.4)
NEUTROPHILS NFR BLD AUTO: 68.9 % — SIGNIFICANT CHANGE UP (ref 43–77)
NON HDL CHOLESTEROL: 132 MG/DL — HIGH
NRBC # BLD: 0 /100 WBCS — SIGNIFICANT CHANGE UP (ref 0–0)
PHOSPHATE SERPL-MCNC: 2.7 MG/DL — SIGNIFICANT CHANGE UP (ref 2.5–4.5)
PLATELET # BLD AUTO: 256 K/UL — SIGNIFICANT CHANGE UP (ref 150–400)
POTASSIUM SERPL-MCNC: 3.4 MMOL/L — LOW (ref 3.5–5.3)
POTASSIUM SERPL-MCNC: 4.2 MMOL/L — SIGNIFICANT CHANGE UP (ref 3.5–5.3)
POTASSIUM SERPL-SCNC: 3.4 MMOL/L — LOW (ref 3.5–5.3)
POTASSIUM SERPL-SCNC: 4.2 MMOL/L — SIGNIFICANT CHANGE UP (ref 3.5–5.3)
PROT SERPL-MCNC: 6.1 G/DL — SIGNIFICANT CHANGE UP (ref 6–8.3)
RBC # BLD: 3.46 M/UL — LOW (ref 3.8–5.2)
RBC # FLD: 15.9 % — HIGH (ref 10.3–14.5)
RH IG SCN BLD-IMP: POSITIVE — SIGNIFICANT CHANGE UP
SODIUM SERPL-SCNC: 142 MMOL/L — SIGNIFICANT CHANGE UP (ref 135–145)
SODIUM SERPL-SCNC: 143 MMOL/L — SIGNIFICANT CHANGE UP (ref 135–145)
SPECIMEN SOURCE: SIGNIFICANT CHANGE UP
TRIGL SERPL-MCNC: 113 MG/DL — SIGNIFICANT CHANGE UP
TROPONIN T, HIGH SENSITIVITY RESULT: 59 NG/L — HIGH (ref 0–51)
TROPONIN T, HIGH SENSITIVITY RESULT: 86 NG/L — HIGH (ref 0–51)
TSH SERPL-MCNC: 1.31 UIU/ML — SIGNIFICANT CHANGE UP (ref 0.27–4.2)
WBC # BLD: 9.55 K/UL — SIGNIFICANT CHANGE UP (ref 3.8–10.5)
WBC # FLD AUTO: 9.55 K/UL — SIGNIFICANT CHANGE UP (ref 3.8–10.5)

## 2022-10-13 PROCEDURE — 87086 URINE CULTURE/COLONY COUNT: CPT

## 2022-10-13 PROCEDURE — 84484 ASSAY OF TROPONIN QUANT: CPT

## 2022-10-13 PROCEDURE — 87641 MR-STAPH DNA AMP PROBE: CPT

## 2022-10-13 PROCEDURE — 36415 COLL VENOUS BLD VENIPUNCTURE: CPT

## 2022-10-13 PROCEDURE — 83880 ASSAY OF NATRIURETIC PEPTIDE: CPT

## 2022-10-13 PROCEDURE — 86901 BLOOD TYPING SEROLOGIC RH(D): CPT

## 2022-10-13 PROCEDURE — 99221 1ST HOSP IP/OBS SF/LOW 40: CPT

## 2022-10-13 PROCEDURE — 83550 IRON BINDING TEST: CPT

## 2022-10-13 PROCEDURE — 96376 TX/PRO/DX INJ SAME DRUG ADON: CPT

## 2022-10-13 PROCEDURE — 71045 X-RAY EXAM CHEST 1 VIEW: CPT

## 2022-10-13 PROCEDURE — 99291 CRITICAL CARE FIRST HOUR: CPT

## 2022-10-13 PROCEDURE — 96375 TX/PRO/DX INJ NEW DRUG ADDON: CPT

## 2022-10-13 PROCEDURE — 80048 BASIC METABOLIC PNL TOTAL CA: CPT

## 2022-10-13 PROCEDURE — 93306 TTE W/DOPPLER COMPLETE: CPT

## 2022-10-13 PROCEDURE — 82728 ASSAY OF FERRITIN: CPT

## 2022-10-13 PROCEDURE — 99223 1ST HOSP IP/OBS HIGH 75: CPT

## 2022-10-13 PROCEDURE — 83615 LACTATE (LD) (LDH) ENZYME: CPT

## 2022-10-13 PROCEDURE — 85730 THROMBOPLASTIN TIME PARTIAL: CPT

## 2022-10-13 PROCEDURE — 82803 BLOOD GASES ANY COMBINATION: CPT

## 2022-10-13 PROCEDURE — 87635 SARS-COV-2 COVID-19 AMP PRB: CPT

## 2022-10-13 PROCEDURE — 85610 PROTHROMBIN TIME: CPT

## 2022-10-13 PROCEDURE — 87640 STAPH A DNA AMP PROBE: CPT

## 2022-10-13 PROCEDURE — 93010 ELECTROCARDIOGRAM REPORT: CPT

## 2022-10-13 PROCEDURE — 84100 ASSAY OF PHOSPHORUS: CPT

## 2022-10-13 PROCEDURE — 86850 RBC ANTIBODY SCREEN: CPT

## 2022-10-13 PROCEDURE — 99233 SBSQ HOSP IP/OBS HIGH 50: CPT | Mod: 25

## 2022-10-13 PROCEDURE — 80053 COMPREHEN METABOLIC PANEL: CPT

## 2022-10-13 PROCEDURE — 83735 ASSAY OF MAGNESIUM: CPT

## 2022-10-13 PROCEDURE — 94660 CPAP INITIATION&MGMT: CPT

## 2022-10-13 PROCEDURE — 84145 PROCALCITONIN (PCT): CPT

## 2022-10-13 PROCEDURE — 81001 URINALYSIS AUTO W/SCOPE: CPT

## 2022-10-13 PROCEDURE — 84702 CHORIONIC GONADOTROPIN TEST: CPT

## 2022-10-13 PROCEDURE — 86900 BLOOD TYPING SEROLOGIC ABO: CPT

## 2022-10-13 PROCEDURE — 84156 ASSAY OF PROTEIN URINE: CPT

## 2022-10-13 PROCEDURE — 83605 ASSAY OF LACTIC ACID: CPT

## 2022-10-13 PROCEDURE — 82570 ASSAY OF URINE CREATININE: CPT

## 2022-10-13 PROCEDURE — 96374 THER/PROPH/DIAG INJ IV PUSH: CPT

## 2022-10-13 PROCEDURE — 93005 ELECTROCARDIOGRAM TRACING: CPT

## 2022-10-13 PROCEDURE — 71045 X-RAY EXAM CHEST 1 VIEW: CPT | Mod: 26

## 2022-10-13 PROCEDURE — 85025 COMPLETE CBC W/AUTO DIFF WBC: CPT

## 2022-10-13 PROCEDURE — 99233 SBSQ HOSP IP/OBS HIGH 50: CPT | Mod: GC

## 2022-10-13 PROCEDURE — 85027 COMPLETE CBC AUTOMATED: CPT

## 2022-10-13 PROCEDURE — 93010 ELECTROCARDIOGRAM REPORT: CPT | Mod: 77

## 2022-10-13 PROCEDURE — 84550 ASSAY OF BLOOD/URIC ACID: CPT

## 2022-10-13 PROCEDURE — 85384 FIBRINOGEN ACTIVITY: CPT

## 2022-10-13 PROCEDURE — 83540 ASSAY OF IRON: CPT

## 2022-10-13 PROCEDURE — 87040 BLOOD CULTURE FOR BACTERIA: CPT

## 2022-10-13 RX ORDER — LEVETIRACETAM 250 MG/1
500 TABLET, FILM COATED ORAL
Refills: 0 | Status: DISCONTINUED | OUTPATIENT
Start: 2022-10-13 | End: 2022-10-13

## 2022-10-13 RX ORDER — FUROSEMIDE 40 MG
40 TABLET ORAL ONCE
Refills: 0 | Status: COMPLETED | OUTPATIENT
Start: 2022-10-13 | End: 2022-10-13

## 2022-10-13 RX ORDER — PANTOPRAZOLE SODIUM 20 MG/1
40 TABLET, DELAYED RELEASE ORAL
Refills: 0 | Status: DISCONTINUED | OUTPATIENT
Start: 2022-10-13 | End: 2022-10-13

## 2022-10-13 RX ORDER — ISOSORBIDE DINITRATE 5 MG/1
10 TABLET ORAL THREE TIMES A DAY
Refills: 0 | Status: DISCONTINUED | OUTPATIENT
Start: 2022-10-13 | End: 2022-10-13

## 2022-10-13 RX ORDER — FERROUS SULFATE 325(65) MG
325 TABLET ORAL DAILY
Refills: 0 | Status: DISCONTINUED | OUTPATIENT
Start: 2022-10-13 | End: 2022-10-16

## 2022-10-13 RX ORDER — LEVETIRACETAM 250 MG/1
500 TABLET, FILM COATED ORAL ONCE
Refills: 0 | Status: COMPLETED | OUTPATIENT
Start: 2022-10-13 | End: 2022-10-13

## 2022-10-13 RX ORDER — HYDRALAZINE HCL 50 MG
10 TABLET ORAL ONCE
Refills: 0 | Status: COMPLETED | OUTPATIENT
Start: 2022-10-13 | End: 2022-10-13

## 2022-10-13 RX ORDER — POTASSIUM CHLORIDE 20 MEQ
40 PACKET (EA) ORAL EVERY 4 HOURS
Refills: 0 | Status: COMPLETED | OUTPATIENT
Start: 2022-10-13 | End: 2022-10-14

## 2022-10-13 RX ORDER — LOSARTAN POTASSIUM 100 MG/1
25 TABLET, FILM COATED ORAL DAILY
Refills: 0 | Status: DISCONTINUED | OUTPATIENT
Start: 2022-10-14 | End: 2022-10-14

## 2022-10-13 RX ORDER — SENNA PLUS 8.6 MG/1
2 TABLET ORAL AT BEDTIME
Refills: 0 | Status: DISCONTINUED | OUTPATIENT
Start: 2022-10-13 | End: 2022-10-16

## 2022-10-13 RX ORDER — PANTOPRAZOLE SODIUM 20 MG/1
40 TABLET, DELAYED RELEASE ORAL
Refills: 0 | Status: DISCONTINUED | OUTPATIENT
Start: 2022-10-13 | End: 2022-10-16

## 2022-10-13 RX ORDER — HYDRALAZINE HCL 50 MG
20 TABLET ORAL THREE TIMES A DAY
Refills: 0 | Status: DISCONTINUED | OUTPATIENT
Start: 2022-10-13 | End: 2022-10-15

## 2022-10-13 RX ORDER — ACETAMINOPHEN 500 MG
1000 TABLET ORAL ONCE
Refills: 0 | Status: COMPLETED | OUTPATIENT
Start: 2022-10-13 | End: 2022-10-13

## 2022-10-13 RX ORDER — FUROSEMIDE 40 MG
40 TABLET ORAL
Refills: 0 | Status: DISCONTINUED | OUTPATIENT
Start: 2022-10-13 | End: 2022-10-14

## 2022-10-13 RX ORDER — MAGNESIUM SULFATE 500 MG/ML
1 VIAL (ML) INJECTION ONCE
Refills: 0 | Status: COMPLETED | OUTPATIENT
Start: 2022-10-13 | End: 2022-10-13

## 2022-10-13 RX ORDER — ENOXAPARIN SODIUM 100 MG/ML
40 INJECTION SUBCUTANEOUS EVERY 24 HOURS
Refills: 0 | Status: DISCONTINUED | OUTPATIENT
Start: 2022-10-13 | End: 2022-10-16

## 2022-10-13 RX ORDER — CHLORHEXIDINE GLUCONATE 213 G/1000ML
1 SOLUTION TOPICAL
Refills: 0 | Status: DISCONTINUED | OUTPATIENT
Start: 2022-10-13 | End: 2022-10-13

## 2022-10-13 RX ORDER — HYDRALAZINE HCL 50 MG
10 TABLET ORAL THREE TIMES A DAY
Refills: 0 | Status: DISCONTINUED | OUTPATIENT
Start: 2022-10-13 | End: 2022-10-13

## 2022-10-13 RX ORDER — ONDANSETRON 8 MG/1
4 TABLET, FILM COATED ORAL
Refills: 0 | Status: DISCONTINUED | OUTPATIENT
Start: 2022-10-13 | End: 2022-10-16

## 2022-10-13 RX ADMIN — Medication 1000 MILLIGRAM(S): at 03:10

## 2022-10-13 RX ADMIN — LEVETIRACETAM 500 MILLIGRAM(S): 250 TABLET, FILM COATED ORAL at 05:55

## 2022-10-13 RX ADMIN — Medication 400 MILLIGRAM(S): at 02:40

## 2022-10-13 RX ADMIN — Medication 40 MILLIGRAM(S): at 04:05

## 2022-10-13 RX ADMIN — Medication 20 MILLIGRAM(S): at 22:31

## 2022-10-13 RX ADMIN — CHLORHEXIDINE GLUCONATE 1 APPLICATION(S): 213 SOLUTION TOPICAL at 05:55

## 2022-10-13 RX ADMIN — Medication 20 MILLIGRAM(S): at 14:38

## 2022-10-13 RX ADMIN — ENOXAPARIN SODIUM 40 MILLIGRAM(S): 100 INJECTION SUBCUTANEOUS at 05:55

## 2022-10-13 RX ADMIN — Medication 10 MILLIGRAM(S): at 07:51

## 2022-10-13 RX ADMIN — Medication 40 MILLIEQUIVALENT(S): at 20:59

## 2022-10-13 RX ADMIN — SENNA PLUS 2 TABLET(S): 8.6 TABLET ORAL at 22:31

## 2022-10-13 RX ADMIN — Medication 40 MILLIGRAM(S): at 10:31

## 2022-10-13 RX ADMIN — Medication 10 MILLIGRAM(S): at 05:54

## 2022-10-13 RX ADMIN — PANTOPRAZOLE SODIUM 40 MILLIGRAM(S): 20 TABLET, DELAYED RELEASE ORAL at 05:54

## 2022-10-13 RX ADMIN — Medication 100 GRAM(S): at 04:06

## 2022-10-13 NOTE — PROGRESS NOTE ADULT - PROBLEM SELECTOR PLAN 2
EF 15-20% seen on echo on 10/12  GDMT by Cards OB (see above)  EP to see patient outpatient for eval of ICD for primary prevention of SCD; consider asking prior to d/c if she needs lifevest/WCD  expected improvement in 3 months likely PPCM i/s/o recent pregnancy  -r/o ICM; Trops 59; continue to trend; ischemic workup prior to d/c to r/o SCAD/ICM  EF 15-20% seen on echo on 10/12  GDMT by Cards OB (see above)  EP to see patient outpatient for eval of ICD for primary prevention of SCD; consider asking prior to d/c if she needs lifevest/WCD  expected improvement in 3 months

## 2022-10-13 NOTE — PROGRESS NOTE ADULT - SUBJECTIVE AND OBJECTIVE BOX
C A R D I O L O G Y  **********************************     DATE OF SERVICE: 10-13-22    Patient reports SOB improved, remains on nasal cannula. Denies chest pain.  Review of systems otherwise negative.  	  MEDICATIONS:  MEDICATIONS  (STANDING):  chlorhexidine 4% Liquid 1 Application(s) Topical <User Schedule>  enoxaparin Injectable 40 milliGRAM(s) SubCutaneous every 24 hours  hydrALAZINE 20 milliGRAM(s) Oral three times a day  pantoprazole    Tablet 40 milliGRAM(s) Oral before breakfast  senna 2 Tablet(s) Oral at bedtime      LABS:	 	    CARDIAC MARKERS:                                10.1   9.55  )-----------( 256      ( 13 Oct 2022 02:14 )             32.7     Hemoglobin: 10.1 g/dL (10-13 @ 02:14)  Hemoglobin: 10.4 g/dL (10-12 @ 03:58)  Hemoglobin: 10.9 g/dL (10-11 @ 23:20)      10-13    143  |  108  |  14  ----------------------------<  85  4.2   |  21<L>  |  0.60    Ca    8.1<L>      13 Oct 2022 03:00  Phos  2.7     10-13  Mg     1.9     10-13    TPro  6.1  /  Alb  3.2<L>  /  TBili  0.3  /  DBili  x   /  AST  31  /  ALT  36  /  AlkPhos  122<H>  10-13    Creatinine Trend: 0.60<--, 0.60<--, 0.49<--, 0.57<--, 0.61<--, 0.47<--    COAGS:       proBNP:   Lipid Profile:   HgA1c:   TSH: Thyroid Stimulating Hormone, Serum: 1.31 uIU/mL (10-13 @ 02:14)        PHYSICAL EXAM:  T(C): 36.8 (10-13-22 @ 12:00), Max: 37.5 (10-12-22 @ 13:30)  HR: 134 (10-13-22 @ 12:00) (106 - 141)  BP: 114/80 (10-13-22 @ 12:00) (114/80 - 144/99)  RR: 24 (10-13-22 @ 12:00) (15 - 36)  SpO2: 95% (10-13-22 @ 12:00) (83% - 99%)  Wt(kg): --  I&O's Summary    12 Oct 2022 07:01  -  13 Oct 2022 07:00  --------------------------------------------------------  IN: 540 mL / OUT: 925 mL / NET: -385 mL    13 Oct 2022 07:01  -  13 Oct 2022 12:37  --------------------------------------------------------  IN: 240 mL / OUT: 1055 mL / NET: -815 mL      Height (cm): 157.5 (10-13 @ 01:34)  Weight (kg): 61.4 (10-13 @ 01:34)  BMI (kg/m2): 24.8 (10-13 @ 01:34)  BSA (m2): 1.62 (10-13 @ 01:34)    Gen: NAD  HEENT:  (-)icterus (-)pallor  CV: N S1 S2 1/6 LEEANNE (+)2 Pulses B/l  Resp:  Clear to auscultation B/L, normal effort  GI: (+) BS Soft, NT, ND  Lymph:  (+) LE Edema, (-)obvious lymphadenopathy  Skin: Warm to touch, Normal turgor  Psych: Appropriate mood and affect      TELEMETRY: -120s	      < from: Transthoracic Echocardiogram (10.12.22 @ 08:02) >  Ejection Fraction Visual Estimate: 15-20 %  CONCLUSIONS:  1. Tethered mitral valve leaflets. Mild mitral  regurgitation.  2. Moderately dilated left atrium.  LA volume index = 42  cc/m2.  3. Normal left ventricular internal dimensions and wall  thicknesses.  4. Severe global left ventricular systolic dysfunction.  5. Grade III-IV  diastolic dysfunction(severe).  6. Normal right ventricular size and function.  7. Bilateral pleural effusions.    < end of copied text >      ASSESSMENT/PLAN: 36y Female  Gestational DM (diet controlled), Gestational HTN (did not require meds), recent LSCS 10/4/2022, Chronic anemia, presenting with gradual progressive shortness of breath and bilateral lower extremity swelling since returning home after LSCS found to have TTE with severe global LV dysfunction (EF 15-20%) admitted with peripartum cardiomyopathy.    - Transferred to [facility] CCU for further management  - Keep net negative with IV lasix prn, strict I/Os  - Wean O2 as tolerated  - Continue hydralazine for afterload reduction  - OB eval noted - isordil d/bang due to breastfeeding  - Supportive care per CCU appreciated    Jeff Garcia PA-C  Pager: [phone]   C A R D I O L O G Y  **********************************     DATE OF SERVICE: 10-13-22    Patient reports SOB improved, remains on nasal cannula. Denies chest pain.  Review of systems otherwise negative.  	  MEDICATIONS:  MEDICATIONS  (STANDING):  chlorhexidine 4% Liquid 1 Application(s) Topical <User Schedule>  enoxaparin Injectable 40 milliGRAM(s) SubCutaneous every 24 hours  hydrALAZINE 20 milliGRAM(s) Oral three times a day  pantoprazole    Tablet 40 milliGRAM(s) Oral before breakfast  senna 2 Tablet(s) Oral at bedtime      LABS:	 	    CARDIAC MARKERS:                                10.1   9.55  )-----------( 256      ( 13 Oct 2022 02:14 )             32.7     Hemoglobin: 10.1 g/dL (10-13 @ 02:14)  Hemoglobin: 10.4 g/dL (10-12 @ 03:58)  Hemoglobin: 10.9 g/dL (10-11 @ 23:20)      10-13    143  |  108  |  14  ----------------------------<  85  4.2   |  21<L>  |  0.60    Ca    8.1<L>      13 Oct 2022 03:00  Phos  2.7     10-13  Mg     1.9     10-13    TPro  6.1  /  Alb  3.2<L>  /  TBili  0.3  /  DBili  x   /  AST  31  /  ALT  36  /  AlkPhos  122<H>  10-13    Creatinine Trend: 0.60<--, 0.60<--, 0.49<--, 0.57<--, 0.61<--, 0.47<--    COAGS:       proBNP:   Lipid Profile:   HgA1c:   TSH: Thyroid Stimulating Hormone, Serum: 1.31 uIU/mL (10-13 @ 02:14)        PHYSICAL EXAM:  T(C): 36.8 (10-13-22 @ 12:00), Max: 37.5 (10-12-22 @ 13:30)  HR: 134 (10-13-22 @ 12:00) (106 - 141)  BP: 114/80 (10-13-22 @ 12:00) (114/80 - 144/99)  RR: 24 (10-13-22 @ 12:00) (15 - 36)  SpO2: 95% (10-13-22 @ 12:00) (83% - 99%)  Wt(kg): --  I&O's Summary    12 Oct 2022 07:01  -  13 Oct 2022 07:00  --------------------------------------------------------  IN: 540 mL / OUT: 925 mL / NET: -385 mL    13 Oct 2022 07:01  -  13 Oct 2022 12:37  --------------------------------------------------------  IN: 240 mL / OUT: 1055 mL / NET: -815 mL      Height (cm): 157.5 (10-13 @ 01:34)  Weight (kg): 61.4 (10-13 @ 01:34)  BMI (kg/m2): 24.8 (10-13 @ 01:34)  BSA (m2): 1.62 (10-13 @ 01:34)    Gen: NAD  HEENT:  (-)icterus (-)pallor  CV: N S1 S2 1/6 LEEANNE (+)2 Pulses B/l  Resp:  Clear to auscultation B/L, normal effort  GI: (+) BS Soft, NT, ND  Lymph:  (+) LE Edema, (-)obvious lymphadenopathy  Skin: Warm to touch, Normal turgor  Psych: Appropriate mood and affect      TELEMETRY: -120s	      < from: Transthoracic Echocardiogram (10.12.22 @ 08:02) >  Ejection Fraction Visual Estimate: 15-20 %  CONCLUSIONS:  1. Tethered mitral valve leaflets. Mild mitral  regurgitation.  2. Moderately dilated left atrium.  LA volume index = 42  cc/m2.  3. Normal left ventricular internal dimensions and wall  thicknesses.  4. Severe global left ventricular systolic dysfunction.  5. Grade III-IV  diastolic dysfunction(severe).  6. Normal right ventricular size and function.  7. Bilateral pleural effusions.    < end of copied text >      ASSESSMENT/PLAN: 36y Female  Gestational DM (diet controlled), Gestational HTN (did not require meds), recent LSCS 10/4/2022, Chronic anemia, presenting with gradual progressive shortness of breath and bilateral lower extremity swelling since returning home after LSCS found to have TTE with severe global LV dysfunction (EF 15-20%) admitted with peripartum cardiomyopathy.    - Transferred to [facility] CCU for further management  - Keep net negative with IV lasix prn, strict I/Os  - Wean O2 as tolerated  - Continue hydralazine for afterload reduction  - OB eval noted - isordil d/bang due to breastfeeding per OB  - Supportive care per CCU appreciated    Jeff Garcia PA-C  Pager: [phone]

## 2022-10-13 NOTE — PROGRESS NOTE ADULT - ATTENDING COMMENTS
This is a 37 y/o F PMH Gestational DM (diet controlled) and gestational HTN (not on meds) presented from [facility] for further management of SOB consistent with postpartum cardiomyopathy ( EF 15-20%). The patient was admitted to CICU and stabilized, now transferred to medicine service on Tele.     1. Acute systolic HF, likely postpartum cardiomyopathy ( EF 15-20%)  2. Acute Hypoxic respiratory failure  3. HTN  4. T2DM, diet controlled  5. s/p C- section 10/4/22    - had  section on 10/4/22, now c/o SOB, LE edema   - Trops ~ 59; EKG no ischemic changes  - appreciated Cardiology, HF team and EP f/u plans- will c/w Lasix 40mg IV q12hrs, Losartan and hydralazine ( part of GDMT). may need Entresto and BB later ( patient wants to breast feed) once Euvolemic  - Daily I/o, weight  - O2 sat in RA and on ambulation off O2  - Echo as outpatient in 12 weeks.  - OBGyn plannoted, breast pumpo every 3 hrs  - DVT ppx  ** spoke to father at bedside This is a 35 y/o F PMH Gestational DM (diet controlled) and gestational HTN (not on meds) presented from [facility] for further management of SOB consistent with postpartum cardiomyopathy ( EF 15-20%). The patient was admitted to CICU and stabilized, now transferred to medicine service on Tele.     1. Acute systolic HF, likely postpartum cardiomyopathy ( EF 15-20%)  2. Acute Hypoxic respiratory failure  3. HTN  4. T2DM, diet controlled  5. s/p C- section 10/4/22    - had  section on 10/4/22, now c/o SOB, LE edema   - Trops ~ 59; EKG no ischemic changes  - appreciated Cardiology, HF team and EP f/u plans- will c/w Lasix 40mg IV q12hrs, Losartan and hydralazine ( part of GDMT). may need Entresto and BB later ( patient wants to breast feed) once Euvolemic  - Daily I/o, weight  - O2 sat in RA and on ambulation off O2  - Echo as outpatient in 12 weeks.  - OBGyn plan noted, breast pump every 3 hrs  - DVT ppx  ** spoke to father at bedside

## 2022-10-13 NOTE — PROGRESS NOTE ADULT - ASSESSMENT
37 y/o F PMH Gestational DM (diet controlled) and gestational HTN (not on meds) presented from [facility] for further management of Postpartum cardiomyopathy (EF 15%) with volume overload s/p IV Lasix 40 x2. Respiratory status improving, now on 2 L NC. Cardiology, OB Cardiology, EP following.  37 y/o F PMH Gestational DM (diet controlled) and gestational HTN (not on meds) presented from [facility] for further management of ADHF (EF 15%) of unknown etiology most c/f Postpartum cardiomyopathy with volume overload s/p IV Lasix 40 x2. Respiratory status improving, now on 2 L NC. Cardiology, OB Cardiology, EP following.

## 2022-10-13 NOTE — H&P ADULT - HISTORY OF PRESENT ILLNESS
Patient is a 37 yo Female, hx Gestational DM (diet controlled), Gestational HTN (did not require meds), recent LSCS 10/4/2022, Chronic anemia, presenting with gradual progressive shortness of breath and bilateral lower extremity swelling since returning home after LSCS. Pt's O2 saturation at home was recorded to be 80%, hence patient was brought to the ED. Pt currently on Bipap, reports no chest pain, fevers, abdominal pain, nausea, vomiting, dysuria. Pt only taking vitamins at home.     In the ED, vitals were  Temp 99.2, -130s, /86, RR 26, 92% RA> given tachypnea and tachycardia> transitioned to Bipap 12/7 RR 12, FiO2 100% saturating 99%  Xray chest pulm edema s/p 20 mg IV Lasix x 2 doses> 2500cc Carrion output, started on low dose Nitro drip, titrate to     Pt was at [facility] where echo revealed acutely reduced EF 15-20%. Pt was diuresed with Lasix 20 x2 and weaned off Bipap. She has remained on 6L NC with acceptable O2. She was transferred to [facility] CICU for continued management.

## 2022-10-13 NOTE — CONSULT NOTE ADULT - SUBJECTIVE AND OBJECTIVE BOX
EP Attending    HISTORY OF PRESENT ILLNESS: HPI:  Patient is a 35 yo Female, hx Gestational DM (diet controlled), Gestational HTN (did not require meds), recent LSCS 10/4/2022, Chronic anemia, presenting with gradual progressive shortness of breath and bilateral lower extremity swelling since returning home after LSCS. Pt's O2 saturation at home was recorded to be 80%, hence patient was brought to the ED. Pt currently on Bipap, reports no chest pain, fevers, abdominal pain, nausea, vomiting, dysuria. Pt only taking vitamins at home.   In the ED, vitals were  Temp 99.2, -130s, /86, RR 26, 92% RA> given tachypnea and tachycardia> transitioned to Bipap  RR 12, FiO2 100% saturating 99%  Xray chest pulm edema s/p 20 mg IV Lasix x 2 doses> 2500cc Carrion output, started on low dose Nitro drip, titrate to   Pt was at [facility] where echo revealed acutely reduced EF 15-20%. Pt was diuresed with Lasix 20 x2 and weaned off Bipap. She has remained on 6L NC with acceptable O2. She was transferred to [facility] CICU for continued management.  (13 Oct 2022 02:08)    Date of service 10/13- resting in [facility] CICU, doing well with IV lasix and afterload reduction w/ hydralazine.  Besides being short of breath, no angina, no palpitations, no near-syncope.  No family history of similar cardiomyopathy.  A 10 pt ROS is otherwise negative.      PAST MEDICAL & SURGICAL HISTORY:  Gestational diabetes  Gestational hypertension  H/O  section    MEDICATIONS  (STANDING):  enoxaparin Injectable 40 milliGRAM(s) SubCutaneous every 24 hours  ferrous    sulfate 325 milliGRAM(s) Oral daily  hydrALAZINE 20 milliGRAM(s) Oral three times a day  senna 2 Tablet(s) Oral at bedtime    Allergies    No Known Allergies    Intolerances    FAMILY HISTORY:    Non-contributary for premature coronary disease or sudden cardiac death    SOCIAL HISTORY:    x ] Non-smoker  [ ] Smoker  [ ] Alcohol        PHYSICAL EXAM:  T(C): 36.8 (10-13-22 @ 12:00), Max: 37.5 (10-12-22 @ 13:30)  HR: 134 (10-13-22 @ 12:00) (106 - 141)  BP: 114/80 (10-13-22 @ 12:00) (114/80 - 144/99)  RR: 24 (10-13-22 @ 12:00) (15 - 36)  SpO2: 95% (10-13-22 @ 12:00) (83% - 99%)  Wt(kg): --    Appearance: Normal appearing young woman in no acute distress  HEENT:   Normal oral mucosa, PERRL, EOMI	  Lymphatic: No lymphadenopathy, ++LE edema  Cardiovascular: Rapid regular S1 S2, +JVD, No murmurs , Peripheral pulses palpable 2+ bilaterally  Respiratory: Lungs clear to auscultation, normal effort 	  Gastrointestinal:  Soft, Non-tender, + BS	  Skin: No rashes, No ecchymoses, No cyanosis, warm to touch  Musculoskeletal: Normal range of motion, normal strength  Psychiatry:  Mood & affect appropriate      TELEMETRY: sinus tachycardia	    ECG: sinus tachycardia, normal intervals.  Echo:  < from: Transthoracic Echocardiogram (10.12.22 @ 08:02) >  Dimensions:     Normal Values:  LA:     4.3 cm    2.0 - 4.0 cm  Ao:     2.6 cm    2.0 - 3.8 cm  SEPTUM: 0.8 cm    0.6 - 1.2 cm  PWT:    0.8 cm    0.6 - 1.1 cm  LVIDd:  5.6 cm    3.0 - 5.6 cm  LVIDs:  5.1 cm    1.8 - 4.0 cm      Derived Variables:  LVMI: 98 g/m2  RWT: 0.28  Ejection Fraction Visual Estimate: 15-20 %    ------------------------------------------------------------------------  OBSERVATIONS:  Mitral Valve: Tethered mitral valve leaflets. Mild mitral  regurgitation.  Aortic Root: Normal aortic root.  Aortic Valve: Aortic valve leaflet morphology not well  visualized, opens normally.  Left Atrium: Moderately dilated left atrium.  LA volume  index = 42 cc/m2.  Left Ventricle: Severe global left ventricular systolic  dysfunction. Normal left ventricular internal dimensions  and wall thicknesses. Grade III-IV  diastolic dysfunction  (severe).  Right Heart: Normal right atrium. Normal right ventricular  size and function. There is mild tricuspid regurgitation.  There is mild pulmonic regurgitation.  Pericardium/PleuraNormal pericardium with no pericardial  effusion. Bilateral pleural effusions.  Hemodynamic: Incomplete tricuspid regurgitation jet  precludes accurate assessment of pulmonary artery systolic  pressure.    < end of copied text >  	  LABS:	 	                          10.1   9.55  )-----------( 256      ( 13 Oct 2022 02:14 )             32.7     10-13    143  |  108  |  14  ----------------------------<  85  4.2   |  21<L>  |  0.60    Ca    8.1<L>      13 Oct 2022 03:00  Phos  2.7     10-13  Mg     1.9     10-13    TPro  6.1  /  Alb  3.2<L>  /  TBili  0.3  /  DBili  x   /  AST  31  /  ALT  36  /  AlkPhos  122<H>  10-13  TSH: Thyroid Stimulating Hormone, Serum: 1.31 uIU/mL (10-13 @ 02:14)      ASSESSMENT/PLAN: 	36y Female with acute systolic heart failure.  Is very recently post partum with  delivery, new orthopnea, LE edema, SOB.  Echo with severe LV systolic dysfunction.  LV dimensions dilated to upper limit of normal.  Valves look normal.  In all likelihood, a peripartum cardiomyopathy.  She intends to breast-feed and is pumping while in the CICU.  GDMT titration per cardiology and CICU team.  Would ask her to consider prioritizing ARB/Entresto and other meds over breast milk.  As she is very young with thus far no arrhythmia on telemetry, would give her longer than 3 months to recover her ejection fraction.  Outpatient evaluation for a primary prevention ICD, with serial echocardiograms.  Counselling and family planning, re: avoiding becoming pregnant again.      Stephen Fontenot M.D.  Cardiac Electrophysiology  office [phone]  pager [phone]
HPI:  Ms. Roberson is a 37 y/o F with history of asthma, gestational DM (diet controlled) and gestational (HTN, not requiring medication, outpatient BP generally 130/80s), mild COVID infection in 2022 who had an elective csection on 10/4 which was uncomplicated. She was discharged home on 10/6. She reports overall an uncomplicated pregnancy. She was walking 45 minutes a day 5 days a week throughout her pregnancy without CROOK. She did notice LE edema in the week preceding her delivery which she attributed to being toward the end of her pregnancy. Post discharge she reports feeling well for a few days and on the night of 10/11 she noted difficulty sleeping due to SOB and reports worsening LE edema. Her SOB persisted and home SpO2 reading was 80%. She presented to [facility] on 10/12. In the ED, vitals were Temp 99.2, -130s, /86, RR 26, 92% RA> given tachypnea and tachycardia> transitioned to Bipap  RR 12, FiO2 100% saturating 99%. Xray chest pulm edema s/p 20 mg IV Lasix x 2 doses> 2500cc Carrion output, started on low dose Nitro drip. Echo revealed EF 15-20%, LVIDd 5.6cm, nl RV size and function. She did not have a prior echocardiogram. She was weaned of bipap to 6L NC with acceptable O2. She was transferred to [facility] CICU for continued management.     She overall reports improvement in SOB and was able to walk from bed to chair without dyspnea. She states orthopnea has resolved. Her oxygen has been weaned to 2L. She reported lightheadedness upon getting OOB this morning. She received a dose of 40mg IV lasix 10/13 AM with ~900 UOP. She currently intends to breast feed and has been successfully pumping.       PAST MEDICAL & SURGICAL HISTORY:  Gestational diabetes  Gestational hypertension  H/O  section  asthma    Review of Systems:  14 point ROS done and found to be negative or noncontributory other than noted in HPI.    MEDICATIONS  (STANDING):  enoxaparin Injectable 40 milliGRAM(s) SubCutaneous every 24 hours  ferrous    sulfate 325 milliGRAM(s) Oral daily  hydrALAZINE 20 milliGRAM(s) Oral three times a day  senna 2 Tablet(s) Oral at bedtime    MEDICATIONS  (PRN):  ondansetron Injectable 4 milliGRAM(s) IV Push four times a day PRN Nausea and/or Vomiting  pantoprazole    Tablet 40 milliGRAM(s) Oral before breakfast PRN acid reflux      HOME MEDICATIONS:  iron  prenatal vitamin    Allergies    No Known Allergies    SOCIAL HISTORY:  no alcohol, tobacco, illicit drug use  , lives with     FAMILY HISTORY:  father with heart disease, uncertain of specifics    Vital Signs Last 24 Hrs  T(C): 36.5 (13 Oct 2022 14:00), Max: 37.1 (13 Oct 2022 06:00)  T(F): 97.7 (13 Oct 2022 14:00), Max: 98.7 (13 Oct 2022 06:00)  HR: 130 (13 Oct 2022 14:00) (106 - 140)  BP: 124/86 (13 Oct 2022 14:00) (114/80 - 144/99)  BP(mean): 94 (13 Oct 2022 13:00) (89 - 115)  RR: 19 (13 Oct 2022 14:00) (15 - 32)  SpO2: 95% (13 Oct 2022 14:00) (83% - 99%)    Parameters below as of 13 Oct 2022 14:00  Patient On (Oxygen Delivery Method): nasal cannula  O2 Flow (L/min): 2    Tele: -140s    General: No distress. Comfortable.  HEENT: EOM intact.  Neck: Neck supple. JVP 10-12cm H2O. No masses  Chest: Clear to auscultation bilaterally  CV: Normal S1 and S2. No murmurs, rub, or gallops. Radial pulses normal. +1 BLE edema  Abdomen: Soft, mildly distended, non-tender  Skin: No rashes or skin breakdown  Neurology: Alert and oriented times three. Sensation intact  Psych: Affect normal    LABS:                        10.1   9.55  )-----------( 256      ( 13 Oct 2022 02:14 )             32.7     10-13    143  |  108  |  14  ----------------------------<  85  4.2   |  21<L>  |  0.60    Ca    8.1<L>      13 Oct 2022 03:00  Phos  2.7     10-13  Mg     1.9     10-13    TPro  6.1  /  Alb  3.2<L>  /  TBili  0.3  /  DBili  x   /  AST  31  /  ALT  36  /  AlkPhos  122<H>  10-13    PT/INR - ( 11 Oct 2022 23:20 )   PT: 11.3 sec;   INR: 0.95 ratio         PTT - ( 11 Oct 2022 23:20 )  PTT:35.3 sec  Urinalysis Basic - ( 12 Oct 2022 01:32 )    Color: Yellow / Appearance: Clear / S.005 / pH: x  Gluc: x / Ketone: Negative  / Bili: Negative / Urobili: Negative   Blood: x / Protein: Negative / Nitrite: Negative   Leuk Esterase: Negative / RBC: 0-2 /HPF / WBC 0-2 /HPF   Sq Epi: x / Non Sq Epi: Few /HPF / Bacteria: Trace /HPF

## 2022-10-13 NOTE — PROGRESS NOTE ADULT - SUBJECTIVE AND OBJECTIVE BOX
OB Postpartum Note:  Delivery, POD#9    S: 37yo POD#9 s/p pLTCS at OSH, HD#1 a/w SOB and low O2 saturation, with findings consistent with postpartum cardiomyopathy. Overnight, the patient was weaned from 6L NC to 4L NC O2. She feels better than she did on initial presentation. Reports improvement in SOB, denies CP, palpitations, lightheadedness, dizziness, headache, vision changes. Pain is well controlled. She is tolerating a regular diet and passing flatus. She is ambulating without difficulty. Denies CP/SOB. Denies lightheadedness/dizziness. Denies N/V.    O:  Vitals:  Vital Signs Last 24 Hrs  T(C): 36.7 (13 Oct 2022 08:00), Max: 37.5 (12 Oct 2022 13:30)  T(F): 98.1 (13 Oct 2022 08:00), Max: 99.5 (12 Oct 2022 13:30)  HR: 124 (13 Oct 2022 11:00) (106 - 141)  BP: 133/88 (13 Oct 2022 11:00) (117/80 - 144/99)  BP(mean): 106 (13 Oct 2022 11:00) (89 - 115)  RR: 18 (13 Oct 2022 11:00) (15 - 36)  SpO2: 94% (13 Oct 2022 11:00) (83% - 99%)    Parameters below as of 13 Oct 2022 11:00  Patient On (Oxygen Delivery Method): nasal cannula  O2 Flow (L/min): 2      MEDICATIONS  (STANDING):  chlorhexidine 4% Liquid 1 Application(s) Topical <User Schedule>  enoxaparin Injectable 40 milliGRAM(s) SubCutaneous every 24 hours  hydrALAZINE 20 milliGRAM(s) Oral three times a day  pantoprazole    Tablet 40 milliGRAM(s) Oral before breakfast  senna 2 Tablet(s) Oral at bedtime    MEDICATIONS  (PRN):      LABS:  Blood type: O Positive  Rubella IgG: RPR: Negative                          10.1<L>   9.55 >-----------< 256    ( 10-13 @ 02:14 )             32.7<L>                        10.4<L>   11.66<H> >-----------< 325    ( 10-12 @ 03:58 )             32.6<L>                        10.9<L>   8.24 >-----------< 336    ( 10-11 @ 23:20 )             34.3<L>    10-13-22 @ 03:00      143  |  108  |  14  ----------------------------<  85  4.2   |  21<L>  |  0.60    10-12-22 @ 21:28      143  |  111<H>  |  11  ----------------------------<  82  4.4   |  24  |  0.60    10-12-22 @ 11:00      144  |  110<H>  |  9   ----------------------------<  91  3.7   |  26  |  0.49<L>    10-12-22 @ 03:58      142  |  108  |  9   ----------------------------<  113<H>  2.6<LL>   |  26  |  0.57    10-11-22 @ 23:20      145  |  112<H>  |  9   ----------------------------<  88  3.6   |  23  |  0.61        Ca    8.1<L>      13 Oct 2022 03:00  Ca    7.8<L>      12 Oct 2022 21:28  Ca    8.0<L>      12 Oct 2022 11:00  Ca    8.1<L>      12 Oct 2022 03:58  Ca    8.4      11 Oct 2022 23:20  Phos  2.7     10-  Phos  2.6     10-12  Mg     1.9     10-13  Mg     2.0     1012    TPro  6.1  /  Alb  3.2<L>  /  TBili  0.3  /  DBili  x   /  AST  31  /  ALT  36  /  AlkPhos  122<H>  10-13-22 @ 03:00  TPro  6.4  /  Alb  2.9<L>  /  TBili  0.3  /  DBili  x   /  AST  27  /  ALT  55  /  AlkPhos  129<H>  10-12-22 @ 03:58  TPro  6.6  /  Alb  2.7<L>  /  TBili  0.3  /  DBili  x   /  AST  44<H>  /  ALT  60  /  AlkPhos  136<H>  10-11-22 @ 23:20          Physical exam:  Gen: NAD  Abdomen: Soft, nontender, no distension , firm uterine fundus below the umbilicus.  Incision: Clean, dry, and intact w/ dermabond  Pelvic: Normal lochia  : lochia wnl, pham in place draining clear yellow urine  Ext: No calf tenderness, 2+ pedal edema b/l, Sosa's sign neg b/l    A/P: 37yo POD#3 s/p LTCS.  Patient is stable and is doing well post-operatively.  - Continue motrin, tylenol, oxycodone PRN for pain control.  - Increase ambulation  - Continue regular diet  - Discharge planning    Alma López MD PGY3 OB Postpartum Note:  Delivery, POD#9, HD#1    S: 37yo POD#9 s/p pLTCS at OSH, HD#1 a/w SOB and low O2 saturation, with findings consistent with postpartum cardiomyopathy. Overnight, the patient was weaned from 6L NC to 4L NC O2. She feels better than she did on initial presentation. Reports improvement in SOB, denies CP, palpitations, lightheadedness, dizziness, headache, vision changes. Pain is well controlled. She is tolerating a regular diet and passing flatus. She is ambulating without difficulty. Denies CP/SOB. Denies lightheadedness/dizziness. Denies N/V.    O:  Vitals:  Vital Signs Last 24 Hrs  T(C): 36.7 (13 Oct 2022 08:00), Max: 37.5 (12 Oct 2022 13:30)  T(F): 98.1 (13 Oct 2022 08:00), Max: 99.5 (12 Oct 2022 13:30)  HR: 124 (13 Oct 2022 11:00) (106 - 141)  BP: 133/88 (13 Oct 2022 11:00) (117/80 - 144/99)  BP(mean): 106 (13 Oct 2022 11:00) (89 - 115)  RR: 18 (13 Oct 2022 11:00) (15 - 36)  SpO2: 94% (13 Oct 2022 11:00) (83% - 99%)    Parameters below as of 13 Oct 2022 11:00  Patient On (Oxygen Delivery Method): nasal cannula      MEDICATIONS  (STANDING):  chlorhexidine 4% Liquid 1 Application(s) Topical <User Schedule>  enoxaparin Injectable 40 milliGRAM(s) SubCutaneous every 24 hours  hydrALAZINE 20 milliGRAM(s) Oral three times a day  pantoprazole    Tablet 40 milliGRAM(s) Oral before breakfast  senna 2 Tablet(s) Oral at bedtime    MEDICATIONS  (PRN):      LABS:  Blood type: O Positive  Rubella IgG: RPR: Negative                          10.1<L>   9.55 >-----------< 256    ( 10-13 @ 02:14 )             32.7<L>                        10.4<L>   11.66<H> >-----------< 325    ( 10-12 @ 03:58 )             32.6<L>                        10.9<L>   8.24 >-----------< 336    ( 10-11 @ 23:20 )             34.3<L>    10-13-22 @ 03:00      143  |  108  |  14  ----------------------------<  85  4.2   |  21<L>  |  0.60    10-12-22 @ 21:28      143  |  111<H>  |  11  ----------------------------<  82  4.4   |  24  |  0.60    10-12-22 @ 11:00      144  |  110<H>  |  9   ----------------------------<  91  3.7   |  26  |  0.49<L>    10-12-22 @ 03:58      142  |  108  |  9   ----------------------------<  113<H>  2.6<LL>   |  26  |  0.57    10-11-22 @ 23:20      145  |  112<H>  |  9   ----------------------------<  88  3.6   |  23  |  0.61        Ca    8.1<L>      13 Oct 2022 03:00  Ca    7.8<L>      12 Oct 2022 21:28  Ca    8.0<L>      12 Oct 2022 11:00  Ca    8.1<L>      12 Oct 2022 03:58  Ca    8.4      11 Oct 2022 23:20  Phos  2.7     10-  Phos  2.6     10-12  Mg     1.9     10-13  Mg     2.0     1012    TPro  6.1  /  Alb  3.2<L>  /  TBili  0.3  /  DBili  x   /  AST  31  /  ALT  36  /  AlkPhos  122<H>  10-13-22 @ 03:00  TPro  6.4  /  Alb  2.9<L>  /  TBili  0.3  /  DBili  x   /  AST  27  /  ALT  55  /  AlkPhos  129<H>  10-12-22 @ 03:58  TPro  6.6  /  Alb  2.7<L>  /  TBili  0.3  /  DBili  x   /  AST  44<H>  /  ALT  60  /  AlkPhos  136<H>  10-11-22 @ 23:20    Physical exam:  Gen: NAD  Abdomen: Soft, nontender, no distension , firm uterine fundus below the umbilicus.  Incision: Clean, dry, and intact w/ dermabond  Pelvic: Normal lochia  : lochia wnl, pham in place draining clear yellow urine  Ext: No calf tenderness, 2+ pedal edema b/l, Sosa's sign neg b/l

## 2022-10-13 NOTE — PROGRESS NOTE ADULT - ASSESSMENT
Patient seen and examined, agree with above assessment and plan as transcribed above.    - volume status improved  - cont to hold BB until euvolemic   - Maximize after load reducers.  Cardio OB eval noted, switching to ACE    Florencio Meek MD, [facility]  BEEPER [phone]

## 2022-10-13 NOTE — CHART NOTE - NSCHARTNOTEFT_GEN_A_CORE
RANDY JACOBS  MRN-[id number]      Accepting Hospitalist:   ==========  HPI:  Patient is a 37 yo Female, hx Gestational DM (diet controlled), Gestational HTN (did not require meds), recent LSCS 10/4/2022, Chronic anemia, presenting with gradual progressive shortness of breath and bilateral lower extremity swelling since returning home after LSCS. Pt's O2 saturation at home was recorded to be 80%, hence patient was brought to the ED. Pt currently on Bipap, reports no chest pain, fevers, abdominal pain, nausea, vomiting, dysuria. Pt only taking vitamins at home.     In the ED, vitals were  Temp 99.2, -130s, /86, RR 26, 92% RA> given tachypnea and tachycardia> transitioned to Bipap  RR 12, FiO2 100% saturating 99%  Xray chest pulm edema s/p 20 mg IV Lasix x 2 doses> 2500cc Carrion output, started on low dose Nitro drip, titrate to     Pt was at [facility] where echo revealed acutely reduced EF 15-20%. Pt was diuresed with Lasix 20 x2 and weaned off Bipap. She has remained on 6L NC with acceptable O2. She was transferred to [facility] CICU for continued management.  (13 Oct 2022 02:08)    ==========    INTERVAL HISTORY:  Presented to OSH in which initially requiring BiPAP, weaned to NC s/p diuresis. Transferred ICU to ICU for further management at [facility]. Given additional lasix this AM. Discontinued Isodril as per OBGYN 2/2 patient desire to breastfeed (as per OB- hydral okay to continue). Increased hydral dose 10->20     SUBJECTIVE:  Denies any CP, SOB at this time, improving CROOK, improving LE edema, abd pain, n/v/d      OBJECTIVE:     =============================  Vital Signs Last 24 Hrs  T(C): 36.7 (13 Oct 2022 08:00), Max: 37.5 (12 Oct 2022 13:30)  T(F): 98.1 (13 Oct 2022 08:00), Max: 99.5 (12 Oct 2022 13:30)  HR: 120 (13 Oct 2022 08:00) (104 - 141)  BP: 127/84 (13 Oct 2022 08:00) (117/80 - 144/99)  BP(mean): 101 (13 Oct 2022 08:00) (89 - 115)  RR: 20 (13 Oct 2022 08:00) (14 - 36)  SpO2: 94% (13 Oct 2022 08:00) (83% - 99%)    Parameters below as of 13 Oct 2022 08:00  Patient On (Oxygen Delivery Method): nasal cannula  O2 Flow (L/min): 4    ICU Vital Signs Last 24 Hrs  T(C): 36.7 (13 Oct 2022 08:00), Max: 37.5 (12 Oct 2022 13:30)  T(F): 98.1 (13 Oct 2022 08:00), Max: 99.5 (12 Oct 2022 13:30)  HR: 120 (13 Oct 2022 08:00) (104 - 141)  BP: 127/84 (13 Oct 2022 08:00) (117/80 - 144/99)  BP(mean): 101 (13 Oct 2022 08:00) (89 - 115)  ABP: --  ABP(mean): --  RR: 20 (13 Oct 2022 08:00) (14 - 36)  SpO2: 94% (13 Oct 2022 08:00) (83% - 99%)    O2 Parameters below as of 13 Oct 2022 08:00  Patient On (Oxygen Delivery Method): nasal cannula  O2 Flow (L/min): 4        ==============================      ============================================  MEDICATIONS  (STANDING):  chlorhexidine 4% Liquid 1 Application(s) Topical <User Schedule>  enoxaparin Injectable 40 milliGRAM(s) SubCutaneous every 24 hours  hydrALAZINE 20 milliGRAM(s) Oral three times a day  pantoprazole    Tablet 40 milliGRAM(s) Oral before breakfast    MEDICATIONS  (PRN):    ============================================        ==========  TELEMETRY: Sinus Tachycardia 110-120s   ==========    ==========  FOLLOW UP:  - HF consult, f/u recs  - F/U cards recs  - f/u obgyn recs  - uptitrate Afterload reduction as tolerated  - eval fluid status daily, diuresis prn   ==========  36F hx gDM, gHTN, chronic anemia, recent LSCS 10/4/2022 who presented with SOB 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     Plan    Neuro  AAOx3  - mildly anxious  - continue to monitor mental status as per protocol   - s/p keppra x24hrs for seizure prophylaxis 2/2 concern for preeclampsia     Respiratory  #Acute hypoxic respiratory failure, B/L pleural effusions   - was initially on BiPAP, weaned to 4L NC. Wean NC as tolerated with goal SpO2 >92%   - pt complaining of dyspnea on exertion, comfortable at rest   - appears fluid overloaded. S/P lasix 40 IVP x1 with goal net negative. Monitor Strict I&Os      Cardiovascular  #Postpartum cardiomyopathy   - 10/12 TTE: EF 15-20%- global dysfunction, Mild MR, Mod dilated LA. Grade III-IV  diastolic dysfunction (severe). Normal RV size & fxn. B/L pleural effusions   - pBNP 7K  - no evidence of end organ dysfunction, lactate negative. Continue to monitor perfusion indices daily   - consider eventual BB once more euvolemic   - trops downtrending at OSH   - consider HF consult  - c/w hydral 20 TID. Uptitrate AL reduction as tolerated   - discontinued isordil 10 as per OBGYN patient wants to breastfeed   - volume overloaded on exam, s/p Lasix 40 IVP x1 this AM for goal net neg. Reassess fluid status daily. Daily standing weights     GI  tolerating PO diet   - continue with protonix for GERD  - monitor for regular BM while inpatient     Renal  SCr WNL   - continue to monitor and trend SCr, BUN, lytes, and strict I&0s  - responding to Lasix 20 diuresis appropriately   - UA at OSH was wnl     Endocrine   # Hx gestational DM  - was diet controlled  - monitor glucose on CMP for need to initiate ISS   - f/u Hgb A1C     - TSH 1.31 wnl     Heme/Onc  # Hx chronic anemia  - h/h stable since admission. continue to monitor and trend cbc   - takes iron supplement at home  - mild vaginal bleeding s/p delivery, continue to monitor. OBGYN following   - Prophylaxis: Lovenox for DVT ppx      ID  afebrile, no leukocytosis  - monitor and trend WBC and temp curve     OBGYN  - s/p  10/4   - pt hopes to breastfeed, pumping while inpatient  - OB fellow contacted, will see pt in AM  - appreciate OB recs         Kya Lao PA-C RANDY JACOBS  MRN-[id number]      Accepting Hospitalist: Dr. Dre Guillen   Signed out to MAR- teams covered   ==========  HPI:  Patient is a 35 yo Female, hx Gestational DM (diet controlled), Gestational HTN (did not require meds), recent LSCS 10/4/2022, Chronic anemia, presenting with gradual progressive shortness of breath and bilateral lower extremity swelling since returning home after LSCS. Pt's O2 saturation at home was recorded to be 80%, hence patient was brought to the ED. Pt currently on Bipap, reports no chest pain, fevers, abdominal pain, nausea, vomiting, dysuria. Pt only taking vitamins at home.     In the ED, vitals were  Temp 99.2, -130s, /86, RR 26, 92% RA> given tachypnea and tachycardia> transitioned to Bipap  RR 12, FiO2 100% saturating 99%  Xray chest pulm edema s/p 20 mg IV Lasix x 2 doses> 2500cc Carrion output, started on low dose Nitro drip, titrate to     Pt was at [facility] where echo revealed acutely reduced EF 15-20%. Pt was diuresed with Lasix 20 x2 and weaned off Bipap. She has remained on 6L NC with acceptable O2. She was transferred to [facility] CICU for continued management.  (13 Oct 2022 02:08)    ==========    INTERVAL HISTORY:  Presented to OSH in which initially requiring BiPAP, weaned to NC s/p diuresis. Transferred ICU to ICU for further management at [facility]. Given additional lasix this AM. Discontinued Isodril as per OBGYN 2/2 patient desire to breastfeed (as per OB- hydral okay to continue). Increased hydral dose 10->20     SUBJECTIVE:  Denies any CP, SOB at this time, improving CROOK, improving LE edema, abd pain, n/v/d      OBJECTIVE:     =============================  Vital Signs Last 24 Hrs  T(C): 36.7 (13 Oct 2022 08:00), Max: 37.5 (12 Oct 2022 13:30)  T(F): 98.1 (13 Oct 2022 08:00), Max: 99.5 (12 Oct 2022 13:30)  HR: 120 (13 Oct 2022 08:00) (104 - 141)  BP: 127/84 (13 Oct 2022 08:00) (117/80 - 144/99)  BP(mean): 101 (13 Oct 2022 08:00) (89 - 115)  RR: 20 (13 Oct 2022 08:00) (14 - 36)  SpO2: 94% (13 Oct 2022 08:00) (83% - 99%)    Parameters below as of 13 Oct 2022 08:00  Patient On (Oxygen Delivery Method): nasal cannula  O2 Flow (L/min): 4    ICU Vital Signs Last 24 Hrs  T(C): 36.7 (13 Oct 2022 08:00), Max: 37.5 (12 Oct 2022 13:30)  T(F): 98.1 (13 Oct 2022 08:00), Max: 99.5 (12 Oct 2022 13:30)  HR: 120 (13 Oct 2022 08:00) (104 - 141)  BP: 127/84 (13 Oct 2022 08:00) (117/80 - 144/99)  BP(mean): 101 (13 Oct 2022 08:00) (89 - 115)  ABP: --  ABP(mean): --  RR: 20 (13 Oct 2022 08:00) (14 - 36)  SpO2: 94% (13 Oct 2022 08:00) (83% - 99%)    O2 Parameters below as of 13 Oct 2022 08:00  Patient On (Oxygen Delivery Method): nasal cannula  O2 Flow (L/min): 4        ==============================      ============================================  MEDICATIONS  (STANDING):  chlorhexidine 4% Liquid 1 Application(s) Topical <User Schedule>  enoxaparin Injectable 40 milliGRAM(s) SubCutaneous every 24 hours  hydrALAZINE 20 milliGRAM(s) Oral three times a day  pantoprazole    Tablet 40 milliGRAM(s) Oral before breakfast    MEDICATIONS  (PRN):    ============================================        ==========  TELEMETRY: Sinus Tachycardia 110-120s   ==========    ==========  FOLLOW UP:  - HF consult, f/u recs  - F/U cards recs  - f/u obgyn recs in regards to clearance for GDMT in the setting of breastfeeding (can use certain ACE-I while breastfeeding)   - uptitrate Afterload reduction as tolerated  - eval fluid status daily, diuresis prn. 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     Plan    Neuro  AAOx3  - mildly anxious  - continue to monitor mental status as per protocol   - s/p keppra x24hrs for seizure prophylaxis 2/2 concern for preeclampsia     Respiratory  #Acute hypoxic respiratory failure, B/L pleural effusions   - was initially on BiPAP, weaned to 4L NC. Wean NC as tolerated with goal SpO2 >92%   - pt complaining of dyspnea on exertion, comfortable at rest   - appears fluid overloaded. S/P lasix 40 IVP x1 with goal net negative. Monitor Strict I&Os      Cardiovascular  #Postpartum cardiomyopathy   - 10/12 TTE: EF 15-20%- global dysfunction, Mild MR, Mod dilated LA. Grade III-IV  diastolic dysfunction (severe). Normal RV size & fxn. B/L pleural effusions   - pBNP 7K  - no evidence of end organ dysfunction, lactate negative. Continue to monitor perfusion indices daily   - consider eventual BB once more euvolemic   - trops downtrending at OSH   - consider HF consult  - c/w hydral 20 TID. Uptitrate AL reduction as tolerated   - discontinued isordil 10 as per OBGYN patient wants to breastfeed   - volume overloaded on exam, s/p Lasix 40 IVP x1 this AM for goal net neg. Reassess fluid status daily. Daily standing weights     GI  tolerating PO diet   - continue with protonix for GERD  - monitor for regular BM while inpatient     Renal  SCr WNL   - continue to monitor and trend SCr, BUN, lytes, and strict I&0s  - responding to Lasix 20 diuresis appropriately   - UA at OSH was wnl     Endocrine   # Hx gestational DM  - was diet controlled  - monitor glucose on CMP for need to initiate ISS   - f/u Hgb A1C     - TSH 1.31 wnl     Heme/Onc  # Hx chronic anemia  - h/h stable since admission. continue to monitor and trend cbc   - takes iron supplement at home  - mild vaginal bleeding s/p delivery, continue to monitor. OBGYN following   - Prophylaxis: Lovenox for DVT ppx      ID  afebrile, no leukocytosis  - monitor and trend WBC and temp curve     OBGYN  - s/p  10/4   - pt hopes to breastfeed, pumping while inpatient  - OB fellow contacted, will see pt in AM  - appreciate OB recs         Kya Lao PA-C RANDY JACOBS  MRN-[id number]      Accepting Hospitalist: Dr. Dre Guillen   Signed out to MAR- teams covered   ==========  HPI:  Patient is a 37 yo Female, hx Gestational DM (diet controlled), Gestational HTN (did not require meds), recent LSCS 10/4/2022, Chronic anemia, presenting with gradual progressive shortness of breath and bilateral lower extremity swelling since returning home after LSCS. Pt's O2 saturation at home was recorded to be 80%, hence patient was brought to the ED. Pt currently on Bipap, reports no chest pain, fevers, abdominal pain, nausea, vomiting, dysuria. Pt only taking vitamins at home.     In the ED, vitals were  Temp 99.2, -130s, /86, RR 26, 92% RA> given tachypnea and tachycardia> transitioned to Bipap  RR 12, FiO2 100% saturating 99%  Xray chest pulm edema s/p 20 mg IV Lasix x 2 doses> 2500cc Carrion output, started on low dose Nitro drip, titrate to     Pt was at [facility] where echo revealed acutely reduced EF 15-20%. Pt was diuresed with Lasix 20 x2 and weaned off Bipap. She has remained on 6L NC with acceptable O2. She was transferred to [facility] CICU for continued management.  (13 Oct 2022 02:08)    ==========    INTERVAL HISTORY:  Presented to OSH in which initially requiring BiPAP, weaned to NC s/p diuresis. Transferred ICU to ICU for further management at [facility]. Given additional lasix this AM. Discontinued Isodril as per OBGYN 2/2 patient desire to breastfeed (as per OB- hydral okay to continue). Increased hydral dose 10->20     SUBJECTIVE:  Denies any CP, SOB at this time, improving CROOK, improving LE edema, abd pain, n/v/d      OBJECTIVE:     =============================  Vital Signs Last 24 Hrs  T(C): 36.7 (13 Oct 2022 08:00), Max: 37.5 (12 Oct 2022 13:30)  T(F): 98.1 (13 Oct 2022 08:00), Max: 99.5 (12 Oct 2022 13:30)  HR: 120 (13 Oct 2022 08:00) (104 - 141)  BP: 127/84 (13 Oct 2022 08:00) (117/80 - 144/99)  BP(mean): 101 (13 Oct 2022 08:00) (89 - 115)  RR: 20 (13 Oct 2022 08:00) (14 - 36)  SpO2: 94% (13 Oct 2022 08:00) (83% - 99%)    Parameters below as of 13 Oct 2022 08:00  Patient On (Oxygen Delivery Method): nasal cannula  O2 Flow (L/min): 4    ICU Vital Signs Last 24 Hrs  T(C): 36.7 (13 Oct 2022 08:00), Max: 37.5 (12 Oct 2022 13:30)  T(F): 98.1 (13 Oct 2022 08:00), Max: 99.5 (12 Oct 2022 13:30)  HR: 120 (13 Oct 2022 08:00) (104 - 141)  BP: 127/84 (13 Oct 2022 08:00) (117/80 - 144/99)  BP(mean): 101 (13 Oct 2022 08:00) (89 - 115)  ABP: --  ABP(mean): --  RR: 20 (13 Oct 2022 08:00) (14 - 36)  SpO2: 94% (13 Oct 2022 08:00) (83% - 99%)    O2 Parameters below as of 13 Oct 2022 08:00  Patient On (Oxygen Delivery Method): nasal cannula  O2 Flow (L/min): 4        ==============================      ============================================  MEDICATIONS  (STANDING):  chlorhexidine 4% Liquid 1 Application(s) Topical <User Schedule>  enoxaparin Injectable 40 milliGRAM(s) SubCutaneous every 24 hours  hydrALAZINE 20 milliGRAM(s) Oral three times a day  pantoprazole    Tablet 40 milliGRAM(s) Oral before breakfast    MEDICATIONS  (PRN):    ============================================        ==========  TELEMETRY: Sinus Tachycardia 110-120s   ==========    ==========  FOLLOW UP:  - HF consult, f/u recs  - F/U cards recs  - f/u obgyn recs in regards to clearance for GDMT in the setting of breastfeeding (starting ARB)  - uptitrate Afterload reduction as tolerated  - eval fluid status daily, diuresis prn. 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     Plan    Neuro  AAOx3  - mildly anxious  - continue to monitor mental status as per protocol   - s/p keppra x24hrs for seizure prophylaxis 2/2 concern for preeclampsia     Respiratory  #Acute hypoxic respiratory failure, B/L pleural effusions   - was initially on BiPAP, weaned to 4L NC. Wean NC as tolerated with goal SpO2 >92%   - pt complaining of dyspnea on exertion, comfortable at rest   - appears fluid overloaded. S/P lasix 40 IVP x1 with goal net negative. Monitor Strict I&Os      Cardiovascular  #Postpartum cardiomyopathy   - 10/12 TTE: EF 15-20%- global dysfunction, Mild MR, Mod dilated LA. Grade III-IV  diastolic dysfunction (severe). Normal RV size & fxn. B/L pleural effusions   - c/w hydral 20 TID. Uptitrate AL reduction as tolerated   - discontinued isordil 10 as per OBGYN patient wants to breastfeed   - start ARB tomorrow - per cards OB and OBGYN ok during breastfeeding  - consider eventual BB once more euvolemic - hold for now  - volume overloaded on exam, s/p Lasix 40 IVP x1 this AM for goal net neg. Reassess fluid status daily. Daily standing weights. Reassess need for diuresis frequently  - no evidence of end organ dysfunction, lactate negative. Continue to monitor perfusion indices daily     GI  tolerating PO diet   - continue with protonix for GERD  - monitor for regular BM while inpatient     Renal  SCr WNL   - continue to monitor and trend SCr, BUN, lytes, and strict I&0s  - responding to Lasix 20 diuresis appropriately   - UA at OSH was wnl     Endocrine   # Hx gestational DM  - was diet controlled  - monitor glucose on CMP for need to initiate ISS   - f/u Hgb A1C     - TSH 1.31 wnl     Heme/Onc  # Hx chronic anemia  - h/h stable since admission. continue to monitor and trend cbc   - takes iron supplement at home  - mild vaginal bleeding s/p delivery, continue to monitor. OBGYN following   - Prophylaxis: Lovenox for DVT ppx      ID  afebrile, no leukocytosis  - monitor and trend WBC and temp curve     OBGYN  - s/p  10/4   - pt hopes to breastfeed, pumping while inpatient  - OB fellow contacted, will see pt in AM  - appreciate OB recs         Kya Lao PA-C

## 2022-10-13 NOTE — H&P ADULT - ASSESSMENT
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     Plan    Neuro  - AAOx3  - mildly anxious  - will continue to monitor   - 24 hr keppra for seizure prophylaxis 2/2 concern for preeclampsia     Respiratory  #Acute hypoxic respiratory failure   - pt at OSH with SpO2 80%   - was initially on BiPAP, weaned to 6L NC   - pt complaining of dyspnea on exertion, comfortable at rest   - audible rales on exam, appears fluid overloaded  - will diurese and reevaluate     Cardiovascular  #Postpartum cardiomyopathy   - EF at OSH 15-20%  - no evidence of end organ dysfunction   - low threshold to place central access  - sinus tachy 130s  - consider starting BB in AM     #HTN  - c/w hydral 10   - c/w isordil 10     GI  - no active issues   - tolerating PO diet   - continue with protonix for GI ppx     Renal  - no active issues  - pt Cr stable  - responding to Lasix 20 diuresis appropriately   - UA at OSH was wnl     Endocrine   # Hx gestational DM  - was diet controlled  - monitor glucose on CMP   - monitor for need to initiate ISS     - f/u TSH, A1C     Heme/Onc  # Hx chronic anemia  - H/H low but acceptable, hgb 10.9   - continue to monitor   - takes iron supplement at home  - mild vaginal bleeding s/p delivery     Prophylaxis: Lovenox for DVT ppx      ID  - no active issues  - afebrile   - monitor off abx for now     OBGYN  - s/p  10/4   - pt hopes to breastfeed, pumping while inpatient  - OB fellow contacted, will see pt in AM  - appreciate OB recs       Patient requires continuous monitoring with bedside rhythm monitoring, pulse ox monitoring, and intermittent blood gas analysis. Care plan discussed with ICU care team. Patient remained critical and at risk for life threatening decompensation.  Patient seen, examined and plan discussed with CCU team during rounds.     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     Plan    Neuro  - AAOx3  - mildly anxious  - will continue to monitor   - 24 hr keppra for seizure prophylaxis 2/2 concern for preeclampsia     Respiratory  #Acute hypoxic respiratory failure   - pt at OSH with SpO2 80%   - was initially on BiPAP, weaned to 6L NC   - pt complaining of dyspnea on exertion, comfortable at rest   - audible rales on exam, appears fluid overloaded  - will diurese and reevaluate     Cardiovascular  #Postpartum cardiomyopathy   - EF at OSH 15-20%  - ProBNP elevated at 7K   - no evidence of end organ dysfunction, lactate negative   - low threshold to place central access  - sinus tachy 130s  - consider starting BB in AM   - trops uptrending at OSH approx 600, continue to trend      - Echo:   1. Tethered mitral valve leaflets. Mild mitral regurgitation.  2. Moderately dilated left atrium.  LA volume index = 42cc/m2.  3. Normal left ventricular internal dimensions and wall thicknesses.  4. Severe global left ventricular systolic dysfunction.  5. Grade III-IV  diastolic dysfunction (severe).  6. Normal right ventricular size and function.  7. Bilateral pleural effusions.      #HTN  - c/w hydral 10   - c/w isordil 10     GI  - no active issues   - tolerating PO diet   - continue with protonix for GI ppx     Renal  - no active issues  - pt Cr stable, 0.6  - responding to Lasix 20 diuresis appropriately   - UA at OSH was wnl     Endocrine   # Hx gestational DM  - was diet controlled  - monitor glucose on CMP   - monitor for need to initiate ISS     - f/u TSH, A1C     Heme/Onc  # Hx chronic anemia  - H/H low but acceptable, hgb 10.9   - continue to monitor   - takes iron supplement at home  - mild vaginal bleeding s/p delivery     Prophylaxis: Lovenox for DVT ppx      ID  - no active issues  - afebrile   - monitor off abx for now     OBGYN  - s/p  10/4   - pt hopes to breastfeed, pumping while inpatient  - OB fellow contacted, will see pt in AM  - appreciate OB recs       Patient requires continuous monitoring with bedside rhythm monitoring, pulse ox monitoring, and intermittent blood gas analysis. Care plan discussed with ICU care team. Patient remained critical and at risk for life threatening decompensation.  Patient seen, examined and plan discussed with CCU team during rounds.     I have personally provided 35 minutes of critical care time excluding time spent on separate procedures, in addition to initial critical care time provided by the CICU Attending,  ____

## 2022-10-13 NOTE — H&P ADULT - NSHPSOCIALHISTORY_GEN_ALL_CORE
Pt lives with . Denies etoh use, cigarette use or any illicit drug use.     Pt is 9 days post-op from .

## 2022-10-13 NOTE — PROGRESS NOTE ADULT - ATTENDING COMMENTS
MFM Attending    Patient evaluated and counseled on MFM rounds today.  Agree with the assessment and plan above.      In summary Ms. Roberson is a 35yo P1 POD9 s/p primary elective  who presented yesterday to [facility] complaining of shortness of breath and was diagnosed with peripartum cardiomyopathy.  She was transferred to [facility] for continued management.  She is clinically stable and her symptoms are improving with diuresis, and she is saturating well now on 2L oxygen by NC.  Her heart failure regimen will be decided by the Heart Failure teams and Dr. Gill of [facility].  We have discussed and will continue to discuss with the patient that none of her current medications are absolutely contraindicated in breastfeeding, although data on multiple agents is lacking.  Will continue to follow with you.  Appreciate excellent care by all the involved teams.

## 2022-10-13 NOTE — PROGRESS NOTE ADULT - SUBJECTIVE AND OBJECTIVE BOX
Ms. Roberson is a 37 yo Female, post op day 9-> recent  section on 2022. She carries a pregnancy history complicated by gestational DM (diet controlled), gestational HTN and chronic anemia.    She presented to [facility] with gradual progressive shortness of breath and bilateral lower extremity swelling since returning home after  section.   Pt's O2 saturation at home was recorded to be 80%.     In the ED, vitals were  Temp 99.2, -130s, /86, RR 26, 92% RA> given tachypnea and tachycardia> transitioned to Bipap  RR 12, FiO2 100% saturating 99%  Xray chest pulm edema s/p 20 mg IV Lasix x 2 doses> 2500cc Carrion output, started on low dose Nitro drip, titrate to     Pt was at [facility] where echo revealed acutely reduced EF 15-20%. Pt was diuresed with Lasix 20 x2 and weaned off Bipap. She has remained on 6L NC with acceptable O2. She was transferred to [facility] CICU for continued management.  (13 Oct 2022 02:08)          T(C): 36.8 (10-13-22 @ 12:00), Max: 37.7 (10-12-22 @ 06:45)  HR: 134 (10-13-22 @ 12:00) (98 - 141)  BP: 114/80 (10-13-22 @ 12:00) (114/80 - 166/99)  RR: 24 (10-13-22 @ 12:00) (14 - 36)  SpO2: 95% (10-13-22 @ 12:00) (83% - 100%)      Labs:                        10.1   9.55  )-----------( 256      ( 13 Oct 2022 02:14 )             32.7     10-13    143  |  108  |  14  ----------------------------<  85  4.2   |  21<L>  |  0.60    Ca    8.1<L>      13 Oct 2022 03:00  Phos  2.7     10-13  Mg     1.9     10-13    TPro  6.1  /  Alb  3.2<L>  /  TBili  0.3  /  DBili  x   /  AST  31  /  ALT  36  /  AlkPhos  122<H>  10-13    PT/INR - ( 11 Oct 2022 23:20 )   PT: 11.3 sec;   INR: 0.95 ratio         PTT - ( 11 Oct 2022 23:20 )  PTT:35.3 sec       DIagnostics :       Meds:  MEDICATIONS  (STANDING):  enoxaparin Injectable 40 milliGRAM(s) SubCutaneous every 24 hours  ferrous    sulfate 325 milliGRAM(s) Oral daily  hydrALAZINE 20 milliGRAM(s) Oral three times a day  senna 2 Tablet(s) Oral at bedtime               Ms. Roberson is a 35 yo Female with history of  recent  section on 2022. Now postop day # 9. She carries a pregnancy history complicated by gestational DM (diet controlled), gestational HTN and chronic anemia.    She presented to [facility] ER with gradual progressive shortness of breath and bilateral lower extremity swelling since returning home after  section.   Pt's O2 saturation at home was recorded to be 80%.     In the ED, vitals were  Temp 99.2, -130s, /86, RR 26, 92% RA> given tachypnea and tachycardia> transitioned to Bipap  RR 12, FiO2 100% saturating 99%  Xray chest pulm edema s/p 20 mg IV Lasix x 2 doses> 2500cc Carrion output, started on low dose Nitro drip, titrate to     Pt was at [facility] where echo revealed acutely reduced EF 15-20%. Pt was diuresed with Lasix 20 x2 and weaned off Bipap. She has remained on 6L NC with acceptable O2. She was transferred to [facility] CICU for continued management.  (13 Oct 2022 02:08).            T(C): 36.8 (10-13-22 @ 12:00), Max: 37.7 (10-12-22 @ 06:45)  HR: 134 (10-13-22 @ 12:00) (98 - 141)  BP: 114/80 (10-13-22 @ 12:00) (114/80 - 166/99)  RR: 24 (10-13-22 @ 12:00) (14 - 36)  SpO2: 95% (10-13-22 @ 12:00) (83% - 100%)      Labs:                        10.1   9.55  )-----------( 256      ( 13 Oct 2022 02:14 )             32.7     10-13    143  |  108  |  14  ----------------------------<  85  4.2   |  21<L>  |  0.60    Ca    8.1<L>      13 Oct 2022 03:00  Phos  2.7     10-13  Mg     1.9     10-13    TPro  6.1  /  Alb  3.2<L>  /  TBili  0.3  /  DBili  x   /  AST  31  /  ALT  36  /  AlkPhos  122<H>  10-13    PT/INR - ( 11 Oct 2022 23:20 )   PT: 11.3 sec;   INR: 0.95 ratio         PTT - ( 11 Oct 2022 23:20 )  PTT:35.3 sec       DIagnostics :       Meds:  MEDICATIONS  (STANDING):  enoxaparin Injectable 40 milliGRAM(s) SubCutaneous every 24 hours  ferrous    sulfate 325 milliGRAM(s) Oral daily  hydrALAZINE 20 milliGRAM(s) Oral three times a day  senna 2 Tablet(s) Oral at bedtime               Ms. Roberson is a 35 yo Female with history of  recent  section on 2022. Now postop day # 9. She carries a pregnancy history complicated by gestational DM (diet controlled), gestational HTN and chronic anemia.    She presented to [facility] ER with gradual progressive shortness of breath and bilateral lower extremity swelling since returning home after  section.   Pt's O2 saturation at home was recorded to be 80%.     In the ED, vitals were  Temp 99.2, -130s, /86, RR 26, 92% RA> given tachypnea and tachycardia> transitioned to Bipap  RR 12, FiO2 100% saturating 99%  Xray chest pulm edema s/p 20 mg IV Lasix x 2 doses> 2500cc Carrion output, started on low dose Nitro drip, titrate to     Pt was at [facility] where echo revealed acutely reduced EF 15-20%. Pt was diuresed with Lasix 20 x2 and weaned off Bipap. She has remained on 6L NC with acceptable O2. She was transferred to [facility] CICU for continued management.  (13 Oct 2022 02:08).            T(C): 36.8 (10-13-22 @ 12:00), Max: 37.7 (10-12-22 @ 06:45)  HR: 134 (10-13-22 @ 12:00) (98 - 141)  BP: 114/80 (10-13-22 @ 12:00) (114/80 - 166/99)  RR: 24 (10-13-22 @ 12:00) (14 - 36)  SpO2: 95% (10-13-22 @ 12:00) (83% - 100%)      Labs:                        10.1   9.55  )-----------( 256      ( 13 Oct 2022 02:14 )             32.7     10-13    143  |  108  |  14  ----------------------------<  85  4.2   |  21<L>  |  0.60    Ca    8.1<L>      13 Oct 2022 03:00  Phos  2.7     10-13  Mg     1.9     10-13    TPro  6.1  /  Alb  3.2<L>  /  TBili  0.3  /  DBili  x   /  AST  31  /  ALT  36  /  AlkPhos  122<H>  10-13    PT/INR - ( 11 Oct 2022 23:20 )   PT: 11.3 sec;   INR: 0.95 ratio         PTT - ( 11 Oct 2022 23:20 )  PTT:35.3 sec      DIAGNOSITICS   Echocardiogram  2022    LVEF 15-20%  Tethered mitral valve leaflets  Mild MR  Moderately dilated LA  Normal LV internal dimensions and wall thicknesses  Severe global let ventricular systolic dysfunction  Grade lll- lV diastolic dysfunction  Normal right ventricular size and function  Bilateral pleural effusions        Meds:  MEDICATIONS  (STANDING):  enoxaparin Injectable 40 milliGRAM(s) SubCutaneous every 24 hours  ferrous    sulfate 325 milliGRAM(s) Oral daily  hydrALAZINE 20 milliGRAM(s) Oral three times a day  senna 2 Tablet(s) Oral at bedtime

## 2022-10-13 NOTE — PATIENT PROFILE ADULT - FALL HARM RISK - HARM RISK INTERVENTIONS

## 2022-10-13 NOTE — CONSULT NOTE ADULT - PROBLEM SELECTOR RECOMMENDATION 9
- lasix 40mg IV BID  - daily standing weights  - strict I/Os  - maintain K 4-4.5, Mg 2-2.5  - She can likely be discharged home in the upcoming days once euvolemic with plan for close follow up for further medication uptitration

## 2022-10-13 NOTE — CONSULT NOTE ADULT - NS ATTEND BILL GEN_ALL_CORE
Routing refill request to provider for review/approval because:  Dose warning  Kin Blanchard RN, BSN           Attending to bill

## 2022-10-13 NOTE — PROGRESS NOTE ADULT - PROBLEM SELECTOR PLAN 5
DVT PPx: Lovenox; encourage ambulation  Diet: Dash  Pain control: Tylenol for post op pain  if needed  Dispo: Medically Active

## 2022-10-13 NOTE — PROGRESS NOTE ADULT - ATTENDING COMMENTS
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 due to postpartum cardiomyopathy  A+Ox3  Hemodynamics acceptable, no pressors or inotropes  Continue Hydralazine for afterload reduction - titrate up as able  Will defer to OB and HF re: afterload reduction regimen (i.e., Hydralazine vs. ACE/ARB)  NSTEMI likely due to increased intracardiac pressures from overload/cardiomyopathy  Defer beta blocker until more euvolemic  O2 sats mid to high 90s on nasal cannula - wean to room air  DASH diet; LFTs unremarkable  Normal renal function, volume up on exam - continue IV diuresis for at least 2L overall negative  H/H low but acceptable on HSQ for DVT prophylaxis  Afebrile, no antibiotics  Sugars controlled  No central access  OOB

## 2022-10-13 NOTE — PROGRESS NOTE ADULT - PROBLEM SELECTOR PLAN 1
2/2 PPCM with EF 15-20% seen on 10/12 echo  -c/b AHRF; now improved (transitioned from BiPAP to NC)  -s/p IV Lasix 40 x2 with improvement  -consider another IV Lasix 40 dose today as still has signs of pulm edema on exam   -Strict I/Os  -Daily standing weights  -consider d/c pham  -GDMT by Cards OB  -c/w Losartan   -c/w hydralazine   -d/c nitro because patient would like to breastfeed   -consider asking Cards OB about Aldosterone receptor antagonist for mortality benefit  -hold metoprolol for now because patient is having appropriate tachycardic response to low CO 2/2 PPCM with EF 15-20% seen on 10/12 echo  -warm and wet   -c/b AHRF; now improved (transitioned from BiPAP to NC)  -s/p IV Lasix 40 x2 with improvement  -consider another IV Lasix 40 dose today as still has signs of pulm edema on exam   -Strict I/Os  -Daily standing weights  -consider d/c pham  -GDMT by Cards OB  -c/w Losartan   -c/w hydralazine   -d/c nitro because patient would like to breastfeed   -hold metoprolol for now because patient is having appropriate tachycardic response to low CO

## 2022-10-13 NOTE — PROGRESS NOTE ADULT - SUBJECTIVE AND OBJECTIVE BOX
INTERVAL HPI/OVERNIGHT EVENTS:    SUBJECTIVE: Patient seen and examined at bedside.    OBJECTIVE:    VITAL SIGNS:  ICU Vital Signs Last 24 Hrs  T(C): 36.5 (13 Oct 2022 14:00), Max: 37.1 (13 Oct 2022 06:00)  T(F): 97.7 (13 Oct 2022 14:00), Max: 98.7 (13 Oct 2022 06:00)  HR: 130 (13 Oct 2022 14:00) (106 - 140)  BP: 124/86 (13 Oct 2022 14:00) (114/80 - 144/99)  BP(mean): 94 (13 Oct 2022 13:00) (89 - 115)  ABP: --  ABP(mean): --  RR: 19 (13 Oct 2022 14:00) (15 - 32)  SpO2: 95% (13 Oct 2022 14:00) (83% - 99%)    O2 Parameters below as of 13 Oct 2022 14:00  Patient On (Oxygen Delivery Method): nasal cannula  O2 Flow (L/min): 2            10-12 @ 07:  -  10-13 @ 07:00  --------------------------------------------------------  IN: 540 mL / OUT: 925 mL / NET: -385 mL    10-13 @ 07:01  -  10-13 @ 16:03  --------------------------------------------------------  IN: 240 mL / OUT: 1055 mL / NET: -815 mL      CAPILLARY BLOOD GLUCOSE          PHYSICAL EXAM:    General: NAD  HEENT: NC/AT; PERRL, clear conjunctiva  Neck: supple  Respiratory: CTA b/l  Cardiovascular: +S1/S2; RRR  Abdomen: soft, NT/ND; +BS x4  Extremities:  no LE edema  Vascular: WWP, 2+ peripheral pulses b/l;  Skin: normal color and turgor; no rash  Neurological: A&Ox3, move all extremities. CN II-XII intact    MEDICATIONS:  MEDICATIONS  (STANDING):  enoxaparin Injectable 40 milliGRAM(s) SubCutaneous every 24 hours  ferrous    sulfate 325 milliGRAM(s) Oral daily  hydrALAZINE 20 milliGRAM(s) Oral three times a day  senna 2 Tablet(s) Oral at bedtime    MEDICATIONS  (PRN):  pantoprazole    Tablet 40 milliGRAM(s) Oral before breakfast PRN acid reflux      ALLERGIES:  Allergies    No Known Allergies    Intolerances        LABS:                        10.1   9.55  )-----------( 256      ( 13 Oct 2022 02:14 )             32.7     10-13    143  |  108  |  14  ----------------------------<  85  4.2   |  21<L>  |  0.60    Ca    8.1<L>      13 Oct 2022 03:00  Phos  2.7     10-13  Mg     1.9     10-13    TPro  6.1  /  Alb  3.2<L>  /  TBili  0.3  /  DBili  x   /  AST  31  /  ALT  36  /  AlkPhos  122<H>  1013    PT/INR - ( 11 Oct 2022 23:20 )   PT: 11.3 sec;   INR: 0.95 ratio         PTT - ( 11 Oct 2022 23:20 )  PTT:35.3 sec  Urinalysis Basic - ( 12 Oct 2022 01:32 )    Color: Yellow / Appearance: Clear / S.005 / pH: x  Gluc: x / Ketone: Negative  / Bili: Negative / Urobili: Negative   Blood: x / Protein: Negative / Nitrite: Negative   Leuk Esterase: Negative / RBC: 0-2 /HPF / WBC 0-2 /HPF   Sq Epi: x / Non Sq Epi: Few /HPF / Bacteria: Trace /HPF        RADIOLOGY & ADDITIONAL TESTS: Reviewed. INTERVAL HPI/OVERNIGHT EVENTS: Patient presented from [facility] for CICU management for PPM. At [facility], patient was transition from BIPAP to NC. Patient also received 2 doses of IV Lasix 40 with improvement in respiratory status and LE swelling.     SUBJECTIVE: Patient seen and examined at bedside. Today patient stated she is feeling much improved from before. She states that she does not feel SOB, does not have trouble lying flat, denies headache, chest pain, palpitations, abdominal distension, acute vision changes. She reports improved lower extremity swelling, but she feels her swelling has improved drastically. Also endorse Nausea, dry cough, and irritated throat.     OBJECTIVE:    VITAL SIGNS:  ICU Vital Signs Last 24 Hrs  T(C): 36.5 (13 Oct 2022 14:00), Max: 37.1 (13 Oct 2022 06:00)  T(F): 97.7 (13 Oct 2022 14:00), Max: 98.7 (13 Oct 2022 06:00)  HR: 130 (13 Oct 2022 14:00) (106 - 140)  BP: 124/86 (13 Oct 2022 14:00) (114/80 - 144/99)  BP(mean): 94 (13 Oct 2022 13:00) (89 - 115)  ABP: --  ABP(mean): --  RR: 19 (13 Oct 2022 14:00) (15 - 32)  SpO2: 95% (13 Oct 2022 14:00) (83% - 99%)    O2 Parameters below as of 13 Oct 2022 14:00  Patient On (Oxygen Delivery Method): nasal cannula  O2 Flow (L/min): 2            10-12 @ 07:01  -  10-13 @ 07:00  --------------------------------------------------------  IN: 540 mL / OUT: 925 mL / NET: -385 mL    10-13 @ :01  -  10-13 @ 16:03  --------------------------------------------------------  IN: 240 mL / OUT: 1055 mL / NET: -815 mL      CAPILLARY BLOOD GLUCOSE          PHYSICAL EXAM:    General: NAD  HEENT: NC/AT; PERRL, clear conjunctiva  Neck: supple; JVD noted  Respiratory: Diffuse crackles heard bilaterally, no wheezing or rhonchi; Breathsounds heard throughout  Cardiovascular: +S1/S2; Tachycardic with regular rhythm   Abdomen: soft, mildly tender to palpation, + BS, no HSM, no fluid wave  Extremities:  minimal LE edema, non pitting  Vascular: WWP, 2+ peripheral pulses b/l;  Skin: normal color and turgor; no rash  Neurological: A&Ox3, move all extremities.     MEDICATIONS:  MEDICATIONS  (STANDING):  enoxaparin Injectable 40 milliGRAM(s) SubCutaneous every 24 hours  ferrous    sulfate 325 milliGRAM(s) Oral daily  hydrALAZINE 20 milliGRAM(s) Oral three times a day  senna 2 Tablet(s) Oral at bedtime    MEDICATIONS  (PRN):  pantoprazole    Tablet 40 milliGRAM(s) Oral before breakfast PRN acid reflux      ALLERGIES:  Allergies    No Known Allergies    Intolerances        LABS:                        10.1   9.55  )-----------( 256      ( 13 Oct 2022 02:14 )             32.7     10-13    143  |  108  |  14  ----------------------------<  85  4.2   |  21<L>  |  0.60    Ca    8.1<L>      13 Oct 2022 03:00  Phos  2.7     10-13  Mg     1.9     10-13    TPro  6.1  /  Alb  3.2<L>  /  TBili  0.3  /  DBili  x   /  AST  31  /  ALT  36  /  AlkPhos  122<H>  10-13    PT/INR - ( 11 Oct 2022 23:20 )   PT: 11.3 sec;   INR: 0.95 ratio         PTT - ( 11 Oct 2022 23:20 )  PTT:35.3 sec  Urinalysis Basic - ( 12 Oct 2022 01:32 )    Color: Yellow / Appearance: Clear / S.005 / pH: x  Gluc: x / Ketone: Negative  / Bili: Negative / Urobili: Negative   Blood: x / Protein: Negative / Nitrite: Negative   Leuk Esterase: Negative / RBC: 0-2 /HPF / WBC 0-2 /HPF   Sq Epi: x / Non Sq Epi: Few /HPF / Bacteria: Trace /HPF    < from: Transthoracic Echocardiogram (10.12.22 @ 08:02) >  Ejection Fraction Visual Estimate: 15-20 %    < end of copied text >  < from: Transthoracic Echocardiogram (10.12.22 @ 08:02) >  CONCLUSIONS:  1. Tethered mitral valve leaflets. Mild mitral  regurgitation.  2. Moderately dilated left atrium.  LA volume index = 42  cc/m2.  3. Normal left ventricular internal dimensions and wall  thicknesses.  4. Severe global left ventricular systolic dysfunction.  5. Grade III-IV  diastolic dysfunction(severe).  6. Normal right ventricular size and function.  7. Bilateral pleural effusions.      < end of copied text >      RADIOLOGY & ADDITIONAL TESTS: Reviewed.  < from: Xray Chest 1 View-PORTABLE IMMEDIATE (Xray Chest 1 View-PORTABLE IMMEDIATE .) (10.12.22 @ 18:13) >    Impression:    Low lung volumes with pulmonary venous congestion, cardiomegaly,   bilateral effusions and lower lobe consolidations. Cannot exclude   underlying infiltrate.    < end of copied text >

## 2022-10-13 NOTE — PROGRESS NOTE ADULT - NS ATTEND AMEND GEN_ALL_CORE FT
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% - with global dysfunction  - patient would benefit from continuous diuresis to maintian negative fluid management - she is still on supplemental oxygen support and has evidence of persistent tachycardia (a way for her to maintian her overall cardiac output) - would avoid beta blockers until more euvolemic  - discussed the patient with MFM at bedside - agreed to start the arb - in case will need entresto in the future when she is no longer breastfeeding  - would consider further evaluation of the cardiomyopathy to possibly rule out SCAD (positive hsTrop)  - ambulation encouraged  - appreciate heart failure input

## 2022-10-13 NOTE — H&P ADULT - NSHPPHYSICALEXAM_GEN_ALL_CORE
PHYSICAL EXAM:  Constitutional: Patient laying in bed, NAD  Head: Atraumatic, normocephalic  Eyes: No scleral icterus. PERRLA. EOMI  ENMT: Moist mucous membrane. Uvula midline  Neck: Supple, No JVD  Respiratory: CTA B/L. No wheezes, rales or rhonchi   Cardiovascular: S1/S2. No murmurs, rubs, or gallops.  Gastrointestinal: Nondistended, BSx4, soft, nontender. + scar c/d/i.   Extremities: Moves all extremities. Warm, 2+ pitting edema in bilateral lower extremities.   Vascular: 2+ DP/PT pulses B/L   Neurological: A&Ox3. Follows commands. No focal deficits.   Skin: No rashes  on exposed skin PHYSICAL EXAM:  Constitutional: Patient laying in bed, NAD  Head: Atraumatic, normocephalic  Eyes: No scleral icterus. PERRLA. EOMI  ENMT: Moist mucous membrane. Uvula midline  Neck: Supple, No JVD  Respiratory: CTA B/L. +rales bilateral  Cardiovascular: S1/S2. No murmurs, rubs, or gallops.  Gastrointestinal: Nondistended, BSx4, soft, nontender. + scar c/d/i.   Extremities: Moves all extremities. Warm, 2+ pitting edema in bilateral lower extremities.   Vascular: 2+ DP/PT pulses B/L   Neurological: A&Ox3. Follows commands. No focal deficits.   Skin: No rashes  on exposed skin

## 2022-10-13 NOTE — PROGRESS NOTE ADULT - ASSESSMENT
A/P: 35yo POD#9 s/p pLTCS, a/w postpartum cardiomyopathy w/ complaints of SOB requiring supplemental O2. Patient symptomatically improving, successfully weaning O2. Patient hemodynamically and clinically stable.     #Postpartum Cardiomyopathy  - EKG sinus tachycardia  - Echo(10/12): EF 15-20%, severe global LV systolic dysfunction, Grade 3-4(severe) diastolic dysfunction, nml RV function, b/l pleural effusions  - Appreciate excellent care per primary team  - Continue diuresis w/ Lasix and Hydralazine   - Continue to wean off O2 as tolerated  - Monitor VS closely    #Postoperative Status  - Consider d/c pham   - Analgesics PRN  - Patient can continue to breastfeed while receiving current medication regimen  - DVT ppx: Lovenox and ambulation as tolerated    Alma López MD PGY3 A/P: 35yo POD#9 s/p pLTCS, a/w postpartum cardiomyopathy w/ complaints of SOB requiring supplemental O2. Patient symptomatically improving, successfully weaning O2. Patient hemodynamically and clinically stable.     #Postpartum Cardiomyopathy  - EKG sinus tachycardia  - Echo(10/12): EF 15-20%, severe global LV systolic dysfunction, Grade 3-4(severe) diastolic dysfunction, nml RV function, b/l pleural effusions  - Appreciate excellent care per primary team  - Continue diuresis w/ Lasix and Hydralazine   - Continue to wean off O2 as tolerated  - Monitor VS closely    #Postoperative Status  - Consider d/c pham   - Analgesics PRN  - Patient can continue to breastfeed while receiving current medication regimen  - DVT ppx: Lovenox and ambulation as tolerated    Alma López MD PGY3      MFM FELLOW ADDENDUM:   37 yo now  on POD 9 s/p primary  delivery, presenting to [facility] with SOB and found to have diastolic and systolic dysfunction on TTE, with 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 Patient admitted to the CICU, and now transferred to the floor, given improving. S/p diuresis and on hydralazine for afterload reduction. From a post operative perspective, scar appears well and healing appropriately. Fundus palpated on exam, firm. Minimal lochia, appropriate.     - Appreciated excellent care per ICU team and primary team   - Discussed case with CardioOB team, appreciate reccs   - Use of ACEi/ ARBs discussed and agree with use in this setting, while breastfeeding   - Patient with pump and pump supplies    ANJELICA Chawla Fellow  Seen and discussed with ANJELICA Martin Attending

## 2022-10-13 NOTE — CONSULT NOTE ADULT - ASSESSMENT
Ms. Roberson is a 37 y/o F with PMH of asthma, gestational DM (diet controlled) and gestational (HTN, not requiring medication, outpatient BP generally 130/80s), mild COVID infection in 8/2022 who had an elective csection on 10/4 which was uncomplicated, with newly diagnosed HFrEF (LVEF 15-20%, LVIDd 5.6cm), which is likely a peripartum cardiomyopathy. She is improving from a volume standpoint, with improvement in symptoms and PVC on CXR although is currently still volume overloaded. She is tachycardic which appears to be new as outpatient records show HR 70-90s and is likely compensatory. She has room for further afterload reduction. She has normal renal function.     10/12 TTE: LVEF 15-20%, LVIDd 5.6cm, grade III-IV DD, nl RV size and function, mild MR, mild TR

## 2022-10-13 NOTE — PROGRESS NOTE ADULT - SUBJECTIVE AND OBJECTIVE BOX
RANDY JACOBS  MRN-[id number]  Patient is a 36y old  Female who presents with a chief complaint of Postpartum Cardiomyopathy (13 Oct 2022 02:08)    HPI:  Patient is a 35 yo Female, hx Gestational DM (diet controlled), Gestational HTN (did not require meds), recent LSCS 10/4/2022, Chronic anemia, presenting with gradual progressive shortness of breath and bilateral lower extremity swelling since returning home after LSCS. Pt's O2 saturation at home was recorded to be 80%, hence patient was brought to the ED. Pt currently on Bipap, reports no chest pain, fevers, abdominal pain, nausea, vomiting, dysuria. Pt only taking vitamins at home.     In the ED, vitals were  Temp 99.2, -130s, /86, RR 26, 92% RA> given tachypnea and tachycardia> transitioned to Bipap  RR 12, FiO2 100% saturating 99%  Xray chest pulm edema s/p 20 mg IV Lasix x 2 doses> 2500cc Carrion output, started on low dose Nitro drip, titrate to     Pt was at [facility] where echo revealed acutely reduced EF 15-20%. Pt was diuresed with Lasix 20 x2 and weaned off Bipap. She has remained on 6L NC with acceptable O2. She was transferred to [facility] CICU for continued management.  (13 Oct 2022 02:08)      24 HOUR EVENTS:    REVIEW OF SYSTEMS:  CONSTITUTIONAL: No weakness, fevers or chills  EYES/ENT: No visual changes;  No vertigo or throat pain   NECK: No pain or stiffness  RESPIRATORY: No cough, wheezing, hemoptysis;   CARDIOVASCULAR: No chest pain or palpitations.  GASTROINTESTINAL: No abdominal or epigastric pain. No nausea, vomiting, or hematemesis; No diarrhea or constipation. No melena or hematochezia.  GENITOURINARY: No dysuria, frequency or hematuria.  NEUROLOGICAL: No numbness or weakness  SKIN: No itching, rashes.      ICU Vital Signs Last 24 Hrs  T(C): 37.1 (13 Oct 2022 06:00), Max: 37.7 (12 Oct 2022 07:45)  T(F): 98.7 (13 Oct 2022 06:00), Max: 99.9 (12 Oct 2022 07:45)  HR: 126 (13 Oct 2022 07:00) (99 - 141)  BP: 123/82 (13 Oct 2022 07:00) (117/80 - 144/99)  BP(mean): 98 (13 Oct 2022 07:00) (89 - 115)  ABP: --  ABP(mean): --  RR: 21 (13 Oct 2022 07:00) (14 - 36)  SpO2: 94% (13 Oct 2022 07:00) (83% - 99%)    O2 Parameters below as of 13 Oct 2022 07:00  Patient On (Oxygen Delivery Method): nasal cannula  O2 Flow (L/min): 4    I&O's Summary    12 Oct 2022 07:01  -  13 Oct 2022 07:00  --------------------------------------------------------  IN: 440 mL / OUT: 925 mL / NET: -485 mL    CAPILLARY BLOOD GLUCOSE    CAPILLARY BLOOD GLUCOSE      PHYSICAL EXAM:  GENERAL: No acute distress, well-developed  HEAD:  Atraumatic, Normocephalic  EYES: EOMI, PERRLA, conjunctiva and sclera clear  NECK: Supple, no lymphadenopathy, no JVD  CHEST/LUNG:   HEART: Regular rate and rhythm. Normal S1/S2. No murmurs, rubs, or gallops  ABDOMEN: Soft, non-tender, non-distended; normal bowel sounds, no organomegaly  EXTREMITIES:  2+ peripheral pulses b/l,   NEUROLOGY: A&O x 3, no focal deficits  SKIN: No rashes or lesions    ============================I/O===========================   I&O's Detail    12 Oct 2022 07:01  -  13 Oct 2022 07:00  --------------------------------------------------------  IN:    IV PiggyBack: 200 mL    Oral Fluid: 240 mL  Total IN: 440 mL    OUT:    Indwelling Catheter - Urethral (mL): 925 mL  Total OUT: 925 mL    Total NET: -485 mL        ============================ LABS =========================                        10.1   9.55  )-----------( 256      ( 13 Oct 2022 02:14 )             32.7     10-13    143  |  108  |  14  ----------------------------<  85  4.2   |  21<L>  |  0.60    Ca    8.1<L>      13 Oct 2022 03:00  Phos  2.7     10-13  Mg     1.9     10-13    TPro  6.1  /  Alb  3.2<L>  /  TBili  0.3  /  DBili  x   /  AST  31  /  ALT  36  /  AlkPhos  122<H>  10-13  Troponin T, High Sensitivity Result: 59 ng/L (10-13-22 @ 03:00)    LIVER FUNCTIONS - ( 13 Oct 2022 03:00 )  Alb: 3.2 g/dL / Pro: 6.1 g/dL / ALK PHOS: 122 U/L / ALT: 36 U/L / AST: 31 U/L / GGT: x           PT/INR - ( 11 Oct 2022 23:20 )   PT: 11.3 sec;   INR: 0.95 ratio    PTT - ( 11 Oct 2022 23:20 )  PTT:35.3 sec    Lactate, Blood: 1.4 mmol/L (10-13-22 @ 02:14)  Lactate, Blood: 2.0 mmol/L (10-12-22 @ 01:32)    Urinalysis Basic - ( 12 Oct 2022 01:32 )    Color: Yellow / Appearance: Clear / S.005 / pH: x  Gluc: x / Ketone: Negative  / Bili: Negative / Urobili: Negative   Blood: x / Protein: Negative / Nitrite: Negative   Leuk Esterase: Negative / RBC: 0-2 /HPF / WBC 0-2 /HPF   Sq Epi: x / Non Sq Epi: Few /HPF / Bacteria: Trace /HPF      ======================Micro/Rad/Cardio=================  Telemtry: Reviewed   EKG: Reviewed  CXR: Reviewed  Culture: Reviewed   Echo:   Cath:  ======================================================  PAST MEDICAL & SURGICAL HISTORY:  Gestational diabetes  Gestational hypertension  H/O  section        ====================ASSESSMENT AND PLAN==============  36F hx gestational diabetes, gestational hypertension, chronic anemia, recent  10/4/2022 who presented with SOB 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     ====================== NEUROLOGY=====================  - A&O x3      ==================== RESPIRATORY======================  #Acute hypoxic respiratory failure   - pt at OSH with SpO2 80%   - weaned from Bipap to 6L NC   -     ====================CARDIOVASCULAR==================  #Postpartum cardiomyopathy   - EF at OSH 15-20%  - ProBNP elevated at 7K   - no evidence of end organ dysfunction, lactate negative          - Echo:   1. Tethered mitral valve leaflets. Mild mitral regurgitation.  2. Moderately dilated left atrium.  LA volume index = 42cc/m2.  3. Normal left ventricular internal dimensions and wall thicknesses.  4. Severe global left ventricular systolic dysfunction.  5. Grade III-IV  diastolic dysfunction (severe).  6. Normal right ventricular size and function.  7. Bilateral pleural effusions.    #HTN  - hydralazine increased to 20mg TID        ==================== GASTROINTESTINAL===================    Diet:  Last BM:   Indication for Stress Ulcer Prophylaxis, [ ] Yes    [ ] No   If Yes, Medication:     pantoprazole    Tablet 40 milliGRAM(s) Oral before breakfast      ===================== RENAL =========================  - no active issues  - am Cr 0.6, stable  - lasix 40 mg given in am        10-12-22 @ 07:01  -  10-13-22 @ 07:00  --------------------------------------------------------  IN: 440 mL / OUT: 925 mL / NET: -485 mL    =======================    ENDOCRINE  =====================  #gestational diabetes  - was diet controlled  - glucose on BMP within normal limits  - follow up A1c      ========================INFECTIOUS DISEASE================    Culture - Blood (collected 10-12-22 @ 01:15)  Source: .Blood Blood-Peripheral  Preliminary Report (10-13-22 @ 07:02):    No growth to date.    Culture - Blood (collected 10-12-22 @ 01:10)  Source: .Blood Blood-Peripheral  Preliminary Report (10-13-22 @ 07:02):    No growth to date.      ===================HEMATOLOGIC/ONC ===================  #normocytic anemia, stable    enoxaparin Injectable 40 milliGRAM(s) SubCutaneous every 24 hours    ======================= LINES/TUBES  =====================    Carrion: placed 10/12 @ OSH    ======================= Disposition  =====================       RANDY JACOBS  MRN-[id number]  Patient is a 36y old  Female who presents with a chief complaint of Postpartum Cardiomyopathy (13 Oct 2022 02:08)    HPI:  Patient is a 35 yo Female, hx Gestational DM (diet controlled), Gestational HTN (did not require meds), recent LSCS 10/4/2022, Chronic anemia, presenting with gradual progressive shortness of breath and bilateral lower extremity swelling since returning home after LSCS. Pt's O2 saturation at home was recorded to be 80%, hence patient was brought to the ED. Pt currently on Bipap, reports no chest pain, fevers, abdominal pain, nausea, vomiting, dysuria. Pt only taking vitamins at home.     In the ED, vitals were  Temp 99.2, -130s, /86, RR 26, 92% RA> given tachypnea and tachycardia> transitioned to Bipap  RR 12, FiO2 100% saturating 99%  Xray chest pulm edema s/p 20 mg IV Lasix x 2 doses> 2500cc Carrion output, started on low dose Nitro drip, titrate to     Pt was at [facility] where echo revealed acutely reduced EF 15-20%. Pt was diuresed with Lasix 20 x2 and weaned off Bipap. She has remained on 6L NC with acceptable O2. She was transferred to [facility] CICU for continued management.  (13 Oct 2022 02:08)      24 HOUR EVENTS:    REVIEW OF SYSTEMS:  CONSTITUTIONAL: No weakness, fevers or chills.  RESPIRATORY: Reports shortness of breath on exertion. No cough.  CARDIOVASCULAR: No chest pain or palpitations.  GASTROINTESTINAL: No abdominal pain, nausea or vomiting.  GENITOURINARY: No dysuria, frequency or hematuria.  NEUROLOGICAL: No numbness or weakness  SKIN: No itching, rashes.      ICU Vital Signs Last 24 Hrs  T(C): 37.1 (13 Oct 2022 06:00), Max: 37.7 (12 Oct 2022 07:45)  T(F): 98.7 (13 Oct 2022 06:00), Max: 99.9 (12 Oct 2022 07:45)  HR: 126 (13 Oct 2022 07:00) (99 - 141)  BP: 123/82 (13 Oct 2022 07:00) (117/80 - 144/99)  BP(mean): 98 (13 Oct 2022 07:00) (89 - 115)  ABP: --  ABP(mean): --  RR: 21 (13 Oct 2022 07:00) (14 - 36)  SpO2: 94% (13 Oct 2022 07:00) (83% - 99%)    O2 Parameters below as of 13 Oct 2022 07:00  Patient On (Oxygen Delivery Method): nasal cannula  O2 Flow (L/min): 4    I&O's Summary    12 Oct 2022 07:01  -  13 Oct 2022 07:00  --------------------------------------------------------  IN: 440 mL / OUT: 925 mL / NET: -485 mL    CAPILLARY BLOOD GLUCOSE    CAPILLARY BLOOD GLUCOSE      PHYSICAL EXAM:  GENERAL: No acute distress, well-developed.  HEAD:  Atraumatic, Normocephalic  EYES: EOMI, PERRLA, conjunctiva and sclera clear.  NECK: Supple, no lymphadenopathy.  CHEST/LUNG: respirations even and unlabored. crackles bilaterally to lower lung fields  HEART: Regular rhythm, tachycardic. Normal S1/S2.   ABDOMEN: Soft, non-tender, non-distended; normal bowel sounds.  EXTREMITIES:  2+ peripheral pulses b/l, +2 lower extremity edema bilaterally. skin warm and well perfused.  NEUROLOGY: A&O x 3, no focal deficits.  SKIN: No rashes or lesions.     ============================I/O===========================   I&O's Detail    12 Oct 2022 07:01  -  13 Oct 2022 07:00  --------------------------------------------------------  IN:    IV PiggyBack: 200 mL    Oral Fluid: 240 mL  Total IN: 440 mL    OUT:    Indwelling Catheter - Urethral (mL): 925 mL  Total OUT: 925 mL    Total NET: -485 mL        ============================ LABS =========================                        10.1   9.55  )-----------( 256      ( 13 Oct 2022 02:14 )             32.7     10-    143  |  108  |  14  ----------------------------<  85  4.2   |  21<L>  |  0.60    Ca    8.1<L>      13 Oct 2022 03:00  Phos  2.7     10-13  Mg     1.9     10-13    TPro  6.1  /  Alb  3.2<L>  /  TBili  0.3  /  DBili  x   /  AST  31  /  ALT  36  /  AlkPhos  122<H>  10-13  Troponin T, High Sensitivity Result: 59 ng/L (10-13-22 @ 03:00)    LIVER FUNCTIONS - ( 13 Oct 2022 03:00 )  Alb: 3.2 g/dL / Pro: 6.1 g/dL / ALK PHOS: 122 U/L / ALT: 36 U/L / AST: 31 U/L / GGT: x           PT/INR - ( 11 Oct 2022 23:20 )   PT: 11.3 sec;   INR: 0.95 ratio    PTT - ( 11 Oct 2022 23:20 )  PTT:35.3 sec    Lactate, Blood: 1.4 mmol/L (10-13-22 @ 02:14)  Lactate, Blood: 2.0 mmol/L (10-12-22 @ 01:32)    Urinalysis Basic - ( 12 Oct 2022 01:32 )    Color: Yellow / Appearance: Clear / S.005 / pH: x  Gluc: x / Ketone: Negative  / Bili: Negative / Urobili: Negative   Blood: x / Protein: Negative / Nitrite: Negative   Leuk Esterase: Negative / RBC: 0-2 /HPF / WBC 0-2 /HPF   Sq Epi: x / Non Sq Epi: Few /HPF / Bacteria: Trace /HPF      ======================Micro/Rad/Cardio=================  Telemtry: Reviewed   EKG: Reviewed  CXR: Reviewed  Culture: Reviewed   Echo:   Cath:  ======================================================  PAST MEDICAL & SURGICAL HISTORY:  Gestational diabetes  Gestational hypertension  H/O  section        ====================ASSESSMENT AND PLAN==============  36F hx gestational diabetes, gestational hypertension, chronic anemia, recent  10/4/2022 who presented with SOB 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       ====================== NEUROLOGY=====================  - A&O x3, mildly anxious    ==================== RESPIRATORY======================  #Acute hypoxic respiratory failure   - pt at OSH with SpO2 80%   - weaned from Bipap to 4L NC with 02> 94%  - lasix given in am  - continue to wean as tolerated    ====================CARDIOVASCULAR==================  #Postpartum cardiomyopathy   - EF at OSH 15-20%  - ProBNP elevated at 7K   - no evidence of end organ dysfunction, lactate negative       - Echo:   1. Tethered mitral valve leaflets. Mild mitral regurgitation.  2. Moderately dilated left atrium.  LA volume index = 42cc/m2.  3. Normal left ventricular internal dimensions and wall thicknesses.  4. Severe global left ventricular systolic dysfunction.  5. Grade III-IV  diastolic dysfunction (severe).  6. Normal right ventricular size and function.  7. Bilateral pleural effusions.    #HTN  - hydralazine increased to 20mg TID        ==================== GASTROINTESTINAL===================  - no active issues  - diet advanced    Diet:  Last BM:   Indication for Stress Ulcer Prophylaxis, [ ] Yes    [ ] No   If Yes, Medication:     pantoprazole    Tablet 40 milliGRAM(s) Oral before breakfast      ===================== RENAL =========================  - no active issues  - am Cr 0.6, stable  - lasix 40 mg given in am, responding appropriately        10-12-22 @ 07:01  -  10-13-22 @ 07:00  --------------------------------------------------------  IN: 440 mL / OUT: 925 mL / NET: -485 mL    =======================    ENDOCRINE  =====================  #gestational diabetes  - diet controlled outpatient  - glucose on BMP within normal limits  - follow up A1c      ========================INFECTIOUS DISEASE================  - no active issues, afebrile  - blood cultures no growth to date      Culture - Blood (collected 10-12-22 @ 01:15)  Source: .Blood Blood-Peripheral  Preliminary Report (10-13-22 @ 07:02):    No growth to date.    Culture - Blood (collected 10-12-22 @ 01:10)  Source: .Blood Blood-Peripheral  Preliminary Report (10-13-22 @ 07:02):    No growth to date.      ===================HEMATOLOGIC/ONC ===================  #chronic anemia, stable  - H/H 10.1/32.7 in am  - continue to monitor    enoxaparin Injectable 40 milliGRAM(s) SubCutaneous every 24 hours    ======================= LINES/TUBES  =====================    Carrion: placed 10/12 @ OSH    ======================= Disposition  =====================       RANDY JACOBS  MRN-[id number]  Patient is a 36y old  Female who presents with a chief complaint of Postpartum Cardiomyopathy (13 Oct 2022 02:08)    HPI:  Patient is a 35 yo Female, hx Gestational DM (diet controlled), Gestational HTN (did not require meds), recent LSCS 10/4/2022, Chronic anemia, presenting with gradual progressive shortness of breath and bilateral lower extremity swelling since returning home after LSCS. Pt's O2 saturation at home was recorded to be 80%, hence patient was brought to the ED. Pt currently on Bipap, reports no chest pain, fevers, abdominal pain, nausea, vomiting, dysuria. Pt only taking vitamins at home.     In the ED, vitals were  Temp 99.2, -130s, /86, RR 26, 92% RA> given tachypnea and tachycardia> transitioned to Bipap  RR 12, FiO2 100% saturating 99%  Xray chest pulm edema s/p 20 mg IV Lasix x 2 doses> 2500cc Carrion output, started on low dose Nitro drip, titrate to     Pt was at [facility] where echo revealed acutely reduced EF 15-20%. Pt was diuresed with Lasix 20 x2 and weaned off Bipap. She has remained on 6L NC with acceptable O2. She was transferred to [facility] CICU for continued management.  (13 Oct 2022 02:08)      24 HOUR EVENTS: transferred to CICU for heart failure management.     REVIEW OF SYSTEMS:  CONSTITUTIONAL: No weakness, fevers or chills.  RESPIRATORY: Reports shortness of breath on exertion. No cough.  CARDIOVASCULAR: No chest pain or palpitations.  GASTROINTESTINAL: No abdominal pain, nausea or vomiting.  GENITOURINARY: No dysuria, frequency or hematuria.  NEUROLOGICAL: No numbness or weakness  SKIN: No itching, rashes.      ICU Vital Signs Last 24 Hrs  T(C): 37.1 (13 Oct 2022 06:00), Max: 37.7 (12 Oct 2022 07:45)  T(F): 98.7 (13 Oct 2022 06:00), Max: 99.9 (12 Oct 2022 07:45)  HR: 126 (13 Oct 2022 07:00) (99 - 141)  BP: 123/82 (13 Oct 2022 07:00) (117/80 - 144/99)  BP(mean): 98 (13 Oct 2022 07:00) (89 - 115)  ABP: --  ABP(mean): --  RR: 21 (13 Oct 2022 07:00) (14 - 36)  SpO2: 94% (13 Oct 2022 07:00) (83% - 99%)    O2 Parameters below as of 13 Oct 2022 07:  Patient On (Oxygen Delivery Method): nasal cannula  O2 Flow (L/min): 4    I&O's Summary    12 Oct 2022 07:  -  13 Oct 2022 07:00  --------------------------------------------------------  IN: 440 mL / OUT: 925 mL / NET: -485 mL    CAPILLARY BLOOD GLUCOSE    CAPILLARY BLOOD GLUCOSE      PHYSICAL EXAM:  GENERAL: No acute distress, well-developed.  HEAD:  Atraumatic, Normocephalic  EYES: EOMI, PERRLA, conjunctiva and sclera clear.  NECK: Supple, no lymphadenopathy.  CHEST/LUNG: respirations even and unlabored. crackles bilaterally to lower lung fields.  HEART: Regular rhythm, tachycardic. Normal S1/S2.   ABDOMEN: Soft, non-tender, non-distended; normal bowel sounds.  EXTREMITIES:  2+ peripheral pulses b/l, +2 lower extremity edema bilaterally. skin warm and well perfused.  NEUROLOGY: A&O x 3, no focal deficits.  SKIN: No rashes or lesions. c section incision    ============================I/O===========================   I&O's Detail    12 Oct 2022 07:  -  13 Oct 2022 07:00  --------------------------------------------------------  IN:    IV PiggyBack: 200 mL    Oral Fluid: 240 mL  Total IN: 440 mL    OUT:    Indwelling Catheter - Urethral (mL): 925 mL  Total OUT: 925 mL    Total NET: -485 mL        ============================ LABS =========================                        10.1   9.55  )-----------( 256      ( 13 Oct 2022 02:14 )             32.7     10-13    143  |  108  |  14  ----------------------------<  85  4.2   |  21<L>  |  0.60    Ca    8.1<L>      13 Oct 2022 03:00  Phos  2.7     10-13  Mg     1.9     10-13    TPro  6.1  /  Alb  3.2<L>  /  TBili  0.3  /  DBili  x   /  AST  31  /  ALT  36  /  AlkPhos  122<H>  10-13  Troponin T, High Sensitivity Result: 59 ng/L (10-13-22 @ 03:00)    LIVER FUNCTIONS - ( 13 Oct 2022 03:00 )  Alb: 3.2 g/dL / Pro: 6.1 g/dL / ALK PHOS: 122 U/L / ALT: 36 U/L / AST: 31 U/L / GGT: x           PT/INR - ( 11 Oct 2022 23:20 )   PT: 11.3 sec;   INR: 0.95 ratio    PTT - ( 11 Oct 2022 23:20 )  PTT:35.3 sec    Lactate, Blood: 1.4 mmol/L (10-13-22 @ 02:14)  Lactate, Blood: 2.0 mmol/L (10-12-22 @ 01:32)    Urinalysis Basic - ( 12 Oct 2022 01:32 )    Color: Yellow / Appearance: Clear / S.005 / pH: x  Gluc: x / Ketone: Negative  / Bili: Negative / Urobili: Negative   Blood: x / Protein: Negative / Nitrite: Negative   Leuk Esterase: Negative / RBC: 0-2 /HPF / WBC 0-2 /HPF   Sq Epi: x / Non Sq Epi: Few /HPF / Bacteria: Trace /HPF      ======================Micro/Rad/Cardio=================  Telemtry: Reviewed   EKG: Reviewed  CXR: Reviewed  Culture: Reviewed   Echo:   Cath:  ======================================================  PAST MEDICAL & SURGICAL HISTORY:  Gestational diabetes  Gestational hypertension  H/O  section        ====================ASSESSMENT AND PLAN==============  36F hx gestational diabetes, gestational hypertension, chronic anemia, recent  10/4/2022 who presented with SOB 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       ====================== NEUROLOGY=====================  - A&O x3, mildly anxious  - continue to monitor neurological status    ==================== RESPIRATORY======================  #Acute hypoxic respiratory failure   - pt at OSH with SpO2 80%   - weaned from Bipap to 4L NC with 02> 94% with improving dsypnea  - continue to wean oxygen as tolerated  - fluid overloaded on exam, lasix given in am, will monitor for response    ====================CARDIOVASCULAR==================  #HFrEF, Postpartum cardiomyopathy   - EF at OSH 15-20%  - ProBNP elevated at 7K, troponin downtrending  - no evidence of end organ dysfunction, lactate negative   - sinus tachycardia   - hydralazine increased to 20mg TID    - Echo:   1. Tethered mitral valve leaflets. Mild mitral regurgitation.  2. Moderately dilated left atrium.  LA volume index = 42cc/m2.  3. Normal left ventricular internal dimensions and wall thicknesses.  4. Severe global left ventricular systolic dysfunction.  5. Grade III-IV  diastolic dysfunction (severe).  6. Normal right ventricular size and function.  7. Bilateral pleural effusions.        ==================== GASTROINTESTINAL===================  - no active issues  - diet advanced  - stool softener to be restarted    Diet:  Last BM:   Indication for Stress Ulcer Prophylaxis, [ ] Yes    [ ] No   If Yes, Medication:     pantoprazole    Tablet 40 milliGRAM(s) Oral before breakfast      ===================== RENAL =========================  - no active issues  - am Cr 0.6, stable  - lasix 40 mg given in am, responding appropriately  - monitor electrolytes, follow up afternoon BMP        10-12-22 @ 07:01  -  10-13-22 @ 07:00  --------------------------------------------------------  IN: 440 mL / OUT: 925 mL / NET: -485 mL    =======================    ENDOCRINE  =====================  #gestational diabetes  - diet controlled outpatient  - glucose on BMP within normal limits  - follow up A1c      ========================INFECTIOUS DISEASE================  - no active issues, afebrile  - blood cultures no growth to date      Culture - Blood (collected 10-12-22 @ 01:15)  Source: .Blood Blood-Peripheral  Preliminary Report (10-13-22 @ 07:02):    No growth to date.    Culture - Blood (collected 10-12-22 @ 01:10)  Source: .Blood Blood-Peripheral  Preliminary Report (10-13-22 @ 07:02):    No growth to date.      ===================HEMATOLOGIC/ONC ===================  #chronic anemia, stable  - H/H 10.1/32.7 in am, stable  - small amount of vaginal bleeding  - restart home dose ferrous sulfate  - continue to monitor daily H/H and for signs of bleeding    enoxaparin Injectable 40 milliGRAM(s) SubCutaneous every 24 hours    ======================= LINES/TUBES  =====================    Carrion: placed 10/12 @ OSH    ======================= Disposition  =====================

## 2022-10-13 NOTE — CONSULT NOTE ADULT - NS ATTEND AMEND GEN_ALL_CORE FT
37 y/o F with no significant past medical history, s/p delivery of her first child 9 days ago, admitted for shortness of breath, found to have reduced EF. Relatively uncomplicated pregnancy, Covid over the summer, diet controlled gestational diabetes, HTN, LE swelling in the final weeks of pregnancy. After hospital discharge, shortness of breath developed, patient unable to sleep due to orthopnea.   Given the timing of symptom onset, this is likely peripartum cardiomyopathy, though ventricular dilation may represent a more chronic process. Regardless, treatment is the same. She needs afterload reduction and neurohormonal blockade. If she continues to breastfeed, recommend GDMT with ACE inhibitor (Captopril, Enalapril preferred), B-blocker (Toprol XL preferred), Spironolactone. Once she completes breastfeeding, would change ACE inhibitor to ARNI and add SGLT2 inhibitor.   For now, continue aggressive diuresis. Monitor and replete electrolytes to keep K > 4 and Mag > 2.  Counseled patient and  on prognosis of PPCM, and medication options with/without breastfeeding. Discussed importance of birth control.

## 2022-10-13 NOTE — PROGRESS NOTE ADULT - ASSESSMENT
36 year old female with acute systolic heart failure.  Is very recently post partum with  delivery, new orthopnea, LE edema, SOB.  Echo with severe LV systolic dysfunction.  LV dimensions dilated to upper limit of normal.  Valves look normal.    # peripartum cardiomyopathy.     She is continuing to breast-feed and is pumping while in the CICU.     Patient's condition improving, O2 support decreased to 2LNC.     Consulted with MFM, there is no objection to initiating an ACE or ARB for optimizing medical treatment of cardiomyopathy   * Would prefer an ARB for easier transitioning to Entresto once she discontinues breast feeding her new born.       Would not start beta blockade despite tachycardia until patient is euvolemic      Would give her longer than 3 months to recover her ejection fraction.   36 year old female with acute systolic heart failure   delivery, new orthopnea, LE edema, SOB.  Echo with severe LV systolic dysfunction.  LV dimensions dilated to upper limit of normal.  Valves look normal.    # peripartum cardiomyopathy.     She is continuing to breast-feed and is pumping while in the CICU.     Patient's condition improving, O2 support decreased to 2LNC.     Consulted with MFM, there is no objection to initiating an ACE or ARB for optimizing medical treatment of cardiomyopathy   * Would prefer an ARB for easier transitioning to Entresto once she discontinues breast feeding her new born.       Would not start beta blockade despite tachycardia until patient is euvolemic      Would give her longer than 3 months to recover her ejection fraction.      Cardio OB following   36 year old female with acute systolic heart failure   delivery, new orthopnea, LE edema, SOB.  Echo with severe LV systolic dysfunction.  LV dimensions dilated to upper limit of normal.  Valves look normal.    # Peripartum Cardiomyopathy.       Patient currently breast-feeding and pumping while in the CICU.     Patient's condition improving, O2 support decreased to 2LNC.     Consulted with MFM, there is no objection to initiating an ACE or ARB for optimizing medical treatment for  cardiomyopathy   * Would prefer an ARB for easier transitioning to Entresto once she discontinues breast feeding her .       Would not start beta blockade despite tachycardia until patient is euvolemic      Would give her longer than 3 months to recover her ejection fraction.      Cardio OB following

## 2022-10-13 NOTE — CONSULT NOTE ADULT - PROBLEM SELECTOR RECOMMENDATION 2
- stop hydralazine  - start captopril 3.125 mg TID, hold for SBP < 90. Will start ACE for now as she's currently intending to breast feed, however discussed superiority of ARNI for treatment of her cardiomyopathy, which is contraindicated if she will be breast feeding. Should she decide not to breast feed will plan to transition to ARNI.  - start Toprol XL 12.5mg QHS  - SGLT2i contraindicated if breast feeding - stop hydralazine  - start captopril 3.125 mg TID, hold for SBP < 90. Will start ACE for now as she's currently intending to breast feed, however discussed superiority of ARNI for treatment of her cardiomyopathy, which is contraindicated if she will be breast feeding. Should she decide not to breast feed will plan to transition to ARNI.  - start Toprol XL 12.5mg QHS  - SGLT2i contraindicated if breast feeding  - discussed importance of birth control as another pregnancy prior to recovery of her cardiomyopathy would not be recommended - stop hydralazine  - start captopril 3.125 mg TID, hold for SBP < 90. Will start ACE for now as she's currently intending to breast feed, however discussed superiority of ARNI for treatment of her cardiomyopathy, which is contraindicated if she will be breast feeding. Should she decide not to breast feed will plan to transition to ARNI.  - prior to discharge, start Toprol XL 12.5mg QHS--OK to wait until more euvolemic  - SGLT2i contraindicated if breast feeding  - discussed importance of birth control as another pregnancy prior to recovery of her cardiomyopathy would not be recommended

## 2022-10-13 NOTE — H&P ADULT - NSHPLABSRESULTS_GEN_ALL_CORE
10.4   11.66 )-----------( 325      ( 12 Oct 2022 03:58 )             32.6       10-12    143  |  111<H>  |  11  ----------------------------<  82  4.4   |  24  |  0.60    Ca    7.8<L>      12 Oct 2022 21:28  Phos  2.6     10-12  Mg     2.0     10-12    TPro  6.4  /  Alb  2.9<L>  /  TBili  0.3  /  DBili  x   /  AST  27  /  ALT  55  /  AlkPhos  129<H>  10-12          Magnesium, Serum: 2.0 mg/dL (10-12-22 @ 03:58)  Phosphorus Level, Serum: 2.6 mg/dL (10-12-22 @ 03:58)          Urinalysis Basic - ( 12 Oct 2022 01:32 )    Color: Yellow / Appearance: Clear / S.005 / pH: x  Gluc: x / Ketone: Negative  / Bili: Negative / Urobili: Negative   Blood: x / Protein: Negative / Nitrite: Negative   Leuk Esterase: Negative / RBC: 0-2 /HPF / WBC 0-2 /HPF   Sq Epi: x / Non Sq Epi: Few /HPF / Bacteria: Trace /HPF        PT/INR - ( 11 Oct 2022 23:20 )   PT: 11.3 sec;   INR: 0.95 ratio         PTT - ( 11 Oct 2022 23:20 )  PTT:35.3 sec    Lactate Trend  10-12 @ 01:32 Lactate:2.0

## 2022-10-14 LAB
ANION GAP SERPL CALC-SCNC: 13 MMOL/L — SIGNIFICANT CHANGE UP (ref 5–17)
ANION GAP SERPL CALC-SCNC: 13 MMOL/L — SIGNIFICANT CHANGE UP (ref 5–17)
ANION GAP SERPL CALC-SCNC: 15 MMOL/L — SIGNIFICANT CHANGE UP (ref 5–17)
BUN SERPL-MCNC: 20 MG/DL — SIGNIFICANT CHANGE UP (ref 7–23)
BUN SERPL-MCNC: 20 MG/DL — SIGNIFICANT CHANGE UP (ref 7–23)
BUN SERPL-MCNC: 21 MG/DL — SIGNIFICANT CHANGE UP (ref 7–23)
CALCIUM SERPL-MCNC: 8.4 MG/DL — SIGNIFICANT CHANGE UP (ref 8.4–10.5)
CALCIUM SERPL-MCNC: 8.6 MG/DL — SIGNIFICANT CHANGE UP (ref 8.4–10.5)
CALCIUM SERPL-MCNC: 9 MG/DL — SIGNIFICANT CHANGE UP (ref 8.4–10.5)
CHLORIDE SERPL-SCNC: 103 MMOL/L — SIGNIFICANT CHANGE UP (ref 96–108)
CHLORIDE SERPL-SCNC: 103 MMOL/L — SIGNIFICANT CHANGE UP (ref 96–108)
CHLORIDE SERPL-SCNC: 104 MMOL/L — SIGNIFICANT CHANGE UP (ref 96–108)
CO2 SERPL-SCNC: 21 MMOL/L — LOW (ref 22–31)
CO2 SERPL-SCNC: 23 MMOL/L — SIGNIFICANT CHANGE UP (ref 22–31)
CO2 SERPL-SCNC: 23 MMOL/L — SIGNIFICANT CHANGE UP (ref 22–31)
CREAT SERPL-MCNC: 0.66 MG/DL — SIGNIFICANT CHANGE UP (ref 0.5–1.3)
CREAT SERPL-MCNC: 0.67 MG/DL — SIGNIFICANT CHANGE UP (ref 0.5–1.3)
CREAT SERPL-MCNC: 0.73 MG/DL — SIGNIFICANT CHANGE UP (ref 0.5–1.3)
EGFR: 110 ML/MIN/1.73M2 — SIGNIFICANT CHANGE UP
EGFR: 117 ML/MIN/1.73M2 — SIGNIFICANT CHANGE UP
EGFR: 117 ML/MIN/1.73M2 — SIGNIFICANT CHANGE UP
GLUCOSE SERPL-MCNC: 141 MG/DL — HIGH (ref 70–99)
GLUCOSE SERPL-MCNC: 83 MG/DL — SIGNIFICANT CHANGE UP (ref 70–99)
GLUCOSE SERPL-MCNC: 84 MG/DL — SIGNIFICANT CHANGE UP (ref 70–99)
HCT VFR BLD CALC: 35.3 % — SIGNIFICANT CHANGE UP (ref 34.5–45)
HGB BLD-MCNC: 11.2 G/DL — LOW (ref 11.5–15.5)
MAGNESIUM SERPL-MCNC: 1.9 MG/DL — SIGNIFICANT CHANGE UP (ref 1.6–2.6)
MAGNESIUM SERPL-MCNC: 2 MG/DL — SIGNIFICANT CHANGE UP (ref 1.6–2.6)
MAGNESIUM SERPL-MCNC: 2 MG/DL — SIGNIFICANT CHANGE UP (ref 1.6–2.6)
MCHC RBC-ENTMCNC: 29.3 PG — SIGNIFICANT CHANGE UP (ref 27–34)
MCHC RBC-ENTMCNC: 31.7 GM/DL — LOW (ref 32–36)
MCV RBC AUTO: 92.4 FL — SIGNIFICANT CHANGE UP (ref 80–100)
NRBC # BLD: 0 /100 WBCS — SIGNIFICANT CHANGE UP (ref 0–0)
PHOSPHATE SERPL-MCNC: 3.2 MG/DL — SIGNIFICANT CHANGE UP (ref 2.5–4.5)
PHOSPHATE SERPL-MCNC: 3.3 MG/DL — SIGNIFICANT CHANGE UP (ref 2.5–4.5)
PLATELET # BLD AUTO: 417 K/UL — HIGH (ref 150–400)
POTASSIUM SERPL-MCNC: 3.7 MMOL/L — SIGNIFICANT CHANGE UP (ref 3.5–5.3)
POTASSIUM SERPL-MCNC: 3.8 MMOL/L — SIGNIFICANT CHANGE UP (ref 3.5–5.3)
POTASSIUM SERPL-MCNC: 4.1 MMOL/L — SIGNIFICANT CHANGE UP (ref 3.5–5.3)
POTASSIUM SERPL-SCNC: 3.7 MMOL/L — SIGNIFICANT CHANGE UP (ref 3.5–5.3)
POTASSIUM SERPL-SCNC: 3.8 MMOL/L — SIGNIFICANT CHANGE UP (ref 3.5–5.3)
POTASSIUM SERPL-SCNC: 4.1 MMOL/L — SIGNIFICANT CHANGE UP (ref 3.5–5.3)
RBC # BLD: 3.82 M/UL — SIGNIFICANT CHANGE UP (ref 3.8–5.2)
RBC # FLD: 14.9 % — HIGH (ref 10.3–14.5)
SODIUM SERPL-SCNC: 139 MMOL/L — SIGNIFICANT CHANGE UP (ref 135–145)
SODIUM SERPL-SCNC: 139 MMOL/L — SIGNIFICANT CHANGE UP (ref 135–145)
SODIUM SERPL-SCNC: 140 MMOL/L — SIGNIFICANT CHANGE UP (ref 135–145)
TROPONIN T, HIGH SENSITIVITY RESULT: 87 NG/L — HIGH (ref 0–51)
WBC # BLD: 10.42 K/UL — SIGNIFICANT CHANGE UP (ref 3.8–10.5)
WBC # FLD AUTO: 10.42 K/UL — SIGNIFICANT CHANGE UP (ref 3.8–10.5)

## 2022-10-14 PROCEDURE — 99232 SBSQ HOSP IP/OBS MODERATE 35: CPT | Mod: GC

## 2022-10-14 PROCEDURE — 99231 SBSQ HOSP IP/OBS SF/LOW 25: CPT

## 2022-10-14 PROCEDURE — 99233 SBSQ HOSP IP/OBS HIGH 50: CPT

## 2022-10-14 RX ORDER — FUROSEMIDE 40 MG
40 TABLET ORAL DAILY
Refills: 0 | Status: DISCONTINUED | OUTPATIENT
Start: 2022-10-15 | End: 2022-10-15

## 2022-10-14 RX ORDER — POTASSIUM CHLORIDE 20 MEQ
20 PACKET (EA) ORAL
Refills: 0 | Status: COMPLETED | OUTPATIENT
Start: 2022-10-14 | End: 2022-10-14

## 2022-10-14 RX ORDER — ACETAMINOPHEN 500 MG
650 TABLET ORAL EVERY 6 HOURS
Refills: 0 | Status: DISCONTINUED | OUTPATIENT
Start: 2022-10-14 | End: 2022-10-16

## 2022-10-14 RX ADMIN — SENNA PLUS 2 TABLET(S): 8.6 TABLET ORAL at 22:42

## 2022-10-14 RX ADMIN — Medication 40 MILLIEQUIVALENT(S): at 00:51

## 2022-10-14 RX ADMIN — Medication 325 MILLIGRAM(S): at 13:57

## 2022-10-14 RX ADMIN — Medication 20 MILLIEQUIVALENT(S): at 09:10

## 2022-10-14 RX ADMIN — Medication 20 MILLIGRAM(S): at 06:26

## 2022-10-14 RX ADMIN — LOSARTAN POTASSIUM 25 MILLIGRAM(S): 100 TABLET, FILM COATED ORAL at 09:11

## 2022-10-14 RX ADMIN — Medication 40 MILLIGRAM(S): at 06:26

## 2022-10-14 RX ADMIN — ENOXAPARIN SODIUM 40 MILLIGRAM(S): 100 INJECTION SUBCUTANEOUS at 06:27

## 2022-10-14 RX ADMIN — Medication 20 MILLIGRAM(S): at 15:06

## 2022-10-14 RX ADMIN — Medication 20 MILLIEQUIVALENT(S): at 11:59

## 2022-10-14 RX ADMIN — Medication 40 MILLIGRAM(S): at 13:47

## 2022-10-14 RX ADMIN — Medication 20 MILLIGRAM(S): at 22:42

## 2022-10-14 NOTE — PROGRESS NOTE ADULT - SUBJECTIVE AND OBJECTIVE BOX
C A R D I O L O G Y  **********************************     DATE OF SERVICE: 10-14-22    Patient denies chest pain or shortness of breath currently on room air at time of exam.   Review of symptoms otherwise negative.    MEDICATIONS:  acetaminophen     Tablet .. 650 milliGRAM(s) Oral every 6 hours PRN  enoxaparin Injectable 40 milliGRAM(s) SubCutaneous every 24 hours  ferrous    sulfate 325 milliGRAM(s) Oral daily  furosemide   Injectable 40 milliGRAM(s) IV Push two times a day  hydrALAZINE 20 milliGRAM(s) Oral three times a day  losartan 25 milliGRAM(s) Oral daily  ondansetron Injectable 4 milliGRAM(s) IV Push four times a day PRN  pantoprazole    Tablet 40 milliGRAM(s) Oral before breakfast PRN  potassium chloride    Tablet ER 20 milliEquivalent(s) Oral every 2 hours  senna 2 Tablet(s) Oral at bedtime      LABS:                        11.2   10.42 )-----------( 417      ( 14 Oct 2022 07:16 )             35.3       Hemoglobin: 11.2 g/dL (10-14 @ 07:16)  Hemoglobin: 10.1 g/dL (10-13 @ 02:14)  Hemoglobin: 10.4 g/dL (10-12 @ 03:58)  Hemoglobin: 10.9 g/dL (10-11 @ 23:20)      10-14    140  |  104  |  21  ----------------------------<  83  4.1   |  21<L>  |  0.67    Ca    8.4      14 Oct 2022 07:18  Phos  3.3     10-14  Mg     2.0     10-14    TPro  6.1  /  Alb  3.2<L>  /  TBili  0.3  /  DBili  x   /  AST  31  /  ALT  36  /  AlkPhos  122<H>  10-13    Creatinine Trend: 0.67<--, 0.66<--, 0.70<--, 0.60<--, 0.60<--, 0.49<--    COAGS:           PHYSICAL EXAM:  T(C): 36.8 (10-14-22 @ 04:47), Max: 36.9 (10-13-22 @ 22:22)  HR: 132 (10-14-22 @ 09:01) (112 - 134)  BP: 102/71 (10-14-22 @ 09:01) (102/71 - 124/86)  RR: 18 (10-14-22 @ 04:47) (18 - 24)  SpO2: 95% (10-14-22 @ 04:47) (95% - 96%)  Wt(kg): --    I&O's Summary    13 Oct 2022 07:01  -  14 Oct 2022 07:00  --------------------------------------------------------  IN: 240 mL / OUT: 1055 mL / NET: -815 mL        Gen: NAD  HEENT:  (-)icterus (-)pallor  CV: N S1 S2 1/6 LEEANNE (+)2 Pulses B/l  Resp:  Clear to auscultation B/L, normal effort  GI: (+) BS Soft, NT, ND  Lymph:  (-) Edema, (-)obvious lymphadenopathy  Skin: Warm to touch, Normal turgor  Psych: Appropriate mood and affect      TELEMETRY: -130      < from: Transthoracic Echocardiogram (10.12.22 @ 08:02) >  Ejection Fraction Visual Estimate: 15-20 %  CONCLUSIONS:  1. Tethered mitral valve leaflets. Mild mitral  regurgitation.  2. Moderately dilated left atrium.  LA volume index = 42  cc/m2.  3. Normal left ventricular internal dimensions and wall  thicknesses.  4. Severe global left ventricular systolic dysfunction.  5. Grade III-IV  diastolic dysfunction(severe).  6. Normal right ventricular size and function.  7. Bilateral pleural effusions.    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 Transferred to [facility] CCU for further management now downgraded to telemetry floor.    - Keep net negative with IV lasix, strict I/Os - volume status improving  - Wean O2 as tolerated  - Maximize afterload reducers - Continue hydralazine. Started on ARB per Cardio OB  - Eventual beta blocker when euvolemic  - EP eval appreciated  - Follow up HF recs    Jeff Garcia PA-C  Pager: [phone]

## 2022-10-14 NOTE — PROGRESS NOTE ADULT - ASSESSMENT
35 y/o F PMH Gestational DM (diet controlled) and gestational HTN (not on meds) presented from [facility] for further management of ADHF (EF 15%) of unknown etiology most c/f Postpartum cardiomyopathy with volume overload s/p IV Lasix 40 x2. Respiratory status improving, now on 2 L NC. Cardiology, OB Cardiology, EP following.  37 y/o F PMH Gestational DM (diet controlled) and gestational HTN (not on meds) presented from [facility] for further management of ADHF (EF 15%) of unknown etiology most c/f Postpartum cardiomyopathy with volume overload s/p IV Lasix 40 x2. Respiratory status improving, now on room air. Cardiology, OB Cardiology, EP following.

## 2022-10-14 NOTE — PROGRESS NOTE ADULT - NS ATTEND AMEND GEN_ALL_CORE FT
36yrs. no PMH.   delivered first child 10.4 elective C section (? reason) . diet controlled gest diabetes and gest HTN.   LE swelling final weeks preg. post d/c SOB orthopnea.   admitted 10.13. TTE 10.12 LA 4.3, LVEDD 5.6, LVEF 15-20. normal RV. mild MR. mild TR.   managed with IV diuretics lasix 40 IV bid   no standing weights.   meds: lasix 40 IV bid, HDZN 20 tid, losartan 25 QD. no BB  HR -130, 102/-119/   -800  10-14  140  |  104  |  21  ----------------------------<  83  4.1   |  21<L>  |  0.67                        11.2   10.42 )-----------( 417      ( 14 Oct 2022 07:16 )             35.3     Ca    8.4      14 Oct 2022 07:18  Phos  3.3     10-14  Mg     2.0     10-14  TPro  6.1  /  Alb  3.2<L>  /  TBili  0.3  /  DBili  x   /  AST  31  /  ALT  36  /  AlkPhos  122<H>  10-13 36yrs. no PMH.   delivered first child 10.4 elective C section (? reason) . diet controlled gest diabetes and gest HTN.   LE swelling final weeks preg. post d/c SOB orthopnea.   admitted 10.13. TTE 10.12 LA 4.3, LVEDD 5.6, LVEF 15-20. normal RV. mild MR. mild TR.   managed with IV diuretics lasix 40 IV bid   no standing weights.   meds: lasix 40 IV bid, HDZN 20 tid, losartan 25 QD. no BB  HR -130, 102/-119/   no JVP. lungs clear. no LE edema.   -800  10-14  140  |  104  |  21  ----------------------------<  83  4.1   |  21<L>  |  0.67                        11.2   10.42 )-----------( 417      ( 14 Oct 2022 07:16 )             35.3     Ca    8.4      14 Oct 2022 07:18  Phos  3.3     10-14  Mg     2.0     10-14  TPro  6.1  /  Alb  3.2<L>  /  TBili  0.3  /  DBili  x   /  AST  31  /  ALT  36  /  AlkPhos  122<H>  10-13    likely peripartum CMP. note mild LV dilation. COVID injury also possible.  discussed with patient and primary team. ACE-I have a more favorable profile for breastfeeding than losartan   Suggest:  d/c IV lasix  lasix 40 PO QD from tomorrow.  d/c losartan. d/c HDZN from tomorrow.   start enalarpril 7.5 bid.   start toprol 25 middle of the day   possible d/c 10.16 for f/u Dr Mederos next week  Nikko Hauser

## 2022-10-14 NOTE — PROVIDER CONTACT NOTE (OTHER) - BACKGROUND
35 y/o F PMH Gestational DM (diet controlled) and gestational HTN (not on meds) presented from [facility] for further management of ADHF (EF 15%)

## 2022-10-14 NOTE — PROGRESS NOTE ADULT - SUBJECTIVE AND OBJECTIVE BOX
INTERVAL HPI/OVERNIGHT EVENTS:    SUBJECTIVE: Patient seen and examined at bedside.    OBJECTIVE:    VITAL SIGNS:  ICU Vital Signs Last 24 Hrs  T(C): 36.8 (14 Oct 2022 04:47), Max: 36.9 (13 Oct 2022 22:22)  T(F): 98.2 (14 Oct 2022 04:47), Max: 98.4 (13 Oct 2022 22:22)  HR: 118 (14 Oct 2022 04:47) (112 - 134)  BP: 119/85 (14 Oct 2022 04:47) (113/79 - 134/91)  BP(mean): 94 (13 Oct 2022 13:00) (92 - 107)  ABP: --  ABP(mean): --  RR: 18 (14 Oct 2022 04:47) (15 - 24)  SpO2: 95% (14 Oct 2022 04:47) (93% - 96%)    O2 Parameters below as of 14 Oct 2022 04:47  Patient On (Oxygen Delivery Method): nasal cannula  O2 Flow (L/min): 2            10-13 @ 07:01  -  10-14 @ 07:00  --------------------------------------------------------  IN: 240 mL / OUT: 1055 mL / NET: -815 mL      CAPILLARY BLOOD GLUCOSE          PHYSICAL EXAM:    General: NAD  HEENT: NC/AT; PERRL, clear conjunctiva  Neck: supple  Respiratory: CTA b/l  Cardiovascular: +S1/S2; RRR  Abdomen: soft, NT/ND; +BS x4  Extremities:  no LE edema  Vascular: WWP, 2+ peripheral pulses b/l;  Skin: normal color and turgor; no rash  Neurological: A&Ox3, move all extremities. CN II-XII intact    MEDICATIONS:  MEDICATIONS  (STANDING):  enoxaparin Injectable 40 milliGRAM(s) SubCutaneous every 24 hours  ferrous    sulfate 325 milliGRAM(s) Oral daily  furosemide   Injectable 40 milliGRAM(s) IV Push two times a day  hydrALAZINE 20 milliGRAM(s) Oral three times a day  losartan 25 milliGRAM(s) Oral daily  potassium chloride    Tablet ER 20 milliEquivalent(s) Oral every 2 hours  senna 2 Tablet(s) Oral at bedtime    MEDICATIONS  (PRN):  ondansetron Injectable 4 milliGRAM(s) IV Push four times a day PRN Nausea and/or Vomiting  pantoprazole    Tablet 40 milliGRAM(s) Oral before breakfast PRN acid reflux      ALLERGIES:  Allergies    No Known Allergies    Intolerances        LABS:                        11.2   10.42 )-----------( 417      ( 14 Oct 2022 07:16 )             35.3     10-14    139  |  103  |  20  ----------------------------<  84  3.7   |  23  |  0.66    Ca    8.6      14 Oct 2022 00:55  Phos  2.7     10-13  Mg     2.0     10-14    TPro  6.1  /  Alb  3.2<L>  /  TBili  0.3  /  DBili  x   /  AST  31  /  ALT  36  /  AlkPhos  122<H>  10-13          RADIOLOGY & ADDITIONAL TESTS: Reviewed. INTERVAL HPI/OVERNIGHT EVENTS: SHAHANA OVN.    SUBJECTIVE: Patient seen and examined at bedside. This AM patient stated she was feeling well without oxygen and is eager to go home to her baby. Denies SOB, chest pain, palpitations, lower extremity swelling. Endorses lower abdominal pain attributed to post op pain.    OBJECTIVE:    VITAL SIGNS:  ICU Vital Signs Last 24 Hrs  T(C): 36.8 (14 Oct 2022 04:47), Max: 36.9 (13 Oct 2022 22:22)  T(F): 98.2 (14 Oct 2022 04:47), Max: 98.4 (13 Oct 2022 22:22)  HR: 118 (14 Oct 2022 04:47) (112 - 134)  BP: 119/85 (14 Oct 2022 04:47) (113/79 - 134/91)  BP(mean): 94 (13 Oct 2022 13:00) (92 - 107)  ABP: --  ABP(mean): --  RR: 18 (14 Oct 2022 04:47) (15 - 24)  SpO2: 95% (14 Oct 2022 04:47) (93% - 96%)    O2 Parameters below as of 14 Oct 2022 04:47  Patient On (Oxygen Delivery Method): nasal cannula  O2 Flow (L/min): 2            10-13 @ 07:01  -  10-14 @ 07:00  --------------------------------------------------------  IN: 240 mL / OUT: 1055 mL / NET: -815 mL      CAPILLARY BLOOD GLUCOSE          PHYSICAL EXAM:    General: NAD, off O2 this AM  HEENT: NC/AT; PERRL, clear conjunctiva  Neck: supple  Respiratory: Crackles heard bilaterally, improved since yesterday  Cardiovascular: +S1/S2; Tachycardic, regular rhythm  Abdomen: soft, mildly tender to palpation near incisional site, ND; +BS x4  Extremities:  no LE edema  Vascular: WWP, 2+ peripheral pulses b/l;  Skin: normal color and turgor; no rash  Neurological: A&Ox3, move all extremities.     MEDICATIONS:  MEDICATIONS  (STANDING):  enoxaparin Injectable 40 milliGRAM(s) SubCutaneous every 24 hours  ferrous    sulfate 325 milliGRAM(s) Oral daily  furosemide   Injectable 40 milliGRAM(s) IV Push two times a day  hydrALAZINE 20 milliGRAM(s) Oral three times a day  losartan 25 milliGRAM(s) Oral daily  potassium chloride    Tablet ER 20 milliEquivalent(s) Oral every 2 hours  senna 2 Tablet(s) Oral at bedtime    MEDICATIONS  (PRN):  ondansetron Injectable 4 milliGRAM(s) IV Push four times a day PRN Nausea and/or Vomiting  pantoprazole    Tablet 40 milliGRAM(s) Oral before breakfast PRN acid reflux      ALLERGIES:  Allergies    No Known Allergies    Intolerances        LABS:                        11.2   10.42 )-----------( 417      ( 14 Oct 2022 07:16 )             35.3     10-14    139  |  103  |  20  ----------------------------<  84  3.7   |  23  |  0.66    Ca    8.6      14 Oct 2022 00:55  Phos  2.7     10-13  Mg     2.0     10-14    TPro  6.1  /  Alb  3.2<L>  /  TBili  0.3  /  DBili  x   /  AST  31  /  ALT  36  /  AlkPhos  122<H>  10-13          RADIOLOGY & ADDITIONAL TESTS: Reviewed.

## 2022-10-14 NOTE — PROGRESS NOTE ADULT - SUBJECTIVE AND OBJECTIVE BOX
Subjective:      Medications:  acetaminophen     Tablet .. 650 milliGRAM(s) Oral every 6 hours PRN  enoxaparin Injectable 40 milliGRAM(s) SubCutaneous every 24 hours  ferrous    sulfate 325 milliGRAM(s) Oral daily  furosemide   Injectable 40 milliGRAM(s) IV Push two times a day  hydrALAZINE 20 milliGRAM(s) Oral three times a day  losartan 25 milliGRAM(s) Oral daily  ondansetron Injectable 4 milliGRAM(s) IV Push four times a day PRN  pantoprazole    Tablet 40 milliGRAM(s) Oral before breakfast PRN  potassium chloride    Tablet ER 20 milliEquivalent(s) Oral every 2 hours  senna 2 Tablet(s) Oral at bedtime      ICU Vital Signs Last 24 Hrs  T(C): 36.8, Max: 36.9 (10-13 @ 22:22)  HR: 132 (112 - 134)  BP: 102/71 (102/71 - 133/88)  BP(mean): 94 (92 - 106)  ABP: --  ABP(mean): --  RR: 18 (18 - 24)  SpO2: 95% (94% - 96%)    Weight in k.1 (10-12-22)      I&O's Summary Last 24 Hrs    IN: 240 mL / OUT: 1055 mL / NET: -815 mL      Tele:    Physical Exam:    General: No distress. Comfortable.  HEENT: EOM intact.  Neck: JVP not elevated.  Respiratory: Clear to auscultation bilaterally  CV: RRR. Normal S1 and S2. No murmurs, rub, or gallops. Radial pulses normal.  Abdomen: Soft, non-distended, non-tender  Skin: No skin lesions  Extremities: Warm, no edema  Neurology: Non-focal, alert and oriented times three.   Psych: Affect normal    Labs:    ( 10-14-22 @ 07:16 )               11.2   10.42 )--------( 417                  35.3     ( 10-14-22 @ 07:18 )     140  |  104  |  21  ---------------------<  83  4.1  |  21  |  0.67    Ca 8.4  Phos 3.3  Mg 2.0    ( 10-13-22 @ 03:00 )  TPro  6.1  /  Alb  3.2  /  TBili  0.3  /  DBili  x   /  AST  31  /  ALT  36  /  AlkPhos  122  ( 10-12-22 @ 03:58 )  TPro  6.4  /  Alb  2.9  /  TBili  0.3  /  DBili  x   /  AST  27  /  ALT  55  /  AlkPhos  129      ( 10-14-22 @ 00:55 )  TropHS 87    / CK x     / CKMB x      ( 10-13-22 @ 18:00 )  TropHS 86    / CK x     / CKMB x        Serum Pro-Brain Natriuretic Peptide: 7113 (10-11-22 @ 23:20)            Lactate Dehydrogenase, Serum: 412 (10-11-22 @ 23:20)   Subjective:  - NAEO  - States she feels well this morning without any SOB, orthopnea, PND, LH/dizziness    Medications:  acetaminophen     Tablet .. 650 milliGRAM(s) Oral every 6 hours PRN  enoxaparin Injectable 40 milliGRAM(s) SubCutaneous every 24 hours  ferrous    sulfate 325 milliGRAM(s) Oral daily  furosemide   Injectable 40 milliGRAM(s) IV Push two times a day  hydrALAZINE 20 milliGRAM(s) Oral three times a day  losartan 25 milliGRAM(s) Oral daily  ondansetron Injectable 4 milliGRAM(s) IV Push four times a day PRN  pantoprazole    Tablet 40 milliGRAM(s) Oral before breakfast PRN  potassium chloride    Tablet ER 20 milliEquivalent(s) Oral every 2 hours  senna 2 Tablet(s) Oral at bedtime      ICU Vital Signs Last 24 Hrs  T(C): 36.8, Max: 36.9 (10-13 @ 22:22)  HR: 132 (112 - 134)  BP: 102/71 (102/71 - 133/88)  BP(mean): 94 (92 - 106)  ABP: --  ABP(mean): --  RR: 18 (18 - 24)  SpO2: 95% (94% - 96%)    Weight in k.1 (10-12-22)      I&O's Summary Last 24 Hrs    IN: 240 mL / OUT: 1055 mL / NET: -815 mL      Tele: -130s    Physical Exam:    General: No distress. Comfortable.  HEENT: EOM intact.  Neck: JVP not elevated.  Respiratory: Clear to auscultation bilaterally  CV: Regular, tachycardic. Normal S1 and S2. No murmurs, rub, or gallops. Radial pulses normal.  Abdomen: Soft, non-distended, non-tender  Skin: No skin lesions  Extremities: Warm, no edema  Neurology: Non-focal, alert and oriented times three.   Psych: Affect normal    Labs:    ( 10-14-22 @ 07:16 )               11.2   10.42 )--------( 417                  35.3     ( 10-14-22 @ 07:18 )     140  |  104  |  21  ---------------------<  83  4.1  |  21  |  0.67    Ca 8.4  Phos 3.3  Mg 2.0    ( 10-13-22 @ 03:00 )  TPro  6.1  /  Alb  3.2  /  TBili  0.3  /  DBili  x   /  AST  31  /  ALT  36  /  AlkPhos  122  ( 10-12-22 @ 03:58 )  TPro  6.4  /  Alb  2.9  /  TBili  0.3  /  DBili  x   /  AST  27  /  ALT  55  /  AlkPhos  129    ( 10-14-22 @ 00:55 )  TropHS 87    / CK x     / CKMB x      ( 10-13-22 @ 18:00 )  TropHS 86    / CK x     / CKMB x        Serum Pro-Brain Natriuretic Peptide: 7113 (10-11-22 @ 23:20)    Lactate Dehydrogenase, Serum: 412 (10-11-22 @ 23:20)

## 2022-10-14 NOTE — PROGRESS NOTE ADULT - SUBJECTIVE AND OBJECTIVE BOX
R3 Antepartum Note    HD#3    Patient seen and examined at bedside, no acute overnight events. No acute complaints. Reports significant improvement in SOB, and is now weaned off supplemental O2. Denies HA, epigastric pain, blurred vision, CP, SOB, N/V, fevers, and chills.    Vital Signs Last 24 Hours  T(C): 36.8 (10-14-22 @ 04:47), Max: 36.9 (10-13-22 @ 22:22)  HR: 132 (10-14-22 @ 09:01) (112 - 134)  BP: 102/71 (10-14-22 @ 09:01) (102/71 - 124/86)  RR: 18 (10-14-22 @ 04:47) (18 - 24)  SpO2: 95% (10-14-22 @ 04:47) (95% - 96%)    Physical Exam:  General: NAD  Abdomen: Soft, appropriately tender, fundus firm and below umbilicus, pfannenstiel incision c/d/i  : lochia wnl, pham in place draining clear yellow urine  Ext: No pain, b/l pedal edema    Labs:             11.2   10.42 )-----------( 417      ( 10-14 @ 07:16 )             35.3     10-14 @ 07:18    140  |  104  |  21  ----------------------------<  83  4.1   |  21  |  0.67    Ca    8.4      10-14 @ 07:18  Phos  3.3     10-14 @ 07:18  Mg     2.0     10-14 @ 07:18    TPro  6.1  /  Alb  3.2  /  TBili  0.3  /  DBili  x   /  AST  31  /  ALT  36  /  AlkPhos  122  10-13 @ 03:00    PT/INR - ( 10-11 @ 23:20 )   PT: 11.3 sec;   INR: 0.95 ratio    PTT - ( 10-11 @ 23:20 )  PTT:35.3 sec    Uric Acid: (10-11 @ 23:20)  4.6      Fibrinogen: (10-11 @ 23:20)  543      LDH: (10-11 @ 23:20)  412        MEDICATIONS  (STANDING):  enoxaparin Injectable 40 milliGRAM(s) SubCutaneous every 24 hours  ferrous    sulfate 325 milliGRAM(s) Oral daily  furosemide   Injectable 40 milliGRAM(s) IV Push two times a day  hydrALAZINE 20 milliGRAM(s) Oral three times a day  losartan 25 milliGRAM(s) Oral daily  potassium chloride    Tablet ER 20 milliEquivalent(s) Oral every 2 hours  senna 2 Tablet(s) Oral at bedtime    MEDICATIONS  (PRN):  acetaminophen     Tablet .. 650 milliGRAM(s) Oral every 6 hours PRN Mild Pain (1 - 3), Moderate Pain (4 - 6)  ondansetron Injectable 4 milliGRAM(s) IV Push four times a day PRN Nausea and/or Vomiting  pantoprazole    Tablet 40 milliGRAM(s) Oral before breakfast PRN acid reflux

## 2022-10-14 NOTE — PROGRESS NOTE ADULT - ATTENDING COMMENTS
MFM Attending    Patient evaluated and counseled on MFM rounds today.  Agree with the assessment and plan above.      In summary Ms. 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  She is clinically improving with continued diuresis - now stable on room air.  She is on standing Lasix as well as hydralazine and losartan.  She will likely be started on a beta blocker after she is euvolemic.  We discussed the option to continue breastfeeding, as none of her current medications are absolutely contraindicated.  We discussed that there is no data on the drug combination, however the benefits of breastfeeding on this regimen likely still outweighs any risk.  The patient and her  stated that they wanted to think about their options further.  Appreciate excellent care by the Cardiology, CardioOb and Heart Failure teams.  Would suggest SW involvement as pt understandably devastated to be away from her .

## 2022-10-14 NOTE — PROGRESS NOTE ADULT - PROBLEM SELECTOR PLAN 1
2/2 PPCM with EF 15-20% seen on 10/12 echo  -warm and wet   -c/b AHRF; now improved (transitioned from BiPAP to NC)  -s/p IV Lasix 40 x2 with improvement  -consider another IV Lasix 40 dose today as still has signs of pulm edema on exam   -Strict I/Os  -Daily standing weights  -consider d/c pham  -GDMT by Cards OB  -c/w Losartan   -c/w hydralazine   -d/c nitro because patient would like to breastfeed   -hold metoprolol for now because patient is having appropriate tachycardic response to low CO 2/2 PPCM with EF 15-20% seen on 10/12 echo  -warm and wet   -c/b AHRF; now improved   -currently on IV Lasix 40 BID  -Strict I/Os  -Daily standing weights  -GDMT by HF  -switch Losartan to enalapril BID tomorrow   -d/c hydralazine tomorrow   -switch IV Lasix to PO Laxis 40 qD  -start metoprolol tomorrow afternoon  -d/c nitro because patient would like to breastfeed   -hold metoprolol for now because patient is having appropriate tachycardic response to low CO

## 2022-10-14 NOTE — PROGRESS NOTE ADULT - ASSESSMENT
A/P: 35yo POD#10 s/p pLTCS, a/w postpartum cardiomyopathy w/ complaints of SOB requiring supplemental O2. Patient symptomatically improving, successfully weaned off O2. Patient hemodynamically and clinically stable.     #Postpartum Cardiomyopathy  - EKG sinus tachycardia  - Echo(10/12): EF 15-20%, severe global LV systolic dysfunction, Grade 3-4(severe) diastolic dysfunction, nml RV function, b/l pleural effusions  - Appreciate excellent care per primary team  - Continue diuresis per primary team  - Patient now on room air  - Monitor VS closely    #Postoperative Status  - Consider d/c pham   - Analgesics PRN  - Patient can continue to breastfeed while receiving current medication regimen  - DVT ppx: Lovenox and ambulation as tolerated    Alma López MD PGY3 A/P: 35yo POD#10 s/p pLTCS, a/w postpartum cardiomyopathy w/ complaints of SOB requiring supplemental O2. Patient symptomatically improving, successfully weaned off O2. Patient hemodynamically and clinically stable.     #Postpartum Cardiomyopathy  - EKG sinus tachycardia  - Echo(10/12): EF 15-20%, severe global LV systolic dysfunction, Grade 3-4(severe) diastolic dysfunction, nml RV function, b/l pleural effusions  - Appreciate excellent care per primary team  - Continue diuresis per primary team  - Patient now on room air  - Monitor VS closely    #Postoperative Status  - Consider d/c pham   - Analgesics PRN  - Patient can continue to breastfeed while receiving current medication regimen  - DVT ppx: Lovenox and ambulation as tolerated    Alma López MD PGY3      MFM FELLOW ADDENDUM:   35 yo now  on POD 10 s/p primary  delivery, presenting to [facility] with SOB and found to have diastolic and systolic dysfunction on TTE, with 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 Patient initially admitted to the CICU, and now transferred to the floor, improving. S/p diuresis and on hydralazine for afterload reduction, weaned from 6L NC to now on room air.   Discussed breastfeeding on current medication regimen. Discussed that none of the medications she is on is a contraindication to breastfeeding, though we do not have any data on the combination of medications and their potential impact. We reassured her that breastfeeding is reasonable should she choose to do so. Also discussed with patient and her  that they should mention her medication regimen to the pediatricians taking care of their baby.     - Appreciated excellent care per Cardiology teams. CardioOB and HF following.    - Discussed case with CardioOB team, appreciate reccs   - Patient with pump and pump supplies    ANJELICA Chawla Fellow  Seen and discussed with ANJELICA Martin Attending

## 2022-10-14 NOTE — PROGRESS NOTE ADULT - PROBLEM SELECTOR PLAN 2
likely PPCM i/s/o recent pregnancy  -r/o ICM; Trops 59; continue to trend; ischemic workup prior to d/c to r/o SCAD/ICM  EF 15-20% seen on echo on 10/12  GDMT by Cards OB (see above)  EP to see patient outpatient for eval of ICD for primary prevention of SCD; consider asking prior to d/c if she needs lifevest/WCD  expected improvement in 3 months likely PPCM i/s/o recent pregnancy  -r/o ICM; Trops peaked; consider ischemic workup prior to d/c to r/o SCAD/ICM  EF 15-20% seen on echo on 10/12  GDMT by HF (see above)  EP to see patient outpatient for eval of ICD for primary prevention of SCD; consider asking prior to d/c if she needs lifevest/WCD  expected improvement in 3 months

## 2022-10-14 NOTE — PROGRESS NOTE ADULT - PROBLEM SELECTOR PLAN 2
- GDMT as above  - discussed importance of birth control as another pregnancy prior to recovery of her cardiomyopathy would not be recommended.

## 2022-10-14 NOTE — PROGRESS NOTE ADULT - PROBLEM SELECTOR PLAN 1
- Will stop IV Lasix and tomorrow start Lasix 40 mg PO daily  - d/c losartan. d/c HDZN  - start enalapril 7.5 mg BID tomorrow; hold for SBP <90 (ACE-I have a more favorable profile for breastfeeding than losartan). Should she decide not to breastfeed, will transition to ARNi  - start toprol XL 25 mg daily middle of the day   - SGLT2i contraindicated if breast feeding  - daily standing weights  - strict I/Os  - maintain K 4-4.5, Mg 2-2.5  - possible d/c 10.16 for f/u Dr Mederos next week

## 2022-10-14 NOTE — PROGRESS NOTE ADULT - TIME BILLING
s/p , peripartum cardiomyopathy
Postcesarean, peripartum cardiomyopathy
peripartum cardiomyopathy  acute decompensated systolic heart failure
Management of cardiomyopathy and hypoxia

## 2022-10-14 NOTE — PROGRESS NOTE ADULT - SUBJECTIVE AND OBJECTIVE BOX
S/24 Events: Patient seen and examined. Denies CP, SOB, dizziness, LH, near syncope or sycope.   No acute events overnight. Found resting in bed.   Telemetry : -Sinus Tach 130's  O:  T(C): 37.2 (10-14-22 @ 11:53), Max: 37.2 (10-14-22 @ 11:53)  HR: 117 (10-14-22 @ 11:53) (112 - 132)  BP: 99/68 (10-14-22 @ 11:53) (99/68 - 124/86)  RR: 18 (10-14-22 @ 11:53) (18 - 20)  SpO2: 93% (10-14-22 @ 11:53) (93% - 96%)       Daily   Gen: NAD  HEENT: EOMI  CV: RRR, normal S1 + S2, no m/r/g  Lungs: CTAB  Abd: soft, non-tender  Ext: No edema    Labs:                        11.2   10.42 )-----------( 417      ( 14 Oct 2022 07:16 )             35.3     10-14    140  |  104  |  21  ----------------------------<  83  4.1   |  21<L>  |  0.67    Ca    8.4      14 Oct 2022 07:18  Phos  3.3     10-14  Mg     2.0     10-14    TPro  6.1  /  Alb  3.2<L>  /  TBili  0.3  /  DBili  x   /  AST  31  /  ALT  36  /  AlkPhos  122<H>  10-13     Diagnostics : < from: Transthoracic Echocardiogram (10.12.22 @ 08:02) >  CONCLUSIONS:  1. Tethered mitral valve leaflets. Mild mitral  regurgitation.  2. Moderately dilated left atrium.  LA volume index = 42  cc/m2.  3. Normal left ventricular internal dimensions and wall  thicknesses.  4. Severe global left ventricular systolic dysfunction.  5. Grade III-IV  diastolic dysfunction(severe).  6. Normal right ventricular size and function.  7. Bilateral pleural effusions.    ------------------------------------------------------------------------  Confirmed on  10/12/2022 - 16:59:58 by Florencio Meek MD      MEDICATIONS  (STANDING):  enoxaparin Injectable 40 milliGRAM(s) SubCutaneous every 24 hours  ferrous    sulfate 325 milliGRAM(s) Oral daily  furosemide   Injectable 40 milliGRAM(s) IV Push two times a day  hydrALAZINE 20 milliGRAM(s) Oral three times a day  losartan 25 milliGRAM(s) Oral daily  senna 2 Tablet(s) Oral at bedtime      A/P:         S/24 Events: Patient seen and examined. Denies CP, SOB, dizziness, LH, near syncope or sycope.   No acute events overnight. Found resting in bed.   Telemetry : -Sinus Tach 130's  O:  T(C): 37.2 (10-14-22 @ 11:53), Max: 37.2 (10-14-22 @ 11:53)  HR: 117 (10-14-22 @ 11:53) (112 - 132)  BP: 99/68 (10-14-22 @ 11:53) (99/68 - 124/86)  RR: 18 (10-14-22 @ 11:53) (18 - 20)  SpO2: 93% (10-14-22 @ 11:53) (93% - 96%)       Daily   Gen: NAD  HEENT: EOMI  CV: RRR, normal S1 + S2, no m/r/g  Lungs: CTAB  Abd: soft, non-tender  Ext: No edema    Labs:                        11.2   10.42 )-----------( 417      ( 14 Oct 2022 07:16 )             35.3     10-14    140  |  104  |  21  ----------------------------<  83  4.1   |  21<L>  |  0.67    Ca    8.4      14 Oct 2022 07:18  Phos  3.3     10-14  Mg     2.0     10-14    TPro  6.1  /  Alb  3.2<L>  /  TBili  0.3  /  DBili  x   /  AST  31  /  ALT  36  /  AlkPhos  122<H>  10-13     Diagnostics : < from: Transthoracic Echocardiogram (10.12.22 @ 08:02) >  CONCLUSIONS:  1. Tethered mitral valve leaflets. Mild mitral  regurgitation.  2. Moderately dilated left atrium.  LA volume index = 42  cc/m2.  3. Normal left ventricular internal dimensions and wall  thicknesses.  4. Severe global left ventricular systolic dysfunction.  5. Grade III-IV  diastolic dysfunction(severe).  6. Normal right ventricular size and function.  7. Bilateral pleural effusions.    ------------------------------------------------------------------------  Confirmed on  10/12/2022 - 16:59:58 by Florencio Meek MD      MEDICATIONS  (STANDING):  enoxaparin Injectable 40 milliGRAM(s) SubCutaneous every 24 hours  ferrous    sulfate 325 milliGRAM(s) Oral daily  furosemide   Injectable 40 milliGRAM(s) IV Push two times a day  hydrALAZINE 20 milliGRAM(s) Oral three times a day  losartan 25 milliGRAM(s) Oral daily  senna 2 Tablet(s) Oral at bedtime

## 2022-10-14 NOTE — PROGRESS NOTE ADULT - PROBLEM SELECTOR PLAN 5
DVT PPx: Lovenox; encourage ambulation  Diet: Dash  Pain control: Tylenol for post op pain  if needed  Dispo: Medically Active DVT PPx: Lovenox; encourage ambulation  Diet: Dash  Pain control: Tylenol for post op pain  if needed  Dispo: Home

## 2022-10-14 NOTE — PROGRESS NOTE ADULT - SUBJECTIVE AND OBJECTIVE BOX
EP Attending    HISTORY OF PRESENT ILLNESS: HPI:  Patient is a 35 yo Female, hx Gestational DM (diet controlled), Gestational HTN (did not require meds), recent LSCS 10/4/2022, Chronic anemia, presenting with gradual progressive shortness of breath and bilateral lower extremity swelling since returning home after LSCS. Pt's O2 saturation at home was recorded to be 80%, hence patient was brought to the ED. Pt currently on Bipap, reports no chest pain, fevers, abdominal pain, nausea, vomiting, dysuria. Pt only taking vitamins at home.   In the ED, vitals were  Temp 99.2, -130s, /86, RR 26, 92% RA> given tachypnea and tachycardia> transitioned to Bipap  RR 12, FiO2 100% saturating 99%  Xray chest pulm edema s/p 20 mg IV Lasix x 2 doses> 2500cc Carrion output, started on low dose Nitro drip, titrate to   Pt was at [facility] where echo revealed acutely reduced EF 15-20%. Pt was diuresed with Lasix 20 x2 and weaned off Bipap. She has remained on 6L NC with acceptable O2. She was transferred to [facility] CICU for continued management.  (13 Oct 2022 02:08)    10/13- resting in [facility] CICU, doing well with IV lasix and afterload reduction w/ hydralazine.  Besides being short of breath, no angina, no palpitations, no near-syncope.  No family history of similar cardiomyopathy.  A 10 pt ROS is otherwise negative.  Date of service 10/14- uneventful overnight, no palpitations, no lightheadedness.    acetaminophen     Tablet .. 650 milliGRAM(s) Oral every 6 hours PRN  enoxaparin Injectable 40 milliGRAM(s) SubCutaneous every 24 hours  ferrous    sulfate 325 milliGRAM(s) Oral daily  furosemide   Injectable 40 milliGRAM(s) IV Push two times a day  hydrALAZINE 20 milliGRAM(s) Oral three times a day  losartan 25 milliGRAM(s) Oral daily  ondansetron Injectable 4 milliGRAM(s) IV Push four times a day PRN  pantoprazole    Tablet 40 milliGRAM(s) Oral before breakfast PRN  potassium chloride    Tablet ER 20 milliEquivalent(s) Oral every 2 hours  senna 2 Tablet(s) Oral at bedtime                            11.2   10.42 )-----------( 417      ( 14 Oct 2022 07:16 )             35.3       10-14    140  |  104  |  21  ----------------------------<  83  4.1   |  21<L>  |  0.67    Ca    8.4      14 Oct 2022 07:18  Phos  3.3     10-14  Mg     2.0     10-14    TPro  6.1  /  Alb  3.2<L>  /  TBili  0.3  /  DBili  x   /  AST  31  /  ALT  36  /  AlkPhos  122<H>  10-13    T(C): 36.8 (10-14-22 @ 04:47), Max: 36.9 (10-13-22 @ 22:22)  HR: 132 (10-14-22 @ 09:01) (112 - 134)  BP: 102/71 (10-14-22 @ 09:01) (102/71 - 133/88)  RR: 18 (10-14-22 @ 04:47) (18 - 24)  SpO2: 95% (10-14-22 @ 04:47) (94% - 96%)  Wt(kg): --    I&O's Summary    13 Oct 2022 07:01  -  14 Oct 2022 07:00  --------------------------------------------------------  IN: 240 mL / OUT: 1055 mL / NET: -815 mL    Appearance: Normal appearing young woman in no acute distress  HEENT:   Normal oral mucosa, PERRL, EOMI	  Lymphatic: No lymphadenopathy, ++LE edema  Cardiovascular: Rapid regular S1 S2, +JVD, No murmurs , Peripheral pulses palpable 2+ bilaterally  Respiratory: Lungs clear to auscultation, normal effort 	  Gastrointestinal:  Soft, Non-tender, + BS	  Skin: No rashes, No ecchymoses, No cyanosis, warm to touch  Musculoskeletal: Normal range of motion, normal strength  Psychiatry:  Mood & affect appropriate    TELEMETRY: sinus tachycardia	    ECG: sinus tachycardia, normal intervals.  Echo:  < from: Transthoracic Echocardiogram (10.12.22 @ 08:02) >  Dimensions:     Normal Values:  LA:     4.3 cm    2.0 - 4.0 cm  Ao:     2.6 cm    2.0 - 3.8 cm  SEPTUM: 0.8 cm    0.6 - 1.2 cm  PWT:    0.8 cm    0.6 - 1.1 cm  LVIDd:  5.6 cm    3.0 - 5.6 cm  LVIDs:  5.1 cm    1.8 - 4.0 cm      Derived Variables:  LVMI: 98 g/m2  RWT: 0.28  Ejection Fraction Visual Estimate: 15-20 %    ------------------------------------------------------------------------  OBSERVATIONS:  Mitral Valve: Tethered mitral valve leaflets. Mild mitral  regurgitation.  Aortic Root: Normal aortic root.  Aortic Valve: Aortic valve leaflet morphology not well  visualized, opens normally.  Left Atrium: Moderately dilated left atrium.  LA volume  index = 42 cc/m2.  Left Ventricle: Severe global left ventricular systolic  dysfunction. Normal left ventricular internal dimensions  and wall thicknesses. Grade III-IV  diastolic dysfunction  (severe).  Right Heart: Normal right atrium. Normal right ventricular  size and function. There is mild tricuspid regurgitation.  There is mild pulmonic regurgitation.  Pericardium/PleuraNormal pericardium with no pericardial  effusion. Bilateral pleural effusions.  Hemodynamic: Incomplete tricuspid regurgitation jet  precludes accurate assessment of pulmonary artery systolic  pressure.    < end of copied text >    ASSESSMENT/PLAN: 	36y Female with acute systolic heart failure.  Is very recently post partum with  delivery, new orthopnea, LE edema, SOB.  Echo with severe LV systolic dysfunction.  LV dimensions dilated to upper limit of normal.  Valves look normal.  In all likelihood, a peripartum cardiomyopathy.  Discussed benefits to cessation of pumping for feeding (can still 'pump & dump') in order to prioritize best CHF mdications.  As she is very young with thus far no arrhythmia on telemetry, would give her longer than 3 months to recover her ejection fraction.  Outpatient evaluation for a primary prevention ICD, with serial echocardiograms.  ~6 months timeframe.  Counselling and family planning, re: avoiding becoming pregnant again.      Stephen Fontenot M.D.  Cardiac Electrophysiology  office [phone]  pager [phone]

## 2022-10-14 NOTE — PROGRESS NOTE ADULT - ASSESSMENT
In summary, Ms. Roberson is 36 year old female presented with acute decompensated HF with newly diagnosed acute systolic heart failure   delivery, new orthopnea, LE edema, SOB.  Echo with severe LV systolic dysfunction.  LV dimensions dilated to upper limit of normal.  Valves look normal.    # Peripartum Cardiomyopathy.  Defer starting BB pending diuresis is optimized since patient still tachycardic jerzy resting HR ~ 130's.       * Would prefer an ARB for easier transitioning to Entresto once she discontinues breast feeding her .       Would not start beta blockade despite tachycardia until patient is euvolemic      Would give her longer than 3 months to recover her ejection fraction.      Cardio OB following       In summary, Ms. Roberson is 36 year old female presented with acute decompensated HF with newly diagnosed acute systolic heart failure   delivery, new orthopnea, LE edema, SOB.  Echo with severe LV systolic dysfunction.  LV dimensions dilated to upper limit of normal.  Valves look normal.    # Peripartum Cardiomyopathy.      Defer starting BB pending diuresis is optimized since patient still tachycardic with resting HR ~ 130's.       Would prefer an ARB for easier transitioning to Entresto once she discontinues breast feeding her .       Would give her longer than 3 months to recover her ejection fraction.      Continue Diuresis per HF reccomendations      Maintain K >4.0 and Mg >1.8       Cardio OB following

## 2022-10-14 NOTE — PROVIDER CONTACT NOTE (OTHER) - ASSESSMENT
pt resting in bed, hr 130 sinus sustaining, when OOB in chair 150's, pt denies chest pain, discomfort or SOB

## 2022-10-14 NOTE — PROGRESS NOTE ADULT - ATTENDING COMMENTS
This is a 35 y/o F PMH Gestational DM (diet controlled) and gestational HTN (not on meds) presented from [facility] for further management of SOB consistent with postpartum cardiomyopathy ( EF 15-20%). The patient was admitted to CICU and stabilized, now transferred to medicine service on Tele.     1. Acute systolic HF, likely postpartum cardiomyopathy ( EF 15-20%)  2. Acute Hypoxic respiratory failure  3. HTN  4. T2DM, diet controlled  5. s/p C- section 10/4/22    - Breathing improving, swelling LE improving also, O2 sat 93% RA, feels great. had  section on 10/4/22  - Spoke to HF attending- would resume Captopril/Enalapril and BB tomorrow, Lasix to PO. Off Hydralazine, Losartan  - Patient doen't want to breat feed at this point  - Daily I/o, weight  - Echo as outpatient in 12 weeks.  - OBGyn plan noted, breast pump every 3 hrs  - DVT ppx  - Educated about contraception and not to get pregnant in next 6 month at least. Has to have Cardiology clearance.

## 2022-10-14 NOTE — PROGRESS NOTE ADULT - ASSESSMENT
Ms. Roberson is a 37 y/o F with PMH of asthma, gestational DM (diet controlled) and gestational (HTN, not requiring medication, outpatient BP generally 130/80s), mild COVID infection in 8/2022 who had an elective csection on 10/4 which was uncomplicated, with newly diagnosed HFrEF (LVEF 15-20%, LVIDd 5.6cm), which is likely a peripartum cardiomyopathy. She is improving from a volume standpoint, with improvement in symptoms and now appears euvolemic. She is tachycardic which appears to be new as outpatient records show HR 70-90s and is likely compensatory. She has room for further afterload reduction. She has normal renal function.     10/12 TTE: LVEF 15-20%, LVIDd 5.6cm, grade III-IV DD, nl RV size and function, mild MR, mild TR

## 2022-10-14 NOTE — PROGRESS NOTE ADULT - ASSESSMENT
Patient seen and examined, agree with above assessment and plan as transcribed above.    - volume status improving  - cont to keep net negative, hoping Heart rate will improve with diuretics and afterload reduction    Florencio Meek MD, [facility]  BEEPER [phone]

## 2022-10-15 LAB
ANION GAP SERPL CALC-SCNC: 13 MMOL/L — SIGNIFICANT CHANGE UP (ref 5–17)
ANION GAP SERPL CALC-SCNC: 13 MMOL/L — SIGNIFICANT CHANGE UP (ref 5–17)
BUN SERPL-MCNC: 22 MG/DL — SIGNIFICANT CHANGE UP (ref 7–23)
BUN SERPL-MCNC: 24 MG/DL — HIGH (ref 7–23)
CALCIUM SERPL-MCNC: 8.7 MG/DL — SIGNIFICANT CHANGE UP (ref 8.4–10.5)
CALCIUM SERPL-MCNC: 8.8 MG/DL — SIGNIFICANT CHANGE UP (ref 8.4–10.5)
CHLORIDE SERPL-SCNC: 102 MMOL/L — SIGNIFICANT CHANGE UP (ref 96–108)
CHLORIDE SERPL-SCNC: 104 MMOL/L — SIGNIFICANT CHANGE UP (ref 96–108)
CO2 SERPL-SCNC: 23 MMOL/L — SIGNIFICANT CHANGE UP (ref 22–31)
CO2 SERPL-SCNC: 25 MMOL/L — SIGNIFICANT CHANGE UP (ref 22–31)
CREAT SERPL-MCNC: 0.7 MG/DL — SIGNIFICANT CHANGE UP (ref 0.5–1.3)
CREAT SERPL-MCNC: 0.8 MG/DL — SIGNIFICANT CHANGE UP (ref 0.5–1.3)
EGFR: 116 ML/MIN/1.73M2 — SIGNIFICANT CHANGE UP
EGFR: 98 ML/MIN/1.73M2 — SIGNIFICANT CHANGE UP
GLUCOSE SERPL-MCNC: 154 MG/DL — HIGH (ref 70–99)
GLUCOSE SERPL-MCNC: 79 MG/DL — SIGNIFICANT CHANGE UP (ref 70–99)
HCT VFR BLD CALC: 34.9 % — SIGNIFICANT CHANGE UP (ref 34.5–45)
HGB BLD-MCNC: 11.1 G/DL — LOW (ref 11.5–15.5)
MAGNESIUM SERPL-MCNC: 2 MG/DL — SIGNIFICANT CHANGE UP (ref 1.6–2.6)
MAGNESIUM SERPL-MCNC: 2 MG/DL — SIGNIFICANT CHANGE UP (ref 1.6–2.6)
MCHC RBC-ENTMCNC: 29.4 PG — SIGNIFICANT CHANGE UP (ref 27–34)
MCHC RBC-ENTMCNC: 31.8 GM/DL — LOW (ref 32–36)
MCV RBC AUTO: 92.6 FL — SIGNIFICANT CHANGE UP (ref 80–100)
NRBC # BLD: 0 /100 WBCS — SIGNIFICANT CHANGE UP (ref 0–0)
PHOSPHATE SERPL-MCNC: 4 MG/DL — SIGNIFICANT CHANGE UP (ref 2.5–4.5)
PHOSPHATE SERPL-MCNC: 4.3 MG/DL — SIGNIFICANT CHANGE UP (ref 2.5–4.5)
PLATELET # BLD AUTO: 413 K/UL — HIGH (ref 150–400)
POTASSIUM SERPL-MCNC: 3.4 MMOL/L — LOW (ref 3.5–5.3)
POTASSIUM SERPL-MCNC: 3.6 MMOL/L — SIGNIFICANT CHANGE UP (ref 3.5–5.3)
POTASSIUM SERPL-SCNC: 3.4 MMOL/L — LOW (ref 3.5–5.3)
POTASSIUM SERPL-SCNC: 3.6 MMOL/L — SIGNIFICANT CHANGE UP (ref 3.5–5.3)
RBC # BLD: 3.77 M/UL — LOW (ref 3.8–5.2)
RBC # FLD: 14.7 % — HIGH (ref 10.3–14.5)
SODIUM SERPL-SCNC: 138 MMOL/L — SIGNIFICANT CHANGE UP (ref 135–145)
SODIUM SERPL-SCNC: 142 MMOL/L — SIGNIFICANT CHANGE UP (ref 135–145)
WBC # BLD: 7.1 K/UL — SIGNIFICANT CHANGE UP (ref 3.8–10.5)
WBC # FLD AUTO: 7.1 K/UL — SIGNIFICANT CHANGE UP (ref 3.8–10.5)

## 2022-10-15 PROCEDURE — 99233 SBSQ HOSP IP/OBS HIGH 50: CPT | Mod: GC

## 2022-10-15 RX ORDER — POTASSIUM CHLORIDE 20 MEQ
20 PACKET (EA) ORAL ONCE
Refills: 0 | Status: COMPLETED | OUTPATIENT
Start: 2022-10-15 | End: 2022-10-15

## 2022-10-15 RX ORDER — POTASSIUM CHLORIDE 20 MEQ
20 PACKET (EA) ORAL
Refills: 0 | Status: DISCONTINUED | OUTPATIENT
Start: 2022-10-15 | End: 2022-10-15

## 2022-10-15 RX ORDER — FUROSEMIDE 40 MG
40 TABLET ORAL DAILY
Refills: 0 | Status: DISCONTINUED | OUTPATIENT
Start: 2022-10-15 | End: 2022-10-16

## 2022-10-15 RX ORDER — METOPROLOL TARTRATE 50 MG
25 TABLET ORAL DAILY
Refills: 0 | Status: DISCONTINUED | OUTPATIENT
Start: 2022-10-15 | End: 2022-10-16

## 2022-10-15 RX ORDER — POTASSIUM CHLORIDE 20 MEQ
20 PACKET (EA) ORAL
Refills: 0 | Status: COMPLETED | OUTPATIENT
Start: 2022-10-15 | End: 2022-10-15

## 2022-10-15 RX ADMIN — Medication 20 MILLIEQUIVALENT(S): at 09:27

## 2022-10-15 RX ADMIN — Medication 25 MILLIGRAM(S): at 14:15

## 2022-10-15 RX ADMIN — Medication 20 MILLIEQUIVALENT(S): at 12:39

## 2022-10-15 RX ADMIN — Medication 20 MILLIEQUIVALENT(S): at 14:15

## 2022-10-15 RX ADMIN — Medication 40 MILLIGRAM(S): at 06:06

## 2022-10-15 RX ADMIN — Medication 325 MILLIGRAM(S): at 12:39

## 2022-10-15 RX ADMIN — Medication 20 MILLIGRAM(S): at 06:03

## 2022-10-15 RX ADMIN — SENNA PLUS 2 TABLET(S): 8.6 TABLET ORAL at 22:33

## 2022-10-15 RX ADMIN — ENOXAPARIN SODIUM 40 MILLIGRAM(S): 100 INJECTION SUBCUTANEOUS at 06:03

## 2022-10-15 RX ADMIN — Medication 7.5 MILLIGRAM(S): at 06:06

## 2022-10-15 RX ADMIN — PANTOPRAZOLE SODIUM 40 MILLIGRAM(S): 20 TABLET, DELAYED RELEASE ORAL at 06:04

## 2022-10-15 NOTE — PROGRESS NOTE ADULT - PROBLEM SELECTOR PLAN 2
likely PPCM i/s/o recent pregnancy  -r/o ICM; Trops peaked; consider ischemic workup prior to d/c to r/o SCAD/ICM  EF 15-20% seen on echo on 10/12  GDMT by HF (see above)  EP to see patient outpatient for eval of ICD for primary prevention of SCD; consider asking prior to d/c if she needs lifevest/WCD  expected improvement in 3 months likely PPCM i/s/o recent pregnancy  -r/o ICM; Trops peaked; consider ischemic workup prior to d/c to r/o SCAD/ICM  EF 15-20% seen on echo on 10/12  GDMT by HF (see above)  EP to see patient outpatient for eval of ICD for primary prevention of SCD; consider asking prior to d/c if she needs lifevest/WCD  expected improvement in 3 months  To follow with Dr. Mederos in 1 week

## 2022-10-15 NOTE — PROGRESS NOTE ADULT - ASSESSMENT
37 y/o F PMH Gestational DM (diet controlled) and gestational HTN (not on meds) presented from [facility] for further management of ADHF (EF 15%) of unknown etiology most c/f Postpartum cardiomyopathy with volume overload s/p IV Lasix 40 x2. Respiratory status improving, now on room air. Cardiology, OB Cardiology, EP following.

## 2022-10-15 NOTE — PROGRESS NOTE ADULT - NS ATTEND AMEND GEN_ALL_CORE FT
BP and CHF symptoms improving.  low dose beta blocker before discharge.  will follow up long-term for evaluation for ICD.

## 2022-10-15 NOTE — PROGRESS NOTE ADULT - ATTENDING COMMENTS
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 She was initially on Bipap and admitted to the CCU but with IV diuresis, she improved and was transferred to the floors. She has been transitioned to PO Lasix and her GDMT is being titrated by the cardiology team. She does have sinus tachycardia which is likely physiologic response to her low cardiac output. Possible discharge home tomorrow pending cardiology clearance. Rest of plan as above.    Wyatt Convissar, DO  Pager [phone]  If no answer [phone]

## 2022-10-15 NOTE — PROGRESS NOTE ADULT - PROBLEM SELECTOR PLAN 1
2/2 PPCM with EF 15-20% seen on 10/12 echo  -warm and wet   -c/b AHRF; now improved   -currently on IV Lasix 40 BID  -Strict I/Os  -Daily standing weights  -GDMT by HF  -switch Losartan to enalapril BID tomorrow   -d/c hydralazine tomorrow   -switch IV Lasix to PO Laxis 40 qD  -start metoprolol tomorrow afternoon  -d/c nitro because patient would like to breastfeed   -hold metoprolol for now because patient is having appropriate tachycardic response to low CO 2/2 PPCM with EF 15-20% seen on 10/12 echo  -warm and wet   -c/b AHRF; now improved   -currently on Lasix 40 PO  -Strict I/Os  -Daily standing weights  -GDMT by HF  -c/w enalapril 7.5 mg BID  -consider metoprolol this PM

## 2022-10-15 NOTE — PROGRESS NOTE ADULT - PROBLEM SELECTOR PLAN 5
DVT PPx: Lovenox; encourage ambulation  Diet: Dash  Pain control: Tylenol for post op pain  if needed  Dispo: Home

## 2022-10-15 NOTE — PROGRESS NOTE ADULT - PROBLEM SELECTOR PLAN 3
likely appropriate physiologic response to low CO with HF  -c/w telemetry likely appropriate physiologic response to low CO with HF  -c/w telemetry  -consider starting metoprolol this PM

## 2022-10-15 NOTE — PROGRESS NOTE ADULT - ASSESSMENT
Patient seen and examined, agree with above assessment and plan as transcribed above.    - volume status improving  - cont to keep net negative, hoping Heart rate will improve with diuretics and afterload reduction    Florencio Meek MD, [facility]  BEEPER [phone]

## 2022-10-15 NOTE — PROGRESS NOTE ADULT - SUBJECTIVE AND OBJECTIVE BOX
INTERVAL HPI/OVERNIGHT EVENTS:    SUBJECTIVE: Patient seen and examined at bedside.    OBJECTIVE:    VITAL SIGNS:  ICU Vital Signs Last 24 Hrs  T(C): 36.8 (15 Oct 2022 04:44), Max: 37.2 (14 Oct 2022 11:53)  T(F): 98.3 (15 Oct 2022 04:44), Max: 98.9 (14 Oct 2022 11:53)  HR: 123 (15 Oct 2022 04:44) (117 - 135)  BP: 114/77 (15 Oct 2022 04:44) (99/68 - 114/77)  BP(mean): --  ABP: --  ABP(mean): --  RR: 18 (15 Oct 2022 04:44) (18 - 18)  SpO2: 94% (15 Oct 2022 04:44) (93% - 96%)    O2 Parameters below as of 15 Oct 2022 04:44  Patient On (Oxygen Delivery Method): room air              10-14 @ 07:01  -  10-15 @ 07:00  --------------------------------------------------------  IN: 280 mL / OUT: 1801 mL / NET: -1521 mL      CAPILLARY BLOOD GLUCOSE          PHYSICAL EXAM:    General: NAD  HEENT: NC/AT; PERRL, clear conjunctiva  Neck: supple  Respiratory: CTA b/l  Cardiovascular: +S1/S2; RRR  Abdomen: soft, NT/ND; +BS x4  Extremities:  no LE edema  Vascular: WWP, 2+ peripheral pulses b/l;  Skin: normal color and turgor; no rash  Neurological: A&Ox3, move all extremities. CN II-XII intact    MEDICATIONS:  MEDICATIONS  (STANDING):  enalapril 7.5 milliGRAM(s) Oral two times a day  enoxaparin Injectable 40 milliGRAM(s) SubCutaneous every 24 hours  ferrous    sulfate 325 milliGRAM(s) Oral daily  furosemide    Tablet 40 milliGRAM(s) Oral daily  senna 2 Tablet(s) Oral at bedtime    MEDICATIONS  (PRN):  acetaminophen     Tablet .. 650 milliGRAM(s) Oral every 6 hours PRN Mild Pain (1 - 3), Moderate Pain (4 - 6)  ondansetron Injectable 4 milliGRAM(s) IV Push four times a day PRN Nausea and/or Vomiting  pantoprazole    Tablet 40 milliGRAM(s) Oral before breakfast PRN acid reflux      ALLERGIES:  Allergies    No Known Allergies    Intolerances        LABS:                        11.2   10.42 )-----------( 417      ( 14 Oct 2022 07:16 )             35.3     10-14    139  |  103  |  20  ----------------------------<  141<H>  3.8   |  23  |  0.73    Ca    9.0      14 Oct 2022 21:05  Phos  3.2     10-14  Mg     1.9     10-14            RADIOLOGY & ADDITIONAL TESTS: Reviewed. INTERVAL HPI/OVERNIGHT EVENTS: SHAHANA OVN.     SUBJECTIVE: Patient seen and examined at bedside. Patient feeling well this AM off oxygen. Denies SOB, chest pain, palpitations, abdominal pain, dysuria. Patient did report an episode of lightheadedness not associated with vision changes or diaphoresis that self-resolved.     OBJECTIVE:    VITAL SIGNS:  ICU Vital Signs Last 24 Hrs  T(C): 36.8 (15 Oct 2022 04:44), Max: 37.2 (14 Oct 2022 11:53)  T(F): 98.3 (15 Oct 2022 04:44), Max: 98.9 (14 Oct 2022 11:53)  HR: 123 (15 Oct 2022 04:44) (117 - 135)  BP: 114/77 (15 Oct 2022 04:44) (99/68 - 114/77)  BP(mean): --  ABP: --  ABP(mean): --  RR: 18 (15 Oct 2022 04:44) (18 - 18)  SpO2: 94% (15 Oct 2022 04:44) (93% - 96%)    O2 Parameters below as of 15 Oct 2022 04:44  Patient On (Oxygen Delivery Method): room air              10-14 @ 07:01  -  10-15 @ 07:00  --------------------------------------------------------  IN: 280 mL / OUT: 1801 mL / NET: -1521 mL      CAPILLARY BLOOD GLUCOSE          PHYSICAL EXAM:    General: NAD  HEENT: NC/AT; PERRL, clear conjunctiva  Neck: supple  Respiratory: Faint crackles heard throughout, improving.   Cardiovascular: +S1/S2; Tachycardic, regular rhythm  Abdomen: soft, NT/ND; +BS x4  Extremities:  no LE edema  Vascular: WWP, 2+ peripheral pulses b/l;  Skin: normal color and turgor; no rash  Neurological: A&Ox3, move all extremities.     MEDICATIONS:  MEDICATIONS  (STANDING):  enalapril 7.5 milliGRAM(s) Oral two times a day  enoxaparin Injectable 40 milliGRAM(s) SubCutaneous every 24 hours  ferrous    sulfate 325 milliGRAM(s) Oral daily  furosemide    Tablet 40 milliGRAM(s) Oral daily  senna 2 Tablet(s) Oral at bedtime    MEDICATIONS  (PRN):  acetaminophen     Tablet .. 650 milliGRAM(s) Oral every 6 hours PRN Mild Pain (1 - 3), Moderate Pain (4 - 6)  ondansetron Injectable 4 milliGRAM(s) IV Push four times a day PRN Nausea and/or Vomiting  pantoprazole    Tablet 40 milliGRAM(s) Oral before breakfast PRN acid reflux      ALLERGIES:  Allergies    No Known Allergies    Intolerances        LABS:                        11.2   10.42 )-----------( 417      ( 14 Oct 2022 07:16 )             35.3     10-14    139  |  103  |  20  ----------------------------<  141<H>  3.8   |  23  |  0.73    Ca    9.0      14 Oct 2022 21:05  Phos  3.2     10-14  Mg     1.9     10-14            RADIOLOGY & ADDITIONAL TESTS: Reviewed.

## 2022-10-15 NOTE — PROGRESS NOTE ADULT - SUBJECTIVE AND OBJECTIVE BOX
C A R [facility] I O [facility] O [facility]  **********************************     DATE OF SERVICE: 10-15-22    Patient denies chest pain , states SOB has improved.  MEDICATIONS:  acetaminophen     Tablet .. 650 milliGRAM(s) Oral every 6 hours PRN  enalapril 7.5 milliGRAM(s) Oral two times a day  enoxaparin Injectable 40 milliGRAM(s) SubCutaneous every 24 hours  ferrous    sulfate 325 milliGRAM(s) Oral daily  furosemide    Tablet 40 milliGRAM(s) Oral daily  ondansetron Injectable 4 milliGRAM(s) IV Push four times a day PRN  pantoprazole    Tablet 40 milliGRAM(s) Oral before breakfast PRN  potassium chloride    Tablet ER 20 milliEquivalent(s) Oral every 2 hours  senna 2 Tablet(s) Oral at bedtime      LABS:                        11.1   7.10  )-----------( 413      ( 15 Oct 2022 07:34 )             34.9       Hemoglobin: 11.1 g/dL (10-15 @ 07:34)  Hemoglobin: 11.2 g/dL (10-14 @ 07:16)  Hemoglobin: 10.1 g/dL (10-13 @ 02:14)  Hemoglobin: 10.4 g/dL (10-12 @ 03:58)  Hemoglobin: 10.9 g/dL (10-11 @ 23:20)      10-15    142  |  104  |  22  ----------------------------<  79  3.4<L>   |  25  |  0.70    Ca    8.8      15 Oct 2022 07:34  Phos  4.3     10-15  Mg     2.0     10-15      Creatinine Trend: 0.70<--, 0.73<--, 0.67<--, 0.66<--, 0.70<--, 0.60<--    COAGS:     PHYSICAL EXAM:  T(C): 36.8 (10-15-22 @ 04:44), Max: 36.8 (10-14-22 @ 23:59)  HR: 123 (10-15-22 @ 04:44) (117 - 135)  BP: 114/77 (10-15-22 @ 04:44) (103/71 - 114/77)  RR: 18 (10-15-22 @ 04:44) (18 - 18)  SpO2: 94% (10-15-22 @ 04:44) (94% - 96%)  Wt(kg): --    I&O's Summary    14 Oct 2022 07:01  -  15 Oct 2022 07:00  --------------------------------------------------------  IN: 280 mL / OUT: 1801 mL / NET: -1521 mL    General: Well nourished in no acute distress. Alert and Oriented * 3.   Head: Normocephalic and atraumatic.   Neck: No JVD. No bruits. Supple. Does not appear to be enlarged.   Cardiovascular: tachycardic but no murmurs  Lungs: CTA b/l. decreaed sounds  Abdomen: + BS, soft. Non tender. Non distended. No rebound. No guarding.   Extremities: No clubbing/cyanosis/edema.   Neurologic: Moves all four extremities. Full range of motion.   Skin: Warm and moist. The patient's skin has normal elasticity and good skin turgor.   Psychiatric: Appropriate mood and affect.  Musculoskeletal: Normal range of motion, normal strength     TELEMETRY: -130    < from: Transthoracic Echocardiogram (10.12.22 @ 08:02) >  Ejection Fraction Visual Estimate: 15-20 %  CONCLUSIONS:  1. Tethered mitral valve leaflets. Mild mitral  regurgitation.  2. Moderately dilated left atrium.  LA volume index = 42  cc/m2.  3. Normal left ventricular internal dimensions and wall  thicknesses.  4. Severe global left ventricular systolic dysfunction.  5. Grade III-IV  diastolic dysfunction(severe).  6. Normal right ventricular size and function.  7. Bilateral pleural effusions.    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 Transferred to [facility] CCU for further management now downgraded to telemetry floor.    - Appears euvolemic, Lasix switched to oral  - Agree with Enalapril  - Agree with starting  Toprol  - EP eval appreciated  - Follow up HF recs    Joyce Koroma MD  Pager: [phone]

## 2022-10-15 NOTE — PROGRESS NOTE ADULT - SUBJECTIVE AND OBJECTIVE BOX
EP     HISTORY OF PRESENT ILLNESS: HPI:  Patient is a 35 yo Female, hx Gestational DM (diet controlled), Gestational HTN (did not require meds), recent LSCS 10/4/2022, Chronic anemia, presenting with gradual progressive shortness of breath and bilateral lower extremity swelling since returning home after LSCS. Pt's O2 saturation at home was recorded to be 80%, hence patient was brought to the ED. Pt currently on Bipap, reports no chest pain, fevers, abdominal pain, nausea, vomiting, dysuria. Pt only taking vitamins at home.   In the ED, vitals were  Temp 99.2, -130s, /86, RR 26, 92% RA> given tachypnea and tachycardia> transitioned to Bipap  RR 12, FiO2 100% saturating 99%  Xray chest pulm edema s/p 20 mg IV Lasix x 2 doses> 2500cc Carrion output, started on low dose Nitro drip, titrate to   Pt was at [facility] where echo revealed acutely reduced EF 15-20%. Pt was diuresed with Lasix 20 x2 and weaned off Bipap. She has remained on 6L NC with acceptable O2. She was transferred to [facility] CICU for continued management.  (13 Oct 2022 02:08)    10/13- resting in [facility] CICU, doing well with IV lasix and afterload reduction w/ hydralazine.  Besides being short of breath, no angina, no palpitations, no near-syncope.  No family history of similar cardiomyopathy.  A 10 pt ROS is otherwise negative.   10/14- uneventful overnight, no palpitations, no lightheadedness.  10/15  no events overnight             acetaminophen     Tablet .. 650 milliGRAM(s) Oral every 6 hours PRN  enalapril 7.5 milliGRAM(s) Oral two times a day  enoxaparin Injectable 40 milliGRAM(s) SubCutaneous every 24 hours  ferrous    sulfate 325 milliGRAM(s) Oral daily  furosemide    Tablet 40 milliGRAM(s) Oral daily  ondansetron Injectable 4 milliGRAM(s) IV Push four times a day PRN  pantoprazole    Tablet 40 milliGRAM(s) Oral before breakfast PRN  potassium chloride    Tablet ER 20 milliEquivalent(s) Oral every 2 hours  senna 2 Tablet(s) Oral at bedtime                            11.1   7.10  )-----------( 413      ( 15 Oct 2022 07:34 )             34.9       Hemoglobin: 11.1 g/dL (10-15 @ 07:34)  Hemoglobin: 11.2 g/dL (10-14 @ 07:16)  Hemoglobin: 10.1 g/dL (10-13 @ 02:14)  Hemoglobin: 10.4 g/dL (10-12 @ 03:58)  Hemoglobin: 10.9 g/dL (10-11 @ 23:20)      10-15    142  |  104  |  22  ----------------------------<  79  3.4<L>   |  25  |  0.70    Ca    8.8      15 Oct 2022 07:34  Phos  4.3     10-15  Mg     2.0     10-15      Creatinine Trend: 0.70<--, 0.73<--, 0.67<--, 0.66<--, 0.70<--, 0.60<--    COAGS:           T(C): 36.8 (10-15-22 @ 04:44), Max: 37.2 (10-14-22 @ 11:53)  HR: 123 (10-15-22 @ 04:44) (117 - 135)  BP: 114/77 (10-15-22 @ 04:44) (99/68 - 114/77)  RR: 18 (10-15-22 @ 04:44) (18 - 18)  SpO2: 94% (10-15-22 @ 04:44) (93% - 96%)  Wt(kg): --    I&O's Summary    14 Oct 2022 07:01  -  15 Oct 2022 07:00  --------------------------------------------------------  IN: 280 mL / OUT: 1801 mL / NET: -1521 mL      Appearance: Normal appearing young woman in no acute distress  HEENT:   Normal oral mucosa, PERRL, EOMI	  Lymphatic: No lymphadenopathy, ++LE edema  Cardiovascular: Rapid regular S1 S2, +JVD, No murmurs , Peripheral pulses palpable 2+ bilaterally  Respiratory: Lungs clear to auscultation, normal effort 	  Gastrointestinal:  Soft, Non-tender, + BS	  Skin: No rashes, No ecchymoses, No cyanosis, warm to touch  Musculoskeletal: Normal range of motion, normal strength  Psychiatry:  Mood & affect appropriate    TELEMETRY: sinus tachycardia	    ECG: sinus tachycardia, normal intervals.  Echo:  < from: Transthoracic Echocardiogram (10.12.22 @ 08:02) >  Dimensions:     Normal Values:  LA:     4.3 cm    2.0 - 4.0 cm  Ao:     2.6 cm    2.0 - 3.8 cm  SEPTUM: 0.8 cm    0.6 - 1.2 cm  PWT:    0.8 cm    0.6 - 1.1 cm  LVIDd:  5.6 cm    3.0 - 5.6 cm  LVIDs:  5.1 cm    1.8 - 4.0 cm      Derived Variables:  LVMI: 98 g/m2  RWT: 0.28  Ejection Fraction Visual Estimate: 15-20 %    ------------------------------------------------------------------------  OBSERVATIONS:  Mitral Valve: Tethered mitral valve leaflets. Mild mitral  regurgitation.  Aortic Root: Normal aortic root.  Aortic Valve: Aortic valve leaflet morphology not well  visualized, opens normally.  Left Atrium: Moderately dilated left atrium.  LA volume  index = 42 cc/m2.  Left Ventricle: Severe global left ventricular systolic  dysfunction. Normal left ventricular internal dimensions  and wall thicknesses. Grade III-IV  diastolic dysfunction  (severe).  Right Heart: Normal right atrium. Normal right ventricular  size and function. There is mild tricuspid regurgitation.  There is mild pulmonic regurgitation.  Pericardium/PleuraNormal pericardium with no pericardial  effusion. Bilateral pleural effusions.  Hemodynamic: Incomplete tricuspid regurgitation jet  precludes accurate assessment of pulmonary artery systolic  pressure.    < end of copied text >    ASSESSMENT/PLAN: 	36y Female with acute systolic heart failure.  Is very recently post partum with  delivery, new orthopnea, LE edema, SOB.  Echo with severe LV systolic dysfunction.  LV dimensions dilated to upper limit of normal.  Valves look normal.  In all likelihood, a peripartum cardiomyopathy.  Discussed benefits to cessation of pumping for feeding (can still 'pump & dump') in order to prioritize best CHF mdications.  As she is very young with thus far no arrhythmia on telemetry, would give her longer than 3 months to recover her ejection fraction.  Outpatient evaluation for a primary prevention ICD, with serial echocardiograms.  ~6 months timeframe.  Counselling and family planning, re: avoiding becoming pregnant again.

## 2022-10-16 ENCOUNTER — TRANSCRIPTION ENCOUNTER (OUTPATIENT)
Age: 37
End: 2022-10-16

## 2022-10-16 VITALS
SYSTOLIC BLOOD PRESSURE: 100 MMHG | HEART RATE: 90 BPM | RESPIRATION RATE: 18 BRPM | TEMPERATURE: 98 F | OXYGEN SATURATION: 96 % | DIASTOLIC BLOOD PRESSURE: 68 MMHG

## 2022-10-16 LAB
ANION GAP SERPL CALC-SCNC: 12 MMOL/L — SIGNIFICANT CHANGE UP (ref 5–17)
BUN SERPL-MCNC: 23 MG/DL — SIGNIFICANT CHANGE UP (ref 7–23)
CALCIUM SERPL-MCNC: 8.7 MG/DL — SIGNIFICANT CHANGE UP (ref 8.4–10.5)
CHLORIDE SERPL-SCNC: 105 MMOL/L — SIGNIFICANT CHANGE UP (ref 96–108)
CO2 SERPL-SCNC: 23 MMOL/L — SIGNIFICANT CHANGE UP (ref 22–31)
CREAT SERPL-MCNC: 0.7 MG/DL — SIGNIFICANT CHANGE UP (ref 0.5–1.3)
EGFR: 116 ML/MIN/1.73M2 — SIGNIFICANT CHANGE UP
GLUCOSE SERPL-MCNC: 70 MG/DL — SIGNIFICANT CHANGE UP (ref 70–99)
HCT VFR BLD CALC: 34.6 % — SIGNIFICANT CHANGE UP (ref 34.5–45)
HGB BLD-MCNC: 10.9 G/DL — LOW (ref 11.5–15.5)
MAGNESIUM SERPL-MCNC: 2.1 MG/DL — SIGNIFICANT CHANGE UP (ref 1.6–2.6)
MCHC RBC-ENTMCNC: 29.6 PG — SIGNIFICANT CHANGE UP (ref 27–34)
MCHC RBC-ENTMCNC: 31.5 GM/DL — LOW (ref 32–36)
MCV RBC AUTO: 94 FL — SIGNIFICANT CHANGE UP (ref 80–100)
NRBC # BLD: 0 /100 WBCS — SIGNIFICANT CHANGE UP (ref 0–0)
PHOSPHATE SERPL-MCNC: 3.9 MG/DL — SIGNIFICANT CHANGE UP (ref 2.5–4.5)
PLATELET # BLD AUTO: 401 K/UL — HIGH (ref 150–400)
POTASSIUM SERPL-MCNC: 4 MMOL/L — SIGNIFICANT CHANGE UP (ref 3.5–5.3)
POTASSIUM SERPL-SCNC: 4 MMOL/L — SIGNIFICANT CHANGE UP (ref 3.5–5.3)
RBC # BLD: 3.68 M/UL — LOW (ref 3.8–5.2)
RBC # FLD: 14.6 % — HIGH (ref 10.3–14.5)
SODIUM SERPL-SCNC: 140 MMOL/L — SIGNIFICANT CHANGE UP (ref 135–145)
WBC # BLD: 5.98 K/UL — SIGNIFICANT CHANGE UP (ref 3.8–10.5)
WBC # FLD AUTO: 5.98 K/UL — SIGNIFICANT CHANGE UP (ref 3.8–10.5)

## 2022-10-16 PROCEDURE — 97161 PT EVAL LOW COMPLEX 20 MIN: CPT

## 2022-10-16 PROCEDURE — 86901 BLOOD TYPING SEROLOGIC RH(D): CPT

## 2022-10-16 PROCEDURE — 85027 COMPLETE CBC AUTOMATED: CPT

## 2022-10-16 PROCEDURE — 86850 RBC ANTIBODY SCREEN: CPT

## 2022-10-16 PROCEDURE — 80061 LIPID PANEL: CPT

## 2022-10-16 PROCEDURE — 84484 ASSAY OF TROPONIN QUANT: CPT

## 2022-10-16 PROCEDURE — 85025 COMPLETE CBC W/AUTO DIFF WBC: CPT

## 2022-10-16 PROCEDURE — 80048 BASIC METABOLIC PNL TOTAL CA: CPT

## 2022-10-16 PROCEDURE — 80053 COMPREHEN METABOLIC PANEL: CPT

## 2022-10-16 PROCEDURE — 83735 ASSAY OF MAGNESIUM: CPT

## 2022-10-16 PROCEDURE — 83605 ASSAY OF LACTIC ACID: CPT

## 2022-10-16 PROCEDURE — 93005 ELECTROCARDIOGRAM TRACING: CPT

## 2022-10-16 PROCEDURE — 84443 ASSAY THYROID STIM HORMONE: CPT

## 2022-10-16 PROCEDURE — 36415 COLL VENOUS BLD VENIPUNCTURE: CPT

## 2022-10-16 PROCEDURE — 99239 HOSP IP/OBS DSCHRG MGMT >30: CPT | Mod: GC

## 2022-10-16 PROCEDURE — 84100 ASSAY OF PHOSPHORUS: CPT

## 2022-10-16 PROCEDURE — 83036 HEMOGLOBIN GLYCOSYLATED A1C: CPT

## 2022-10-16 PROCEDURE — 86900 BLOOD TYPING SEROLOGIC ABO: CPT

## 2022-10-16 PROCEDURE — 71045 X-RAY EXAM CHEST 1 VIEW: CPT

## 2022-10-16 RX ORDER — FERROUS SULFATE 325(65) MG
1 TABLET ORAL
Qty: 0 | Refills: 0 | DISCHARGE
Start: 2022-10-16

## 2022-10-16 RX ORDER — FUROSEMIDE 40 MG
1 TABLET ORAL
Qty: 30 | Refills: 0
Start: 2022-10-16 | End: 2022-11-14

## 2022-10-16 RX ORDER — METOPROLOL TARTRATE 50 MG
1 TABLET ORAL
Qty: 30 | Refills: 0
Start: 2022-10-16 | End: 2022-11-14

## 2022-10-16 RX ADMIN — Medication 7.5 MILLIGRAM(S): at 06:16

## 2022-10-16 RX ADMIN — Medication 40 MILLIGRAM(S): at 06:16

## 2022-10-16 RX ADMIN — Medication 20 MILLIEQUIVALENT(S): at 00:28

## 2022-10-16 RX ADMIN — ENOXAPARIN SODIUM 40 MILLIGRAM(S): 100 INJECTION SUBCUTANEOUS at 06:15

## 2022-10-16 RX ADMIN — Medication 25 MILLIGRAM(S): at 06:19

## 2022-10-16 NOTE — PROGRESS NOTE ADULT - PROBLEM SELECTOR PROBLEM 2
Postpartum cardiomyopathy

## 2022-10-16 NOTE — PROGRESS NOTE ADULT - SUBJECTIVE AND OBJECTIVE BOX
Wyatt Guerra [email or web address]    INTERVAL HPI/OVERNIGHT EVENTS: The patient was administered oral metoprolol and was weaned from hydralazine. She ambulated about the unit without difficulty.     SUBJECTIVE: Patient seen and examined at bedside.     CONSTITUTIONAL: No weakness, fevers, or chills  EYES/ENT: No visual changes; no dizziness   NECK: No pain, no stiffness  RESPIRATORY: No cough, no shortness of breath; no orthopnea   CARDIOVASCULAR: No chest pain, no palpitations  GASTROINTESTINAL: No abdominal or epigastric pain. No nausea, vomiting, or hematemesis; No diarrhea or constipation. No melena or hematochezia.  GENITOURINARY: No dysuria, frequency, or hematuria  NEUROLOGICAL: No numbness, no weakness  EXTREMITIES: No swelling in either lower extremity   SKIN: No itching, no rashes    OBJECTIVE:    VITAL SIGNS:  T(C): 36.8 (16 Oct 2022 11:30), Max: 36.9 (15 Oct 2022 20:30)  T(F): 98.3 (16 Oct 2022 11:30), Max: 98.5 (15 Oct 2022 20:30)  HR: 113 (16 Oct 2022 11:30) (98 - 122)  BP: 95/69 (16 Oct 2022 11:30) (91/58 - 106/73)  RR: 18 (16 Oct 2022 11:30) (18 - 18)  SpO2: 96% (16 Oct 2022 11:30) (95% - 96%)    O2 Parameters below as of 16 Oct 2022 11:30  Patient On (Oxygen Delivery Method): room air      10-15 @ 07:01  -  10-16 @ 07:00  --------------------------------------------------------  IN: 260 mL / OUT: 0 mL / NET: 260 mL      CAPILLARY BLOOD GLUCOSE      PHYSICAL EXAM:    General: NAD; awake and alert; breathing comfortably   HEENT: PERRL grossly, clear conjunctiva  Neck: No jvd, no lymphadenopathy  Respiratory: CTA b/l, no rales, no wheezes; equal respiratory excursion   Cardiovascular: +S1/S2; RRR; non displaced pmi   Abdomen: soft, NT/ND; +BS x4  Extremities: WWP, 2+ peripheral pulses b/l; no LE edema  Skin: normal color and turgor; no rash  Neurological: No gross motor or sensory deficits; coordination intact   Extremities: No bilateral lower extremity edema     MEDICATIONS:  MEDICATIONS  (STANDING):  enalapril 7.5 milliGRAM(s) Oral two times a day  enoxaparin Injectable 40 milliGRAM(s) SubCutaneous every 24 hours  ferrous    sulfate 325 milliGRAM(s) Oral daily  furosemide    Tablet 40 milliGRAM(s) Oral daily  metoprolol succinate ER 25 milliGRAM(s) Oral daily  senna 2 Tablet(s) Oral at bedtime    MEDICATIONS  (PRN):  acetaminophen     Tablet .. 650 milliGRAM(s) Oral every 6 hours PRN Mild Pain (1 - 3), Moderate Pain (4 - 6)  ondansetron Injectable 4 milliGRAM(s) IV Push four times a day PRN Nausea and/or Vomiting  pantoprazole    Tablet 40 milliGRAM(s) Oral before breakfast PRN acid reflux      ALLERGIES:  Allergies    No Known Allergies    Intolerances        LABS:                        10.9   5.98  )-----------( 401      ( 16 Oct 2022 07:15 )             34.6     10-16    140  |  105  |  23  ----------------------------<  70  4.0   |  23  |  0.70    Ca    8.7      16 Oct 2022 07:10  Phos  3.9     10-16  Mg     2.1     10-16            RADIOLOGY & ADDITIONAL TESTS: Reviewed.

## 2022-10-16 NOTE — DISCHARGE NOTE NURSING/CASE MANAGEMENT/SOCIAL WORK - NSDCPEFALRISK_GEN_ALL_CORE
For information on Fall & Injury Prevention, visit: [email or web address] OR  [email or web address] OR  https://www.cdc.gov/steadi/patient.html

## 2022-10-16 NOTE — PROGRESS NOTE ADULT - SUBJECTIVE AND OBJECTIVE BOX
C A R D I O L O G Y  **********************************  DATE OF SERVICE: 10-16-22    Patient denies chest pain or shortness of breath.   Review of symptoms otherwise negative.    MEDICATIONS:  acetaminophen     Tablet .. 650 milliGRAM(s) Oral every 6 hours PRN  enalapril 7.5 milliGRAM(s) Oral two times a day  enoxaparin Injectable 40 milliGRAM(s) SubCutaneous every 24 hours  ferrous    sulfate 325 milliGRAM(s) Oral daily  furosemide    Tablet 40 milliGRAM(s) Oral daily  metoprolol succinate ER 25 milliGRAM(s) Oral daily  ondansetron Injectable 4 milliGRAM(s) IV Push four times a day PRN  pantoprazole    Tablet 40 milliGRAM(s) Oral before breakfast PRN  senna 2 Tablet(s) Oral at bedtime      LABS:                        10.9   5.98  )-----------( 401      ( 16 Oct 2022 07:15 )             34.6       Hemoglobin: 10.9 g/dL (10-16 @ 07:15)  Hemoglobin: 11.1 g/dL (10-15 @ 07:34)  Hemoglobin: 11.2 g/dL (10-14 @ 07:16)  Hemoglobin: 10.1 g/dL (10-13 @ 02:14)  Hemoglobin: 10.4 g/dL (10-12 @ 03:58)      10-16    140  |  105  |  23  ----------------------------<  70  4.0   |  23  |  0.70    Ca    8.7      16 Oct 2022 07:10  Phos  3.9     10-16  Mg     2.1     10-16      Creatinine Trend: 0.70<--, 0.80<--, 0.70<--, 0.73<--, 0.67<--, 0.66<--    COAGS:           PHYSICAL EXAM:  T(C): 36.7 (10-16-22 @ 04:08), Max: 36.9 (10-15-22 @ 20:30)  HR: 107 (10-16-22 @ 04:08) (98 - 122)  BP: 106/73 (10-16-22 @ 04:08) (91/58 - 116/70)  RR: 18 (10-16-22 @ 04:08) (18 - 18)  SpO2: 95% (10-16-22 @ 04:08) (95% - 96%)  Wt(kg): --    I&O's Summary    15 Oct 2022 07:01  -  16 Oct 2022 07:00  --------------------------------------------------------  IN: 260 mL / OUT: 0 mL / NET: 260 mL         General: Well nourished in no acute distress. Alert and Oriented * 3.   Head: Normocephalic and atraumatic.   Neck: No JVD. No bruits. Supple. Does not appear to be enlarged.   Cardiovascular: tachycardic but no murmurs  Lungs: CTA b/l. decreaed sounds  Abdomen: + BS, soft. Non tender. Non distended. No rebound. No guarding.   Extremities: No clubbing/cyanosis/edema.   Neurologic: Moves all four extremities. Full range of motion.   Skin: Warm and moist. The patient's skin has normal elasticity and good skin turgor.   Psychiatric: Appropriate mood and affect.  Musculoskeletal: Normal range of motion, normal strength     TELEMETRY: -120    < from: Transthoracic Echocardiogram (10.12.22 @ 08:02) >  Ejection Fraction Visual Estimate: 15-20 %  CONCLUSIONS:  1. Tethered mitral valve leaflets. Mild mitral  regurgitation.  2. Moderately dilated left atrium.  LA volume index = 42  cc/m2.  3. Normal left ventricular internal dimensions and wall  thicknesses.  4. Severe global left ventricular systolic dysfunction.  5. Grade III-IV  diastolic dysfunction(severe).  6. Normal right ventricular size and function.  7. Bilateral pleural effusions.    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 Transferred to [facility] CCU for further management now downgraded to telemetry floor.    - appears euvolemic on exam  - c/w enalapril and toprol  - she does not plan on breastfeeding. Consider switching to entresto as outpatient  - EP allan appreciated  - Follow up HF recs    Joyce Koroma MD  Pager: [phone]

## 2022-10-16 NOTE — DISCHARGE NOTE PROVIDER - NSDCMRMEDTOKEN_GEN_ALL_CORE_FT
chlorhexidine 2% topical pad: Apply topically to affected area once a day  enoxaparin: 40 milligram(s) injectable once a day  furosemide 100 mg/100 mL-0.9% intravenous solution: 40 milliliter(s) intravenous 2 times a day  hydrALAZINE 10 mg oral tablet: 1 tab(s) orally every 8 hours  isosorbide dinitrate 10 mg oral tablet: 1 tab(s) orally 3 times a day  ondansetron 2 mg/mL injectable solution: injectable every 8 hours, As Needed  pantoprazole 40 mg intravenous injection: 40 milligram(s) intravenous once a day   enalapril 2.5 mg oral tablet: 3 tab(s) orally 2 times a day  ferrous sulfate 325 mg (65 mg elemental iron) oral tablet: 1 tab(s) orally once a day  Lasix 40 mg oral tablet: 1 tab(s) orally once a day  metoprolol succinate 25 mg oral tablet, extended release: 1 tab(s) orally once a day  pantoprazole 40 mg intravenous injection: 40 milligram(s) intravenous once a day

## 2022-10-16 NOTE — PROGRESS NOTE ADULT - PROBLEM SELECTOR PLAN 2
likely PPCM i/s/o recent pregnancy  -r/o ICM; Trops peaked; consider ischemic workup prior to d/c to r/o SCAD/ICM  EF 15-20% seen on echo on 10/12  GDMT by HF (see above)  EP to see patient outpatient for eval of ICD for primary prevention of SCD; consider asking prior to d/c if she needs lifevest/WCD  expected improvement in 3 months  To follow with Dr. Mederos in 1 week

## 2022-10-16 NOTE — PROGRESS NOTE ADULT - ASSESSMENT
35 y/o F PMH Gestational DM (diet controlled) and gestational HTN (not on meds) presented from [facility] for further management of ADHF (EF 15%) of unknown etiology most c/f Postpartum cardiomyopathy with volume overload s/p IV Lasix 40 x2. Respiratory status improving, now on room air. Cardiology, OB Cardiology, EP following. At this time, the patient is clinically stable and ready for discharge today with outpatient follow-up with the heart failure team.

## 2022-10-16 NOTE — PROGRESS NOTE ADULT - REASON FOR ADMISSION
Postpartum Cardiomyopathy

## 2022-10-16 NOTE — PROGRESS NOTE ADULT - PROBLEM SELECTOR PLAN 1
2/2 PPCM with EF 15-20% seen on 10/12 echo  -warm and wet   -c/b AHRF; now improved   -currently on Lasix 40 PO  -Strict I/Os  -Daily standing weights  -c/w enalapril 7.5 mg BID and metoprolol succinate 25 mg daily   -Not administering entresto, spironolactone, or sglt-2 inhibitor at this time in the interest of allowing for safe breast feeding   -Patient is ready for discharge today and will follow up with Dr. Mederos of heart failure after discharge

## 2022-10-16 NOTE — DISCHARGE NOTE PROVIDER - HOSPITAL COURSE
37 y/o F PMH Gestational DM (diet controlled) and gestational HTN (not on meds) presented from [facility] for further management of ADHF (EF 15%) of unknown etiology most c/f Postpartum cardiomyopathy with volume overload resulting in AHRF. Patient improved with IV Lasix, now on room air. Started on enalapril 7.5mg BID and metoprolol 12.5 QD by heart failure team for GDMT. Tolerated well.

## 2022-10-16 NOTE — PROGRESS NOTE ADULT - PROBLEM SELECTOR PLAN 3
likely appropriate physiologic response to low CO with HF  -c/w telemetry  -consider starting metoprolol this PM

## 2022-10-16 NOTE — PROGRESS NOTE ADULT - ATTENDING COMMENTS
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 She was initially on Bipap and admitted to the CCU but with IV diuresis, she improved and was transferred to the floors. She has been transitioned to PO Lasix and her GDMT is being titrated by the cardiology team. She does have sinus tachycardia which is likely physiologic response to her low cardiac output. She is medically cleared for discharge today with cardiology follow up. Rest of plan as above. Total time spent discharge planning 47 minutes.    Wyatt Convissar, DO  Pager [phone]  If no answer [phone]

## 2022-10-16 NOTE — PROGRESS NOTE ADULT - SUBJECTIVE AND OBJECTIVE BOX
EP     HISTORY OF PRESENT ILLNESS: HPI:  Patient is a 37 yo Female, hx Gestational DM (diet controlled), Gestational HTN (did not require meds), recent LSCS 10/4/2022, Chronic anemia, presenting with gradual progressive shortness of breath and bilateral lower extremity swelling since returning home after LSCS. Pt's O2 saturation at home was recorded to be 80%, hence patient was brought to the ED. Pt currently on Bipap, reports no chest pain, fevers, abdominal pain, nausea, vomiting, dysuria. Pt only taking vitamins at home.   In the ED, vitals were  Temp 99.2, -130s, /86, RR 26, 92% RA> given tachypnea and tachycardia> transitioned to Bipap  RR 12, FiO2 100% saturating 99%  Xray chest pulm edema s/p 20 mg IV Lasix x 2 doses> 2500cc Carrion output, started on low dose Nitro drip, titrate to   Pt was at [facility] where echo revealed acutely reduced EF 15-20%. Pt was diuresed with Lasix 20 x2 and weaned off Bipap. She has remained on 6L NC with acceptable O2. She was transferred to [facility] CICU for continued management.  (13 Oct 2022 02:08)    10/13- resting in [facility] CICU, doing well with IV lasix and afterload reduction w/ hydralazine.  Besides being short of breath, no angina, no palpitations, no near-syncope.  No family history of similar cardiomyopathy.  A 10 pt ROS is otherwise negative.   10/14- uneventful overnight, no palpitations, no lightheadedness.  10/15  no events overnight  10/16 toledrating toprol ,  no events overnight ,            acetaminophen     Tablet .. 650 milliGRAM(s) Oral every 6 hours PRN  enalapril 7.5 milliGRAM(s) Oral two times a day  enoxaparin Injectable 40 milliGRAM(s) SubCutaneous every 24 hours  ferrous    sulfate 325 milliGRAM(s) Oral daily  furosemide    Tablet 40 milliGRAM(s) Oral daily  metoprolol succinate ER 25 milliGRAM(s) Oral daily  ondansetron Injectable 4 milliGRAM(s) IV Push four times a day PRN  pantoprazole    Tablet 40 milliGRAM(s) Oral before breakfast PRN  senna 2 Tablet(s) Oral at bedtime                            10.9   5.98  )-----------( 401      ( 16 Oct 2022 07:15 )             34.6       Hemoglobin: 10.9 g/dL (10-16 @ 07:15)  Hemoglobin: 11.1 g/dL (10-15 @ 07:34)  Hemoglobin: 11.2 g/dL (10-14 @ 07:16)  Hemoglobin: 10.1 g/dL (10-13 @ 02:14)  Hemoglobin: 10.4 g/dL (10-12 @ 03:58)      10-16    140  |  105  |  23  ----------------------------<  70  4.0   |  23  |  0.70    Ca    8.7      16 Oct 2022 07:10  Phos  3.9     10-16  Mg     2.1     10-16      Creatinine Trend: 0.70<--, 0.80<--, 0.70<--, 0.73<--, 0.67<--, 0.66<--    COAGS:           T(C): 36.7 (10-16-22 @ 04:08), Max: 36.9 (10-15-22 @ 20:30)  HR: 107 (10-16-22 @ 04:08) (98 - 122)  BP: 106/73 (10-16-22 @ 04:08) (91/58 - 116/70)  RR: 18 (10-16-22 @ 04:08) (18 - 18)  SpO2: 95% (10-16-22 @ 04:08) (95% - 96%)  Wt(kg): --    I&O's Summary    15 Oct 2022 07:01  -  16 Oct 2022 07:00  --------------------------------------------------------  IN: 260 mL / OUT: 0 mL / NET: 260 mL      Appearance: Normal appearing young woman in no acute distress  HEENT:   Normal oral mucosa, PERRL, EOMI	  Lymphatic: No lymphadenopathy, ++LE edema  Cardiovascular: Rapid regular S1 S2, +JVD, No murmurs , Peripheral pulses palpable 2+ bilaterally  Respiratory: Lungs clear to auscultation, normal effort 	  Gastrointestinal:  Soft, Non-tender, + BS	  Skin: No rashes, No ecchymoses, No cyanosis, warm to touch  Musculoskeletal: Normal range of motion, normal strength  Psychiatry:  Mood & affect appropriate    TELEMETRY: sinus tachycardia	    ECG: sinus tachycardia, normal intervals.  Echo:  < from: Transthoracic Echocardiogram (10.12.22 @ 08:02) >  Dimensions:     Normal Values:  LA:     4.3 cm    2.0 - 4.0 cm  Ao:     2.6 cm    2.0 - 3.8 cm  SEPTUM: 0.8 cm    0.6 - 1.2 cm  PWT:    0.8 cm    0.6 - 1.1 cm  LVIDd:  5.6 cm    3.0 - 5.6 cm  LVIDs:  5.1 cm    1.8 - 4.0 cm      Derived Variables:  LVMI: 98 g/m2  RWT: 0.28  Ejection Fraction Visual Estimate: 15-20 %    ------------------------------------------------------------------------  OBSERVATIONS:  Mitral Valve: Tethered mitral valve leaflets. Mild mitral  regurgitation.  Aortic Root: Normal aortic root.  Aortic Valve: Aortic valve leaflet morphology not well  visualized, opens normally.  Left Atrium: Moderately dilated left atrium.  LA volume  index = 42 cc/m2.  Left Ventricle: Severe global left ventricular systolic  dysfunction. Normal left ventricular internal dimensions  and wall thicknesses. Grade III-IV  diastolic dysfunction  (severe).  Right Heart: Normal right atrium. Normal right ventricular  size and function. There is mild tricuspid regurgitation.  There is mild pulmonic regurgitation.  Pericardium/PleuraNormal pericardium with no pericardial  effusion. Bilateral pleural effusions.  Hemodynamic: Incomplete tricuspid regurgitation jet  precludes accurate assessment of pulmonary artery systolic  pressure.    < end of copied text >    ASSESSMENT/PLAN: 	36y Female with acute systolic heart failure.  Is very recently post partum with  delivery, new orthopnea, LE edema, SOB.    tolerating Toprol   Echo with severe LV systolic dysfunction.  LV dimensions dilated to upper limit of normal.  Valves look normal.  In all likelihood, a peripartum cardiomyopathy.  Discussed benefits to cessation of pumping for feeding (can still 'pump & dump') in order to prioritize best CHF mdications.  As she is very young with thus far no arrhythmia on telemetry, would give her longer than 3 months to recover her ejection fraction.  Outpatient evaluation for a primary prevention ICD, with serial echocardiograms.  ~6 months timeframe.  Counselling and family planning, re: avoiding becoming pregnant again.

## 2022-10-16 NOTE — PHYSICAL THERAPY INITIAL EVALUATION ADULT - PERTINENT HX OF CURRENT PROBLEM, REHAB EVAL
Pt is a 35 y/o female admitted to [facility] on 10/16/22  hx Gestational DM (diet controlled), Gestational HTN (did not require meds), recent LSCS 10/4/2022, Chronic anemia, presenting with gradual progressive shortness of breath and bilateral lower extremity swelling since returning home after LSCS. Pt's O2 saturation at home was recorded to be 80%, hence patient was brought to the ED.  Pt was at [facility] where echo revealed acutely reduced EF 15-20%. Pt was diuresed with Lasix 20 x2 and weaned off Bipap. She has remained on 6L NC with acceptable O2. She was transferred to [facility] CICU for continued management. TTE: Tethered mitral valve leaflets. Mild mitral regurgitation. Moderately dilated left atrium. Normal left ventricular internal dimensions and wall thicknesses. Severe global left ventricular systolic dysfunction.Grade III-IV  diastolic dysfunction (severe).

## 2022-10-16 NOTE — DISCHARGE NOTE PROVIDER - CARE PROVIDER_API CALL
Sandra Mederos)  Adv Heart Fail Trnsplnt Cardio; Cardiology; Internal Medicine  [address] [place] [address]  Phone: [phone]  Fax: [phone]  Follow Up Time: 1 week

## 2022-10-16 NOTE — PHYSICAL THERAPY INITIAL EVALUATION ADULT - ADDITIONAL COMMENTS
Pt lives in a private house with spouse with 3 steps to enter and no steps inside to negotiate. Pt was Ind with all ADLs and amb without AD.

## 2022-10-16 NOTE — PROGRESS NOTE ADULT - PROBLEM SELECTOR PROBLEM 1
Acute decompensated heart failure

## 2022-10-16 NOTE — DISCHARGE NOTE NURSING/CASE MANAGEMENT/SOCIAL WORK - PATIENT PORTAL LINK FT
You can access the FollowMyHealth Patient Portal offered by [facility] by registering at the following website: [email or web address]. By joining [employer] [facility]’s FollowMyHealth portal, you will also be able to view your health information using other applications (apps) compatible with our system.

## 2022-10-16 NOTE — DISCHARGE NOTE PROVIDER - NSDCCPCAREPLAN_GEN_ALL_CORE_FT
PRINCIPAL DISCHARGE DIAGNOSIS  Diagnosis: Postpartum cardiomyopathy  Assessment and Plan of Treatment: You came to the hospital for trouble breathing and you were found to have fluid in your lungs. The fluid in your lungs came from heart failure likely related to your recent pregnancy. You were treated with diuretics and had improvement in your breathing. You were also started on medications to help improve the heart function.   Please continue to take your enalapril and metoprol as prescribed. Please follow up with Dr. Sandra Mederos within 1 week of discharge.   If you have recurrent shortness of breath or chest pain, please come back to the hospital.       PRINCIPAL DISCHARGE DIAGNOSIS  Diagnosis: Postpartum cardiomyopathy  Assessment and Plan of Treatment: You came to the hospital for trouble breathing and you were found to have fluid in your lungs. You developed fluid in your lungs because your heart was not pumping as efficiently as we would have liked it to. Everyone's heart is  into two parts. The left side of the heart receives blood from the lungs and pumps it to the rest of the body. Because your left side of your heart was not pumping very well. It did not fill with blood well, so fluid built up in the lungs making it difficult for you to breathe. You were treated with diuretics and had improvement in your breathing. You were also started on medications to help improve the heart function.   Please continue to take your enalapril and metoprol as prescribed. Please follow up with Dr. Sandra Mederos within 1 week of discharge.   If you have recurrent shortness of breath or chest pain, please come back to the hospital.

## 2022-10-16 NOTE — PROGRESS NOTE ADULT - PROVIDER SPECIALTY LIST ADULT
Cardio-OB
OB
OB
[place]
Cardio-OB
Cardiology
Cardiology
Electrophysiology
Electrophysiology
Internal Medicine
Cardiology
Electrophysiology
Heart Failure
Internal Medicine
Cardiology
Internal Medicine
Internal Medicine

## 2022-10-16 NOTE — PROGRESS NOTE ADULT - NS ATTEND OPT1 GEN_ALL_CORE
I attest my time as attending is greater than 50% of the total combined time spent on qualifying patient care activities by the PA/NP and attending.
I independently performed the documented:
I independently performed the documented:
I attest my time as attending is greater than 50% of the total combined time spent on qualifying patient care activities by the PA/NP and attending.

## 2022-10-17 LAB
CULTURE RESULTS: SIGNIFICANT CHANGE UP
CULTURE RESULTS: SIGNIFICANT CHANGE UP
SPECIMEN SOURCE: SIGNIFICANT CHANGE UP
SPECIMEN SOURCE: SIGNIFICANT CHANGE UP

## 2022-10-18 ENCOUNTER — NON-APPOINTMENT (OUTPATIENT)
Age: 37
End: 2022-10-18

## 2022-10-21 ENCOUNTER — APPOINTMENT (OUTPATIENT)
Dept: HEART FAILURE | Facility: CLINIC | Age: 37
End: 2022-10-21

## 2022-10-25 ENCOUNTER — APPOINTMENT (OUTPATIENT)
Dept: HEART FAILURE | Facility: CLINIC | Age: 37
End: 2022-10-25

## 2022-10-25 VITALS
WEIGHT: 122.25 LBS | HEIGHT: 61 IN | HEART RATE: 111 BPM | BODY MASS INDEX: 23.08 KG/M2 | DIASTOLIC BLOOD PRESSURE: 66 MMHG | OXYGEN SATURATION: 99 % | SYSTOLIC BLOOD PRESSURE: 95 MMHG

## 2022-10-25 PROCEDURE — 99215 OFFICE O/P EST HI 40 MIN: CPT

## 2022-10-25 RX ORDER — ENALAPRIL MALEATE 2.5 MG/1
2.5 TABLET ORAL TWICE DAILY
Refills: 0 | Status: DISCONTINUED | COMMUNITY
Start: 2022-10-25 | End: 2022-10-25

## 2022-10-25 RX ORDER — LANCETS 33 GAUGE
EACH MISCELLANEOUS
Qty: 4 | Refills: 2 | Status: DISCONTINUED | COMMUNITY
Start: 2022-08-08 | End: 2022-10-25

## 2022-10-25 RX ORDER — URINE ACETONE TEST STRIPS
STRIP MISCELLANEOUS
Qty: 1 | Refills: 2 | Status: DISCONTINUED | COMMUNITY
Start: 2022-08-08 | End: 2022-10-25

## 2022-10-25 RX ORDER — NITROFURANTOIN (MONOHYDRATE/MACROCRYSTALS) 25; 75 MG/1; MG/1
100 CAPSULE ORAL TWICE DAILY
Qty: 14 | Refills: 0 | Status: DISCONTINUED | COMMUNITY
Start: 2022-06-02 | End: 2022-10-25

## 2022-10-25 RX ORDER — BLOOD-GLUCOSE METER
W/DEVICE KIT MISCELLANEOUS
Qty: 1 | Refills: 0 | Status: DISCONTINUED | COMMUNITY
Start: 2022-08-08 | End: 2022-10-25

## 2022-10-25 RX ORDER — BLOOD-GLUCOSE METER
KIT MISCELLANEOUS 4 TIMES DAILY
Qty: 1 | Refills: 3 | Status: DISCONTINUED | COMMUNITY
Start: 2022-08-08 | End: 2022-10-25

## 2022-10-25 RX ORDER — FUROSEMIDE 40 MG/1
40 TABLET ORAL DAILY
Qty: 30 | Refills: 5 | Status: ACTIVE | COMMUNITY
Start: 2022-10-25

## 2022-10-25 RX ORDER — CALCIUM CARBONATE 300MG(750)
0.4-32.5 TABLET,CHEWABLE ORAL
Qty: 30 | Refills: 11 | Status: DISCONTINUED | COMMUNITY
Start: 2022-02-07 | End: 2022-10-25

## 2022-10-25 NOTE — HISTORY OF PRESENT ILLNESS
[FreeTextEntry1] : Ms. Roberson is a 35 y/o F with PMH of asthma, gestational DM (diet controlled), gestational HTN (not requiring medication, outpatient BP generally 130/80s), and mild COVID infection in 2022, who had an elective  on 10/4/22 which was uncomplicated, with newly diagnosed HFrEF (LVEF 15-20%, LVIDd 5.6 cm), which is likely a peripartum cardiomyopathy, who presents today for follow-up of her HF after recent hospitalization.\par \par Since discharge on 10/16/22 she has been doing well. She reports generalized fatigue from not sleeping well due to her , but denies any orthopnea or PND. She denies any SOB ambulating around her house or with ADL's. Her weight has been stable between 122-124 lbs and her BP is usually 90s/50s. She endorses an occasional cough (which may be due to enalapril), but denies any CP, palpitations, LH/dizziness, abdominal discomfort, or LE edema. His appetite is normal and she has been limiting her fluid intake to about 2-3 cups per day. She is taking her medications as prescribed and has stopped breastfeeding.

## 2022-10-25 NOTE — CARDIOLOGY SUMMARY
[de-identified] : \par 10/12/22 TTE: LVEF 15-20%, LVIDd 5.6cm, grade III-IV DD, nl RV size and function, mild MR, mild TR.\par

## 2022-10-25 NOTE — PATIENT PROFILE ADULT - FUNCTIONAL ASSESSMENT - DAILY ACTIVITY SECTION LABEL
INITIAL CASE MANAGEMENT ASSESSMENT    Reviewed chart, met with patient to assess possible discharge needs. Explained Case Management role/services. Living Situation: Lives with her Sister in a 2 family on the first floor with 6 steps to enter. ADLs: Independent     DME: None    PT/OT Recs: None     Active Services: None     Transportation: Non-/Family will transport     Medications: [facility] on [address]/No barriers    PCP: CEASAR Vilchis      HD/PD: N?A    PLAN/COMMENTS: Plan return to home with sister. SW/CM provided contact information for patient or family to call with any questions. SW/CM will follow and assist as needed.    Electronically signed by Mason Escamilla RN on 10/25/2022 at 12:06 PM
.

## 2022-10-25 NOTE — ASSESSMENT
[FreeTextEntry1] : 35 y/o F with PMH of asthma, gestational DM (diet controlled), gestational HTN (not requiring medication, outpatient BP generally 130/80s), and mild COVID infection in 2022, who had an elective  on 10/4/22 which was uncomplicated, with newly diagnosed HFrEF (LVEF 15-20%, LVIDd 5.6 cm), which is likely a peripartum cardiomyopathy, who presents today for follow-up of her HF after recent hospitalization. She is currently ACC/AHA Stage C, NYHA class II, euvolemic, and low normotensive. Labs prior to discharge on 10/16 showed K 4.0 and SCr 0.70. I have recommended the following:\par \par # HFrEF\par - Patient states that she has decided not to breastfeed anymore, so will stop her enalapril now and 36 hours after her last dose which was this morning, she will start Entresto 24-26 mg BID\par - She will increase Toprol XL to 50 mg daily (taken mid-day)\par - She is aware to call if she has any LH/dizziness or her SBP is <90\par - She may drink up to 2L of fluid per day\par - Repeat labs ordered in 1 week to assess renal function and K on Entresto\par - Eventual SGLT2i and MRA\par - Once she is on maximally tolerated GDMT, will repeat TTE and if her LVEF remains <35% will refer to EP for primary prevention ICD\par \par # Peripartum cardiomyopathy\par - GDMT as above\par - discussed importance of birth control as another pregnancy prior to recovery of her cardiomyopathy would not be recommended\par \par RTC with the HF NP in 2 weeks via telehealth and with Dr. Mederos in 1 month.

## 2022-10-28 ENCOUNTER — NON-APPOINTMENT (OUTPATIENT)
Age: 37
End: 2022-10-28

## 2022-10-31 ENCOUNTER — NON-APPOINTMENT (OUTPATIENT)
Age: 37
End: 2022-10-31

## 2022-11-01 ENCOUNTER — APPOINTMENT (OUTPATIENT)
Dept: HEART FAILURE | Facility: CLINIC | Age: 37
End: 2022-11-01

## 2022-11-02 ENCOUNTER — APPOINTMENT (OUTPATIENT)
Dept: OBGYN | Facility: CLINIC | Age: 37
End: 2022-11-02

## 2022-11-02 VITALS
TEMPERATURE: 98 F | DIASTOLIC BLOOD PRESSURE: 70 MMHG | WEIGHT: 122 LBS | OXYGEN SATURATION: 99 % | BODY MASS INDEX: 23.03 KG/M2 | HEART RATE: 81 BPM | HEIGHT: 61 IN | SYSTOLIC BLOOD PRESSURE: 100 MMHG | RESPIRATION RATE: 15 BRPM

## 2022-11-02 DIAGNOSIS — O26.00 EXCESSIVE WEIGHT GAIN IN PREGNANCY, UNSPECIFIED TRIMESTER: ICD-10-CM

## 2022-11-02 DIAGNOSIS — O09.519 SUPERVISION OF ELDERLY PRIMIGRAVIDA, UNSPECIFIED TRIMESTER: ICD-10-CM

## 2022-11-02 DIAGNOSIS — Z98.891 ENCOUNTER FOR ROUTINE POSTPARTUM FOLLOW-UP: ICD-10-CM

## 2022-11-02 DIAGNOSIS — O24.419 GESTATIONAL DIABETES MELLITUS IN PREGNANCY, UNSPECIFIED CONTROL: ICD-10-CM

## 2022-11-02 DIAGNOSIS — Z34.93 ENCOUNTER FOR SUPERVISION OF NORMAL PREGNANCY, UNSPECIFIED, THIRD TRIMESTER: ICD-10-CM

## 2022-11-02 PROCEDURE — 0503F POSTPARTUM CARE VISIT: CPT

## 2022-11-02 NOTE — HISTORY OF PRESENT ILLNESS
[Postpartum Follow Up] : postpartum follow up [Delivery Date: ___] : on [unfilled] [Primary C/S] : delivered by  section [None] : No associated symptoms are reported [Clean/Dry/Intact] : clean, dry and intact [Erythema] : not erythematous [Swelling] : not swollen [Dehiscence] : not dehisced [Doing Well] : is doing well [No Sign of Infection] : is showing no signs of infection [Excellent Pain Control] : has excellent pain control [No Lazear] : to avoid sexual intercourse [No Tampons/Suppositories] : against using tampons or vaginal suppositories [No Sports] : not to participate in sports [FreeTextEntry8] : pt here for incision check and f/up of cardiomyopathy. doing much better. no sob. followed by cardiology.  [de-identified] : postpartum c/b peripartum cardiomyopathy, s/p ICU at [facility] then transferred to CCU at [facility]. having occasional depressive symptoms. no SI/HI.  [de-identified] : [] [facility] information offered. pt declined at this time, doesn't think she needs it as symptoms not severe. [] contraception briefly discussed. given cardiomyopathy i do not recommended any more pregnancies. M consutatlon offered, pt unsure if she wants at this time. her and  decided no more pregnancies. discussed pt has limited contraception options, recommended vasectomy for . pt to think about it and disucss with  . will discuss further during next visit.

## 2022-11-03 ENCOUNTER — APPOINTMENT (OUTPATIENT)
Dept: CARDIOLOGY | Facility: CLINIC | Age: 37
End: 2022-11-03

## 2022-11-03 PROCEDURE — 99203 OFFICE O/P NEW LOW 30 MIN: CPT | Mod: 95

## 2022-11-03 RX ORDER — CHLORHEXIDINE GLUCONATE 4 %
325 (65 FE) LIQUID (ML) TOPICAL
Qty: 90 | Refills: 0 | Status: DISCONTINUED | COMMUNITY
Start: 2022-10-25 | End: 2022-11-03

## 2022-11-03 NOTE — HISTORY OF PRESENT ILLNESS
[FreeTextEntry1] : Ms. RANDY ROBERSON 36 year old F  s/p elective c/section @ 39w1 day with peripartum cardiomyopathy  is here Oct 28, 2022 to establish care in the [facility] program. \par Ms. Roberson is a 35 y/o F with PMH of asthma, gestational DM (diet controlled), gestational HTN (not requiring medication, mild COVID infection in 2022, underwent an elective  on 10/4/22 @ 39 weeks GA and was found to have newly diagnosed peripartum cardiomyopathy with an (LVEF 15-20%, LVIDd 5.6 cm). Denies chest pain, shortness of breath, dizziness, lightheadedness, palpitations or near syncope or syncope, orthopnea, PND and increasing lower extremity edema. She reports a weight gain of 2 lbs today. Patient not nursing. Adjusting to Entresto 24/26 ( currently on 1/2 tab BID) \par Delivery date - 10/4/22 \par \par \par Cardiomyopathy: \par Managed by HF team\par Continue Entresto 24/26 mg ( 1/2 tab ) bid ( advised to hold if SBP <90 /50 per HF team ) \par Continue Metop XL 50 Q noon \par Lasix 40 mg prn for weight gain > 2 lbs \par Repeat TTE in 3 months. \par If EF remains depressed despite OMT, will consider device therapy for primary prevention of SCD.\par \par ****Patient requesting to follow up with HF only for sometime as she is finding it difficult to keep up with all providers. ****\par \par  \par \par

## 2022-11-03 NOTE — DISCUSSION/SUMMARY
[FreeTextEntry1] : In summary, Ms. Roberson is a 35 y/o F with PMH of asthma, gestational DM (diet controlled), gestational HTN (not requiring medication, mild COVID infection in 2022, underwent an elective  on 10/4/22 @ 39 weeks GA and was found to have newly diagnosed peripartum cardiomyopathy with an (LVEF 15-20%, LVIDd 5.6 cm).\par \par Cardiomyopathy: \par Managed by HF team\par Continue Entresto 24/26 mg ( 1/2 tab ) bid ( advised to hold if SBP <90 /50 per HF team ) \par Continue Metop XL 50 Q noon \par Lasix 40 mg prn for weight gain > 2 lbs \par Repeat TTE in 3 months. \par If EF remains depressed despite OMT, will consider device therapy for primary prevention of SCD.\par \par ****Patient requesting to follow up with HF only for sometime as she is finding it difficult to keep up with all providers. ****\par Patient advised to call with any concerns. \par \par  \par \par  \par \par

## 2022-11-07 ENCOUNTER — NON-APPOINTMENT (OUTPATIENT)
Age: 37
End: 2022-11-07

## 2022-11-14 LAB
ANION GAP SERPL CALC-SCNC: 12 MMOL/L
BUN SERPL-MCNC: 11 MG/DL
CALCIUM SERPL-MCNC: 9.1 MG/DL
CHLORIDE SERPL-SCNC: 107 MMOL/L
CO2 SERPL-SCNC: 25 MMOL/L
CREAT SERPL-MCNC: 0.69 MG/DL
EGFR: 116 ML/MIN/1.73M2
GLUCOSE SERPL-MCNC: 101 MG/DL
MAGNESIUM SERPL-MCNC: 2 MG/DL
NT-PROBNP SERPL-MCNC: 2367 PG/ML
POTASSIUM SERPL-SCNC: 4.1 MMOL/L
SODIUM SERPL-SCNC: 143 MMOL/L

## 2022-11-17 ENCOUNTER — APPOINTMENT (OUTPATIENT)
Dept: HEART FAILURE | Facility: CLINIC | Age: 37
End: 2022-11-17

## 2022-11-17 PROCEDURE — 99442: CPT | Mod: 95

## 2022-12-02 ENCOUNTER — APPOINTMENT (OUTPATIENT)
Dept: HEART FAILURE | Facility: CLINIC | Age: 37
End: 2022-12-02

## 2022-12-02 VITALS
TEMPERATURE: 98.06 F | DIASTOLIC BLOOD PRESSURE: 65 MMHG | SYSTOLIC BLOOD PRESSURE: 101 MMHG | WEIGHT: 199 LBS | BODY MASS INDEX: 37.57 KG/M2 | OXYGEN SATURATION: 99 % | RESPIRATION RATE: 14 BRPM | HEART RATE: 86 BPM | HEIGHT: 61 IN

## 2022-12-02 PROCEDURE — 99215 OFFICE O/P EST HI 40 MIN: CPT

## 2022-12-19 NOTE — PHYSICAL EXAM
[Normal Venous Pressure] : normal venous pressure [Normal S1, S2] : normal S1, S2 [No Murmur] : no murmur [No Rub] : no rub [No Gallop] : no gallop [Normal] : alert and oriented, normal memory [Well Nourished] : well nourished [No Xanthelasma] : no xanthelasma [Clear Lung Fields] : clear lung fields [Soft] : abdomen soft [de-identified] : tachycardic [de-identified] : warm peripherally

## 2022-12-19 NOTE — HISTORY OF PRESENT ILLNESS
[FreeTextEntry1] : Ms. Roberson is a 37 y/o F with PMH of asthma, gestational DM (diet controlled), gestational HTN (not requiring medication, outpatient BP generally 130/80s), and mild COVID infection in 2022, who had an elective  on 10/4/22 which was uncomplicated, with newly diagnosed HFrEF (LVEF 15-20%, LVIDd 5.6 cm), which is likely a peripartum cardiomyopathy, who presents today for follow-up of her HF\par \par Since discharge on 10/16/22 she has maintained follow up in clinic and due to low SBP readings has subsequently had Entresto dose reduced. . \par \par Since last seen she has been having hypotensive SBP readings at home and as a results has not taken any AM doses of Entresto in past 7 days. Yesterday she had SBP reading >90 and therefore was able to take her AM dose of entresto. She denies any changes in her functional status and denies SOB at rest.  States she does not have best appetite recently but is without overt  signs of early satiety. No Orthopnea, no PND, no LE edema, no CP/Palpitations.

## 2022-12-19 NOTE — ASSESSMENT
[FreeTextEntry1] : 37 y/o F with PMH of asthma, gestational DM (diet controlled), gestational HTN (not requiring medication, outpatient BP generally 130/80s), and mild COVID infection in 2022, who had an elective  on 10/4/22 which was uncomplicated, with newly diagnosed HFrEF (LVEF 15-20%, LVIDd 5.6 cm), which is likely a peripartum cardiomyopathy, who presents today for follow-up of her cardiomyopathy. She is currently ACC/AHA Stage C, NYHA class II and is low-euvolemic in clinic, however has had persistently marginal home BP readings. Reassuringly, she is warm on exam and recent labs demonstrate stable renal markers. I have recommended the following:\par \par # HFrEF\par - Patient states that she has decided not to breastfeed anymore, thus can optimize HF medical therapy; Continue Entresto 12-13mg BID; Hold for SBP <90\par - Reduce Toprol XL to 25 mg daily (taken mid-day) to allow for BP room\par - She is aware to call if she has any LH/dizziness or her SBP is persistently  <90\par - Liberalize fluid intake may drink up to 3-4L daily\par - Recent labs from  with nml renal chemistries Na 143, K+ 4.1, BUN/Cr 11/0.6\par - Eventual SGLT2i and MRA at subsequent visits\par - Encouraged to eat small frequent meal to ensure adequate nutritional intake\par - Once she is on maximally tolerated GDMT, will repeat TTE and if her LVEF remains <35% will refer to EP for primary prevention ICD\par \par # Peripartum cardiomyopathy\par - GDMT as above\par - discussed importance of birth control as another pregnancy prior to recovery of her cardiomyopathy would not be recommended\par \par \par RTC with Dr. Mederos in 6 weeks

## 2022-12-19 NOTE — CARDIOLOGY SUMMARY
[de-identified] : \par 10/12/22 TTE: LVEF 15-20%, LVIDd 5.6cm, grade III-IV DD, nl RV size and function, mild MR, mild TR.\par

## 2023-01-12 ENCOUNTER — APPOINTMENT (OUTPATIENT)
Dept: HEART FAILURE | Facility: CLINIC | Age: 38
End: 2023-01-12

## 2023-01-31 ENCOUNTER — APPOINTMENT (OUTPATIENT)
Dept: HEART FAILURE | Facility: CLINIC | Age: 38
End: 2023-01-31
Payer: MEDICAID

## 2023-01-31 VITALS
SYSTOLIC BLOOD PRESSURE: 104 MMHG | HEIGHT: 61 IN | TEMPERATURE: 98.2 F | BODY MASS INDEX: 21.71 KG/M2 | WEIGHT: 115 LBS | DIASTOLIC BLOOD PRESSURE: 67 MMHG | OXYGEN SATURATION: 99 % | HEART RATE: 83 BPM

## 2023-01-31 PROCEDURE — 99214 OFFICE O/P EST MOD 30 MIN: CPT

## 2023-01-31 NOTE — PHYSICAL EXAM
[Well Nourished] : well nourished [No Xanthelasma] : no xanthelasma [Normal Venous Pressure] : normal venous pressure [Normal S1, S2] : normal S1, S2 [No Murmur] : no murmur [No Rub] : no rub [No Gallop] : no gallop [Clear Lung Fields] : clear lung fields [Soft] : abdomen soft [Normal] : alert and oriented, normal memory [Well Developed] : well developed [No Acute Distress] : no acute distress [No Respiratory Distress] : no respiratory distress  [Non Tender] : non-tender [Normal Bowel Sounds] : normal bowel sounds [Normal Gait] : normal gait [Normal Radial B/L] : normal radial B/L [Appears Anxious] : appears anxious [de-identified] : wearing face mask [de-identified] : warm peripherally

## 2023-01-31 NOTE — CARDIOLOGY SUMMARY
[de-identified] : \par 10/12/22 TTE: LVEF 15-20%, LVIDd 5.6cm, grade III-IV DD, nl RV size and function, mild MR, mild TR.\par

## 2023-01-31 NOTE — ASSESSMENT
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 She is currently ACC/AHA Stage C, NYHA class II and is low- euvolemic and low normotensive here in clinic, however has been frequently hypotensive at home. Reassuringly, she is warm on exam and last labs demonstrate stable renal markers suggestive of adequate cardiac output. I have recommended the following:\par \par # HFrEF\par - Patient states that she has decided not to breastfeed anymore, thus can optimize HF medical therapy; Continue Entresto 12-13mg BID; Hold for SBP <90, taking both doses, however with hypotension in the morning prior to AM dose limiting further uptitration\par - Continue Toprol XL to 25 mg daily (taken mid-day). She reports feeling dizzy with increased dose of Toprol XL, thus will continue same for now\par - She is aware to call if she has any LH/dizziness or her SBP is persistently  <90\par - Liberalize fluid intake may drink up to 3-4L daily\par - Recent labs from  with nml renal chemistries Na 143, K+ 4.1, BUN/Cr 11/0.6. Will repeat labs today\par - Eventual SGLT2i and MRA at subsequent visits; will defer for now given low normal filling pressures and marginal BP\par - Encouraged to eat small frequent meal to ensure adequate nutritional intake. She's down to her prepregnancy weight. Asked she notify us of ongoing weight loss or acute weight gain\par - Once she is on maximally tolerated GDMT, will repeat TTE and if her LVEF remains <35% will refer to EP for primary prevention ICD. Will discuss timing of repeat TTE with Dr. Mederos as she appears to be close to max tolerated meds given symptomatic hypotension previously and ongoing marginal BPs\par \par # Peripartum cardiomyopathy\par - GDMT as above\par - discussed importance of birth control as another pregnancy prior to recovery of her cardiomyopathy would not be recommended\par \par # Anxiety\par - Recommended follow up with her PCP. Offered behavioral health referral, which she declined at this time, but will consider\par \par Follow up with HF NP in 4 weeks

## 2023-01-31 NOTE — REASON FOR VISIT
[Cardiac Failure] : cardiac failure [Other: ____] : [unfilled] [FreeTextEntry1] : PATIENT INSTRUCTIONS: \par \par 1. Continue Metoprolol 25mg once daily \par \par 2. Continue Entresto ½ 24-26 mg twice daily; Hold for SBP readings <90 \par \par 3. You may drink up to 3-4L fluid daily \par \par 4. You can try eating small frequent meals for adequate nutrition \par \par 5. Continue daily BP and weight monitoring \par \par 6. Follow up with NP in 4 weeks

## 2023-01-31 NOTE — HISTORY OF PRESENT ILLNESS
[FreeTextEntry1] : Ms. 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 \par \par Since last seen 22, she reports overall doing fairly well. She feels fatigued often although is not getting consistent sleep as she's caring for her infant. She reports hypotension in the morning around 10 when she first takes her BP prior to eating breakfast with SBP ranging 77- 85 most mornings. She does not feel lightheaded or dizzy at this time, but does feel weak at times. About 1-2 hours after eating breakfast, closer to noon, she'll retake her BP and it's generally over 90, ranging  at which time she'll take 1/2 24-16mg tablet of Entresto. She's been taking Toprol XL 25mg around 5pm and her second dose of Entresto around 10-11pm. She does not check her BP after taking her medications but denies lightheadedness/dizziness. She reports feeling better and less dizzy with reduction in Toprol to 25mg daily. Her weight is down to her prepregnancy weight. She is walking for 10 minutes regularly and is able to climb a flight of stairs without dyspnea. She does report lightheadedness at times after walking. She additionally reports feeling anxious often since her diagnosis, although tells me she's had anxiety since she was younger. She denies feelings of depression. Her appetite has been fair, she's trying to eat small frequent meals but sometimes isn't eating much as she's tired or busy with the baby. Her and her  have been abstaining at this time but are not otherwise using birth control. She denies orthopnea, PND, CP, palpitations, abdominal distention and LE edema.

## 2023-02-01 LAB
ALBUMIN SERPL ELPH-MCNC: 4.6 G/DL
ALP BLD-CCNC: 61 U/L
ALT SERPL-CCNC: 40 U/L
ANION GAP SERPL CALC-SCNC: 11 MMOL/L
AST SERPL-CCNC: 29 U/L
BILIRUB SERPL-MCNC: 0.5 MG/DL
BUN SERPL-MCNC: 15 MG/DL
CALCIUM SERPL-MCNC: 10.1 MG/DL
CHLORIDE SERPL-SCNC: 105 MMOL/L
CO2 SERPL-SCNC: 24 MMOL/L
CREAT SERPL-MCNC: 0.73 MG/DL
EGFR: 109 ML/MIN/1.73M2
GLUCOSE SERPL-MCNC: 91 MG/DL
NT-PROBNP SERPL-MCNC: 304 PG/ML
POTASSIUM SERPL-SCNC: 4.9 MMOL/L
PROT SERPL-MCNC: 7.2 G/DL
SODIUM SERPL-SCNC: 140 MMOL/L

## 2023-02-25 ENCOUNTER — APPOINTMENT (OUTPATIENT)
Dept: CARDIOLOGY | Facility: CLINIC | Age: 38
End: 2023-02-25
Payer: MEDICAID

## 2023-02-25 PROCEDURE — 93306 TTE W/DOPPLER COMPLETE: CPT

## 2023-02-28 ENCOUNTER — APPOINTMENT (OUTPATIENT)
Dept: HEART FAILURE | Facility: CLINIC | Age: 38
End: 2023-02-28
Payer: MEDICAID

## 2023-02-28 PROCEDURE — 99443: CPT

## 2023-03-16 NOTE — CONSULT NOTE ADULT - CONSULT REASON
A catheter was exchanged for a (CATHETER 6FR PGTL CRV 110CM 2 WIRE BRAID STIFF PROX SHAFT) catheter. HF management

## 2023-04-04 ENCOUNTER — RX RENEWAL (OUTPATIENT)
Age: 38
End: 2023-04-04

## 2023-06-12 ENCOUNTER — APPOINTMENT (OUTPATIENT)
Dept: HEART FAILURE | Facility: CLINIC | Age: 38
End: 2023-06-12
Payer: MEDICAID

## 2023-06-12 VITALS
HEART RATE: 93 BPM | SYSTOLIC BLOOD PRESSURE: 117 MMHG | WEIGHT: 115 LBS | TEMPERATURE: 98.6 F | BODY MASS INDEX: 21.71 KG/M2 | HEIGHT: 61 IN | OXYGEN SATURATION: 98 % | DIASTOLIC BLOOD PRESSURE: 79 MMHG

## 2023-06-12 PROCEDURE — 99214 OFFICE O/P EST MOD 30 MIN: CPT

## 2023-06-12 RX ORDER — METOPROLOL SUCCINATE 25 MG/1
25 TABLET, EXTENDED RELEASE ORAL
Qty: 30 | Refills: 5 | Status: DISCONTINUED | COMMUNITY
Start: 2022-10-25 | End: 2023-06-12

## 2023-06-19 LAB
ALBUMIN SERPL ELPH-MCNC: 4.8 G/DL
ALP BLD-CCNC: 63 U/L
ALT SERPL-CCNC: 20 U/L
ANION GAP SERPL CALC-SCNC: 13 MMOL/L
AST SERPL-CCNC: 18 U/L
BILIRUB SERPL-MCNC: 0.5 MG/DL
BUN SERPL-MCNC: 12 MG/DL
CALCIUM SERPL-MCNC: 9.5 MG/DL
CHLORIDE SERPL-SCNC: 107 MMOL/L
CO2 SERPL-SCNC: 23 MMOL/L
CREAT SERPL-MCNC: 0.76 MG/DL
EGFR: 104 ML/MIN/1.73M2
GLUCOSE SERPL-MCNC: 92 MG/DL
NT-PROBNP SERPL-MCNC: 87 PG/ML
POTASSIUM SERPL-SCNC: 4.5 MMOL/L
PROT SERPL-MCNC: 7.5 G/DL
SODIUM SERPL-SCNC: 143 MMOL/L

## 2023-06-27 NOTE — CARDIOLOGY SUMMARY
[de-identified] : \par TTE 2/23/23 TTE: LVIDd 5.1, LVEF 40-45%, mod decreased LVSF, hypokinesis of the anterior wall and apex, w/ Akinesis of the interventricular septum, TAPSE 1.9, mild MR, trace AR, min TR, \par \par 10/12/22 TTE: LVEF 15-20%, LVIDd 5.6cm, grade III-IV DD, nl RV size and function, mild MR, mild TR.\par

## 2023-06-27 NOTE — PHYSICAL EXAM
[de-identified] : wearing face mask [de-identified] : JVP <6 cm of H20 [de-identified] : warm peripherally

## 2023-06-27 NOTE — HISTORY OF PRESENT ILLNESS
[FreeTextEntry1] : Ms. 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 Her recent TTE in 2023 demonstrated an improved LVEF to 40-45% with persistent LVD. \par \par Since last visit she reports ongoing dizziness with infrequent near syncopal episodes, but no LOC. At these times her SBP readings are ~82 and HR is reportedly elevated, her typical home SBP readings though have ranged 's. She no longer takes Toprol d/t worsening dizziness with each dose and misses occasional evening doses of Entresto. She tries to maintain adequate hydration but struggles to meet target daily fluid intake of at least 1.5 L/day. She reports ongoing mood issues postpartum and wonders if this is related to her heart. Otherwise, she denies SOB, no fatigue, no LE edema, no orthopnea, no PND, no CP/palpitations and no abdominal distention.

## 2023-06-27 NOTE — ASSESSMENT
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 She is currently ACC/AHA Stage C, NYHA class II and is low- euvolemic and normotensive here in clinic, however has been frequently hypotensive at home likely in setting of mild dehydration. Reassuringly, she is warm on exam. I have recommended the following:\par \par # HFrEF\par - recent TTE in 2023 reveals and improved LVEF of 40-45% from 15-20%\par - Patient states that she has decided not to breastfeed anymore. Continue Entresto 12-13 mg BID for now; Hold for SBP <90. \par - Will defer resumption of BB for now given symptomatic hypotension\par - She is aware to call if she has any LH/dizziness or her SBP is persistently  <90\par - Instructed to liberalize fluid and dietary sodium intake may drink up to 3-4L daily; Ok to use Gatorade/Pedialyte\par - Will repeat labs today\par - Further optimization of GDMT dependent of results of repeat TTE in the upcoming weeks\par - recent TTE in February with LVEF >35%, so no indication for primary prevention device at this time. Will obtain repeat TTE in the upcoming weeks to further assess for recovery of LVF at which time will consider stopping ARNI. \par \par # Peripartum cardiomyopathy\par - GDMT as above\par - discussed importance of birth control as another pregnancy prior to recovery of her cardiomyopathy would not be recommended\par \par # Anxiety\par - Offered behavioral health referral, to Dr. Rosalia Park; which she would like some time to consider. Strongly encouraged utilization of this resource. She has been provided with name and contact information should she decide in the following days. Will otherwise reach out to her in the upcoming weeks to follow up on decision. \par \par \par Follow up with Dr. Mederos in 3 months.

## 2023-07-03 NOTE — PROGRESS NOTE ADULT - ASSESSMENT
PA NOTE:  pod#2   primary elective c/s 39.1; CAROLYNETRAYA   covid neg   bp: 130-140/70-80  asympt  doing well  case management for home care    in the room                        8.5    11.08 )-----------( 155      ( 05 Oct 2022 06:58 )             26.2   on iron supplement  - pt requesting circumcision for the baby dr kruger notified   -pending dc home today if cleared   
None

## 2023-07-07 ENCOUNTER — APPOINTMENT (OUTPATIENT)
Dept: CV DIAGNOSITCS | Facility: HOSPITAL | Age: 38
End: 2023-07-07

## 2023-07-08 ENCOUNTER — APPOINTMENT (OUTPATIENT)
Dept: CARDIOLOGY | Facility: CLINIC | Age: 38
End: 2023-07-08

## 2023-07-13 ENCOUNTER — OUTPATIENT (OUTPATIENT)
Dept: OUTPATIENT SERVICES | Facility: HOSPITAL | Age: 38
LOS: 1 days | End: 2023-07-13
Payer: MEDICAID

## 2023-07-13 ENCOUNTER — APPOINTMENT (OUTPATIENT)
Dept: CV DIAGNOSITCS | Facility: HOSPITAL | Age: 38
End: 2023-07-13

## 2023-07-13 DIAGNOSIS — I50.20 UNSPECIFIED SYSTOLIC (CONGESTIVE) HEART FAILURE: ICD-10-CM

## 2023-07-13 DIAGNOSIS — Z98.891 HISTORY OF UTERINE SCAR FROM PREVIOUS SURGERY: Chronic | ICD-10-CM

## 2023-07-13 PROCEDURE — 76376 3D RENDER W/INTRP POSTPROCES: CPT

## 2023-07-13 PROCEDURE — 93356 MYOCRD STRAIN IMG SPCKL TRCK: CPT

## 2023-07-13 PROCEDURE — 93306 TTE W/DOPPLER COMPLETE: CPT | Mod: 26

## 2023-07-13 PROCEDURE — 93306 TTE W/DOPPLER COMPLETE: CPT

## 2023-07-13 PROCEDURE — 76376 3D RENDER W/INTRP POSTPROCES: CPT | Mod: 26

## 2023-07-28 ENCOUNTER — APPOINTMENT (OUTPATIENT)
Dept: HEART FAILURE | Facility: CLINIC | Age: 38
End: 2023-07-28
Payer: MEDICAID

## 2023-07-28 PROCEDURE — 99213 OFFICE O/P EST LOW 20 MIN: CPT | Mod: 95

## 2023-08-30 RX ORDER — FAMOTIDINE 20 MG/1
20 TABLET, FILM COATED ORAL
Qty: 30 | Refills: 6 | Status: ACTIVE | COMMUNITY
Start: 2022-04-27 | End: 1900-01-01

## 2023-09-01 ENCOUNTER — RX RENEWAL (OUTPATIENT)
Age: 38
End: 2023-09-01

## 2023-09-01 ENCOUNTER — APPOINTMENT (OUTPATIENT)
Dept: HEART FAILURE | Facility: CLINIC | Age: 38
End: 2023-09-01
Payer: MEDICAID

## 2023-09-01 VITALS
WEIGHT: 117 LBS | HEART RATE: 78 BPM | HEIGHT: 61 IN | TEMPERATURE: 98.6 F | BODY MASS INDEX: 22.09 KG/M2 | SYSTOLIC BLOOD PRESSURE: 110 MMHG | OXYGEN SATURATION: 100 % | DIASTOLIC BLOOD PRESSURE: 71 MMHG

## 2023-09-01 PROCEDURE — 99213 OFFICE O/P EST LOW 20 MIN: CPT

## 2023-09-01 NOTE — CARDIOLOGY SUMMARY
[de-identified] : 7/13/23 TTE: LVIDd 4.7cm, LVEF 35%, no regional WMA, normal RVSF, TAPSE 1.8, normal BiAtria, trace MR, trace TR, no PASP/RVSP calculated.  TTE 2/23/23 TTE: LVIDd 5.1, LVEF 40-45%, mod decreased LVSF, hypokinesis of the anterior wall and apex, w/ Akinesis of the interventricular septum, TAPSE 1.9, mild MR, trace AR, min TR,   10/12/22 TTE: LVEF 15-20%, LVIDd 5.6cm, grade III-IV DD, nl RV size and function, mild MR, mild TR.

## 2023-09-01 NOTE — HISTORY OF PRESENT ILLNESS
[FreeTextEntry1] : Ms. Roberson is a 37-year-old woman with peripartum cardiomyopathy, diagnosed with HFrEF in October 2022 following birth of her child. LVEF initially improved to 40-45% with GDMT from prior 15-20% but more recently, declined to 35%. Other medical history includes gestational HTN, gestational DM, asthma and COVID infection 8/2022. She presents today for routine follow-up of her cardiomyopathy.   Recent TTE in July demonstrated decline of LVEF to 35% from prior improved 40-45% though there was evidence of positive remodeling and prior noted regional WMA no longer seen. GDMT further escalated, though remains at low dose due to prior limitations with symptomatic marginal BP.   Feeling generally well but with ongoing episodes of postural LH since escalation of Entresto. Has been trying to be cognizant of oral hydration. No issues with SOB. Episode of mild chest discomfort when she had been crying heavily when father fell ill. No recurrent. No orthopnea or PND. Appetite is good.

## 2023-09-01 NOTE — PHYSICAL EXAM
[Well Developed] : well developed [Well Nourished] : well nourished [No Acute Distress] : no acute distress [No Xanthelasma] : no xanthelasma [Normal Venous Pressure] : normal venous pressure [Normal S1, S2] : normal S1, S2 [No Murmur] : no murmur [No Rub] : no rub [No Gallop] : no gallop [Clear Lung Fields] : clear lung fields [No Respiratory Distress] : no respiratory distress  [Soft] : abdomen soft [Non Tender] : non-tender [Normal Bowel Sounds] : normal bowel sounds [Normal Radial B/L] : normal radial B/L [Normal] : alert and oriented, normal memory [Good Air Entry] : good air entry [de-identified] : JVP 6 cm H2O, no HJR [de-identified] : warm peripherally

## 2023-09-01 NOTE — ASSESSMENT
[FreeTextEntry1] : 37-year-old woman with peripartum cardiomyopathy, diagnosed with HFrEF in October 2022 following birth of her child. LVEF initially improved to 40-45% with GDMT from prior 15-20% but more recently, declined to 35%. Other medical history includes gestational HTN, gestational DM, asthma and COVID infection 8/2022. She is ACC/AHA Stage C, NYHA class II, euvolemic and normotensive. Optimization of GDMT has been challanging due to persistent LH.   # HFrEF - Etiology: Peripartum. TTE with reduced LVEF but with positive remodeling and prior noted regional WMA no longer seen - GDMT: current regimen is Entresto 12-14 mg AM / 24-26 mg PM and Spironolactone 12.5 mg QD. Further optimization limited by persistent LH - Diuretics: no loop diuretic requirement, instructed to hydrate - Device: If LVEF remains 35% or less, will consider primary prevention device - Discussed CMR to further evaluate cardiac function but she is hesitant to proceed. Will instead obtain TTE in October.  - Labs: from 6/12 with K 4.5, Cr 07, normal LFTs and normalized pro-BNP 87  # Peripartum cardiomyopathy - GDMT as above - discussed importance of birth control as another pregnancy prior to recovery of her cardiomyopathy would not be recommended. She is currently not seeking pregnancy at this time though is open to re-considering in later years based on cardiac function.   # Anxiety - Previously offered behavioral health referral, to Dr. Rosalia Park; which she would like some time to consider. Strongly encouraged utilization of this resource. She has been provided with name and contact information.   Follow up with Dr. Mederos in 3 months after TTE

## 2023-09-18 NOTE — H&P ADULT - PATIENT'S GENDER IDENTITY
CONSULTATION NOTE - GENERAL NEUROLOGY    CHIEF COMPLAINT    Accidental fall    HISTORY OF PRESENT ILLNESS    76 year old year old,male, seen in neurologic consultation on 9/18/2023.  Patient [facility] to [facility].  Chart reviewed, patient seen in the room with the daughter, according to report and according to family patient slowly has been deteriorating with respect to memory and with ataxia with tendency to fall, also with bladder incontinence, 3 years ago his MRI showed possible NPH, see report in the chart, patient has a spinal tap in the past with some transient improvement in his symptoms, but according to daughter, because of corona/COVID his further treatment was not considered and discussed.  Patient admitted because of an atrial fall with description as per primary attending as follows  Abraham is a 76 year old male presenting with PMHx including dementia, DM2, a-fib w/ watchman closure, not on AC due to fall hx, CAD, HTN, HLD, PVD, CKD, BPH, irritable bowel syndrome, recent ERCP, hx falls, anemia, recent admission, presenting to the hospital as a transfer from [facility] ER for concern of accidental fall, and continued care     Symptoms started today.  His caregiver said that she had just helped him eat breakfast, and he ate ok.  Then pt had got up without assistance and tried to get to the couch.  He had an accidental fall.  Was just feeling generally weak.  She couldn't get him up off the floor.  The home health RN and physical therapist arrived, and saw he was in the floor, they called EMS and thought pt needed more assistance than home care.  Pt denies any injuries.  Ate ok.  No HA/ dizziness.  No abd pains/ n/ v/ d.  No CP/ SOB.  The caregiver said they recently found pt had esophagitis, but he is doing much better, not having pain/problems eating.  No other symptoms or concerns reported at this time.  An 11-system review was negative other than as per history of present  illness    Patient was on Aricept report which is on hold for now because of recent GI complaint with nausea vomiting and thrush according to her this is slowly improving the patient appetite is improving and his generalized weakness is improving as well  Patient denies any headache, dizziness, neck pain, visual complaints etc.  Patient with poor judgment and insight and could not give detailed history and has significant short-term memory deficit    REVIEW OF SYSTEMS :     A comprehensive review of systems was performed, all negative except as per HPI.     HISTORIES:    Past Medical History:   Diagnosis Date   • Ambulates with cane    • Atrial fibrillation (CMD)    • BPH (benign prostatic hyperplasia)    • Chronic kidney disease    • Colon polyps    • Coronary artery disease    • Dementia (CMD)    • Diabetes mellitus (CMD)    • Dry eye syndrome    • Epiretinal membrane    • Erectile dysfunction    • Essential (primary) hypertension    • Gastroesophageal reflux disease    • High cholesterol    • IBS (irritable bowel syndrome)    • Inflammatory bowel disease    • Lumbar radiculopathy    • Malignant neoplasm (CMD)     basal cell skin cancer- face   • NSTEMI (non-ST elevated myocardial infarction) (CMD)    • OAB (overactive bladder)    • PVD (peripheral vascular disease) (CMD)    • PVD (posterior vitreous detachment)    • Skin cancer     BCC-face   • Wears dentures     full   • Wears glasses        Past Surgical History:   Procedure Laterality Date   • Atherectomy Left 2020    peripheral atherectomy of the left leg   • Bypass graft Right 2017    right leg arterial bypass   • Cataract extraction w/  intraocular lens implant Bilateral 2021    right-7/8/21, left-6/24/21   • Colonoscopy  04/18/2017    Pietrzak polyp NOTES   • Left atrial appendage closure  05/2023   • Skin biopsy         Family History   Problem Relation Age of Onset   • Stroke Mother    • Cancer Father    • Hypertension Other    • Cancer, Lung Other    •  Stroke Other        Social History     Tobacco Use   • Smoking status: Every Day     Current packs/day: 0.50     Types: Cigarettes     Passive exposure: Current   • Smokeless tobacco: Never   Vaping Use   • Vaping Use: never used   Substance Use Topics   • Alcohol use: No   • Drug use: No       INPATIENT MEDICATIONS:    Current Facility-Administered Medications   Medication Dose Route Frequency Provider Last Rate Last Admin   • aspirin chewable 81 mg  81 mg Oral Daily Elayne Angelo DO   81 mg at 09/18/23 0803   • clopidogrel (PLAVIX) tablet 75 mg  75 mg Oral Daily Elayne Angelo DO   75 mg at 09/18/23 0803   • ezetimibe (ZETIA) tablet 10 mg  10 mg Oral Q Evening Elayne Angelo DO       • metoPROLOL succinate (TOPROL-XL) ER tablet 25 mg  25 mg Oral Daily Elayne Angelo DO   25 mg at 09/18/23 0803   • pantoprazole (PROTONIX) EC tablet 40 mg  40 mg Oral Daily Elayne Angelo DO   40 mg at 09/18/23 0803   • QUEtiapine (SEROquel) tablet 12.5 mg  12.5 mg Oral Nightly Elayne Angelo DO       • buPROPion (WELLBUTRIN) tablet 150 mg  150 mg Oral Daily Ministerio Dodson DO   150 mg at 09/18/23 0803   • lisinopril (ZESTRIL) tablet 20 mg  20 mg Oral Q Evening Ministerio Dodson DO       • sodium chloride (PF) 0.9 % injection 2 mL  2 mL Intracatheter 2 times per day Elayne Angelo DO   2 mL at 09/18/23 0806   • heparin (porcine) injection 5,000 Units  5,000 Units Subcutaneous 3 times per day Elayne Angelo DO   5,000 Units at 09/18/23 0638   • Potassium Standard Replacement Protocol (Levels 3.5 and lower)   Does not apply See Admin Instructions Elayne Angelo DO       • Magnesium Standard Replacement Protocol   Does not apply See Admin Instructions Elayne Angelo DO       • Phosphorus Standard Replacement Protocol   Does not apply See Admin Instructions Elayne Angelo DO       • insulin lispro (ADMELOG,HumaLOG) - Correction Dose   Subcutaneous TID WC Gaw, Elayne L, DO   1 Units at 09/18/23 1234   • insulin lispro (ADMELOG,HumaLOG) - Correction  Dose   Subcutaneous Nightly Elayne Angelo, DO          Current Facility-Administered Medications   Medication Dose Route Frequency Provider Last Rate Last Admin   • sodium chloride 0.9 % flush bag 25 mL  25 mL Intravenous PRN Elayne Angelo DO       • ondansetron (ZOFRAN) injection 4 mg  4 mg Intravenous Q6H PRN Elayne Angelo, DO       • acetaminophen (TYLENOL) tablet 650 mg  650 mg Oral Q4H PRN Elayne Angelo, DO       • polyethylene glycol (MIRALAX) packet 17 g  17 g Oral Daily PRN Elayne Angelo, DO       • docusate sodium-sennosides (SENOKOT S) 50-8.6 MG 2 tablet  2 tablet Oral Daily PRN Elayne Angelo, DO       • aluminum-magnesium hydroxide-simethicone (MAALOX) 200-200-20 MG/5ML suspension 30 mL  30 mL Oral Q4H PRN Elayne Angelo, DO       • sodium chloride 0.9 % flush bag 25 mL  25 mL Intravenous PRN Elayne Angelo, DO       • melatonin tablet 3 mg  3 mg Oral Nightly PRN Elayne Angelo, DO       • dextrose 50 % injection 25 g  25 g Intravenous PRN Elayne Angelo, DO       • dextrose 50 % injection 12.5 g  12.5 g Intravenous PRN Elayne Angelo, DO       • glucagon (GLUCAGEN) injection 1 mg  1 mg Intramuscular PRN Elayne Angelo, DO       • dextrose (GLUTOSE) 40 % gel 15 g  15 g Oral PRN Elayne Angelo, DO       • dextrose (GLUTOSE) 40 % gel 30 g  30 g Oral PRN Elayne Angelo, DO            HOME MEDICATIONS:    Prior to Admission medications    Medication Sig Start Date End Date Taking? Authorizing Provider   pantoprazole (PROTONIX) 40 MG tablet Take 1 tablet by mouth daily. 9/15/23 10/15/23 Yes Vilma Barron MD   metoPROLOL succinate (TOPROL-XL) 50 MG 24 hr tablet Take 0.5 tablets by mouth daily. Indications: High Blood Pressure Disorder 9/14/23  Yes Vilma Barron MD   buPROPion (WELLBUTRIN) 100 MG tablet Take 1.5 tablets by mouth daily. 9/14/23  Yes Vilma Barron MD   QUEtiapine (SEROquel) 25 MG tablet Take 0.5 tablets by mouth nightly. 9/14/23  Yes Vilma Barron MD   clopidogrel (PLAVIX) 75 MG tablet Take 75 mg  by mouth daily. 7/14/23  Yes Provider, Outside   vitamin D, Cholecalciferol, 25 mcg (1,000 units) capsule Take 25 mcg by mouth daily.   Yes Provider, Outside   aspirin 81 MG chewable tablet Chew 1 tablet by mouth daily. Indications: Stroke Due To Limited Blood Flow 3/16/23  Yes Kaylee Celaya CNP   ezetimibe (ZETIA) 10 MG tablet Take 1 tablet by mouth daily. Indications: Ischemic Heart Disease  Patient taking differently: Take 10 mg by mouth every evening. Indications: Ischemic Heart Disease 3/16/23 9/17/23 Yes Kaylee Celaya CNP   lisinopril (ZESTRIL) 20 MG tablet Take 1 tablet by mouth daily. Indications: High Blood Pressure Disorder  Patient taking differently: Take 20 mg by mouth every evening. Indications: High Blood Pressure Disorder 3/16/23 9/17/23 Yes Kaylee Celaya CNP   ondansetron (ZOFRAN) 4 MG tablet Take 1 tablet by mouth every 8 hours as needed for Nausea. 8/28/23   Yola Woody CNP   melatonin 3 MG Take 1 tablet by mouth at bedtime. Indications: Trouble Sleeping 3/16/23   Kaylee Celaya CNP   vitamin E 100 units capsule Take 1 capsule by mouth daily. Indications: Deficiency of Vitamin E 3/16/23   Kaylee Celaya CNP   polyethylene glycol (MIRALAX) 17 g packet Take 17 g by mouth daily. Indications: Constipation Stir and dissolve powder in any 4 to 8 ounces of beverage, then drink. 3/16/23   Kaylee Celaya CNP   Blood Glucose Monitoring Suppl (ACCU-CHEK ROMELIA) Device Test blood sugar two times daily 7/8/20   Ephraim Butterfield MD   blood glucose (ACCU-CHEK ROMELIA) test strip Test blood sugar two times daily as directed. 7/8/20   Ephraim Butterfield MD   SOFTCLIX LANCETS Misc Test blood sugar two times daily 7/8/20   Ephraim Butterfield MD        ALLERGIES:    ALLERGIES:  No Known Allergies     PHYSICAL EXAM    Visit Vitals  BP (!) 147/78 (BP Location: RUE - Right upper extremity, Patient Position: Supine)   Pulse (!) 60   Temp 98.1 °F (36.7 °C) (Oral)   Resp (!) 20   Ht 5' 4\" (1.626 m)   Wt 62.5  kg (137 lb 12.6 oz)   SpO2 100%   BMI 23.65 kg/m²       General: No acute distress, seen in the room with the daughter  Cognition and Speech: Normal speech, knew he is in the hospital but could not tell me his age, president, month, year, name of the hospital, his short-term memory is significantly affected and recall is essentially 0 out of 3 in few minutes tends to confabulate  Eyes:  Pupils are equal, round and reactive to light, Extraocular movements are intact. Visual fields are full grossly  HEENT:  Normocephalic, Normal hearing both ears, throat clear  Neck:  Supple, Non-tender,   Respiratory:  Lungs are clear  Speech is clear.    Judgment and comprehension grossly but follows commands and move extremities with good strength  Cranial nerve exam: Grossly CN are intact 2-12. facial strength and sensation intact, uvula midline, palatal evaluation normal, tongue movements were normal.  Normal shoulder shrug strength.  Motor: No drift seen both upper extremities.    Strength: Grossly 5/5 all 4 extremities  Normal muscle tone in bilateral upper and lower extremities  Sensations: Intact to all modalities grossly  Reflexes: Symmetric bilaterally both upper and Lower extremities.    Cerebellar: Dysmetric bilaterally  Gait and Balance: Deferred    LABORATORY    I have reviewed the pertinent laboratory tests:    Recent Results (from the past 24 hour(s))   GLUCOSE, BEDSIDE - POINT OF CARE    Collection Time: 09/17/23  8:59 PM   Result Value Ref Range    GLUCOSE, BEDSIDE - POINT OF CARE 162 (H) 70 - 99 mg/dL   Magnesium    Collection Time: 09/18/23  7:11 AM   Result Value Ref Range    Magnesium 2.0 1.7 - 2.4 mg/dL   Comprehensive Metabolic Panel    Collection Time: 09/18/23  7:11 AM   Result Value Ref Range    Fasting Status      Sodium 144 135 - 145 mmol/L    Potassium 4.0 3.4 - 5.1 mmol/L    Chloride 107 97 - 110 mmol/L    Carbon Dioxide 28 21 - 32 mmol/L    Anion Gap 13 7 - 19 mmol/L    Glucose 158 (H) 70 - 99 mg/dL     BUN 15 6 - 20 mg/dL    Creatinine 1.30 (H) 0.67 - 1.17 mg/dL    Glomerular Filtration Rate 57 (L) >=60    BUN/Cr 12 7 - 25    Calcium 9.0 8.4 - 10.2 mg/dL    Bilirubin, Total 0.6 0.2 - 1.0 mg/dL    GOT/AST 32 <=37 Units/L    GPT/ALT 69 (H) <64 Units/L    Alkaline Phosphatase 140 (H) 45 - 117 Units/L    Albumin 2.7 (L) 3.6 - 5.1 g/dL    Protein, Total 6.4 6.4 - 8.2 g/dL    Globulin 3.7 2.0 - 4.0 g/dL    A/G Ratio 0.7 (L) 1.0 - 2.4   CBC with Automated Differential (performable only)    Collection Time: 09/18/23  7:11 AM   Result Value Ref Range    WBC 5.0 4.2 - 11.0 K/mcL    RBC 3.46 (L) 4.50 - 5.90 mil/mcL    HGB 10.7 (L) 13.0 - 17.0 g/dL    HCT 32.2 (L) 39.0 - 51.0 %    MCV 93.1 78.0 - 100.0 fl    MCH 30.9 26.0 - 34.0 pg    MCHC 33.2 32.0 - 36.5 g/dL    RDW-CV 13.1 11.0 - 15.0 %    RDW-SD 45.0 39.0 - 50.0 fL     140 - 450 K/mcL    NRBC 0 <=0 /100 WBC    Neutrophil, Percent 52 %    Lymphocytes, Percent 29 %    Mono, Percent 13 %    Eosinophils, Percent 5 %    Basophils, Percent 1 %    Immature Granulocytes 0 %    Absolute Neutrophils 2.7 1.8 - 7.7 K/mcL    Absolute Lymphocytes 1.4 1.0 - 4.0 K/mcL    Absolute Monocytes 0.6 0.3 - 0.9 K/mcL    Absolute Eosinophils  0.2 0.0 - 0.5 K/mcL    Absolute Basophils 0.0 0.0 - 0.3 K/mcL    Absolute Immature Granulocytes 0.0 0.0 - 0.2 K/mcL   GLUCOSE, BEDSIDE - POINT OF CARE    Collection Time: 09/18/23  7:45 AM   Result Value Ref Range    GLUCOSE, BEDSIDE - POINT OF CARE 139 (H) 70 - 99 mg/dL   GLUCOSE, BEDSIDE - POINT OF CARE    Collection Time: 09/18/23 11:27 AM   Result Value Ref Range    GLUCOSE, BEDSIDE - POINT OF CARE 157 (H) 70 - 99 mg/dL       IMAGING    I have reviewed the pertinent imaging/study reports.      No orders to display      No results found for this or any previous visit.       IMPRESSION :    76 gentleman with a history for possible NPH, atrial fibrillation, not on anticoagulation due to risk of fall, admitted with accidental fall but no major trauma  Female or injuries reported, patient does have triad of urinary incontinence, dementia and ataxia which is slowly progressive for the last few years according to family, last MRI 3 years ago with likely NPH, see report in the chart    RECOMMENDATIONS :    1. Patient should follow-up outpatient wise with neurosurgery as soon as possible after discharge to discuss treatment options regarding very likely NPH, will defer to outpatient neurosurgery to decide about further brain imaging if necessary  2. Continue fall precautions  3. Okay to continue Plavix and aspirin  4. Otherwise, in my opinion, no need for Aricept at the moment  5. Patient medically stable, can be discharged home with above recommendation but is follow-up outpatient with neurosurgery ASAP, discussed at length this with the daughter and with the primary attending  6. Check TSH, B12, RPR as dementia work-up labs    We will follow peripherally as needed for now while in the hospital, please call for questions    Thank you    Adi Payne MD   (available via [employer])  Levine, Susan. \[facility] Outlook.\"" Neurology Attending

## 2023-11-03 ENCOUNTER — APPOINTMENT (OUTPATIENT)
Dept: CV DIAGNOSITCS | Facility: HOSPITAL | Age: 38
End: 2023-11-03

## 2023-11-03 ENCOUNTER — OUTPATIENT (OUTPATIENT)
Dept: OUTPATIENT SERVICES | Facility: HOSPITAL | Age: 38
LOS: 1 days | End: 2023-11-03
Payer: MEDICAID

## 2023-11-03 DIAGNOSIS — I50.20 UNSPECIFIED SYSTOLIC (CONGESTIVE) HEART FAILURE: ICD-10-CM

## 2023-11-03 DIAGNOSIS — Z98.891 HISTORY OF UTERINE SCAR FROM PREVIOUS SURGERY: Chronic | ICD-10-CM

## 2023-11-03 PROCEDURE — 76376 3D RENDER W/INTRP POSTPROCES: CPT | Mod: 26

## 2023-11-03 PROCEDURE — 93356 MYOCRD STRAIN IMG SPCKL TRCK: CPT

## 2023-11-03 PROCEDURE — 93306 TTE W/DOPPLER COMPLETE: CPT

## 2023-11-03 PROCEDURE — 76376 3D RENDER W/INTRP POSTPROCES: CPT

## 2023-11-03 PROCEDURE — 93306 TTE W/DOPPLER COMPLETE: CPT | Mod: 26

## 2023-11-17 ENCOUNTER — APPOINTMENT (OUTPATIENT)
Dept: HEART FAILURE | Facility: CLINIC | Age: 38
End: 2023-11-17
Payer: MEDICAID

## 2023-11-17 DIAGNOSIS — Z86.59 PERSONAL HISTORY OF OTHER MENTAL AND BEHAVIORAL DISORDERS: ICD-10-CM

## 2023-11-17 PROCEDURE — 99214 OFFICE O/P EST MOD 30 MIN: CPT

## 2023-11-21 ENCOUNTER — NON-APPOINTMENT (OUTPATIENT)
Age: 38
End: 2023-11-21

## 2023-11-21 LAB
ANION GAP SERPL CALC-SCNC: 12 MMOL/L
BUN SERPL-MCNC: 12 MG/DL
CALCIUM SERPL-MCNC: 9.1 MG/DL
CHLORIDE SERPL-SCNC: 105 MMOL/L
CO2 SERPL-SCNC: 23 MMOL/L
CREAT SERPL-MCNC: 0.58 MG/DL
EGFR: 120 ML/MIN/1.73M2
GLUCOSE SERPL-MCNC: 78 MG/DL
NT-PROBNP SERPL-MCNC: <36 PG/ML
POTASSIUM SERPL-SCNC: 4.4 MMOL/L
SODIUM SERPL-SCNC: 139 MMOL/L

## 2023-11-26 PROBLEM — Z86.59 HISTORY OF ANXIETY DISORDER: Status: ACTIVE | Noted: 2022-03-23

## 2023-12-06 ENCOUNTER — NON-APPOINTMENT (OUTPATIENT)
Age: 38
End: 2023-12-06

## 2023-12-06 RX ORDER — DAPAGLIFLOZIN 10 MG/1
10 TABLET, FILM COATED ORAL
Qty: 15 | Refills: 1 | Status: DISCONTINUED | COMMUNITY
Start: 2023-11-17 | End: 2023-12-06

## 2024-04-19 ENCOUNTER — APPOINTMENT (OUTPATIENT)
Dept: HEART FAILURE | Facility: CLINIC | Age: 39
End: 2024-04-19
Payer: MEDICAID

## 2024-04-19 VITALS
HEIGHT: 61 IN | SYSTOLIC BLOOD PRESSURE: 111 MMHG | BODY MASS INDEX: 22.09 KG/M2 | HEART RATE: 79 BPM | WEIGHT: 117 LBS | DIASTOLIC BLOOD PRESSURE: 76 MMHG | OXYGEN SATURATION: 98 % | TEMPERATURE: 98.2 F

## 2024-04-19 PROCEDURE — G2211 COMPLEX E/M VISIT ADD ON: CPT | Mod: NC,1L

## 2024-04-19 PROCEDURE — 99214 OFFICE O/P EST MOD 30 MIN: CPT

## 2024-04-19 RX ORDER — SPIRONOLACTONE 25 MG/1
25 TABLET ORAL DAILY
Qty: 30 | Refills: 5 | Status: ACTIVE | COMMUNITY
Start: 2023-02-28 | End: 1900-01-01

## 2024-04-22 LAB
ANION GAP SERPL CALC-SCNC: 11 MMOL/L
BUN SERPL-MCNC: 8 MG/DL
CALCIUM SERPL-MCNC: 8.9 MG/DL
CHLORIDE SERPL-SCNC: 107 MMOL/L
CO2 SERPL-SCNC: 21 MMOL/L
CREAT SERPL-MCNC: 0.6 MG/DL
EGFR: 118 ML/MIN/1.73M2
GLUCOSE SERPL-MCNC: 90 MG/DL
MAGNESIUM SERPL-MCNC: 2.3 MG/DL
NT-PROBNP SERPL-MCNC: 59 PG/ML
POTASSIUM SERPL-SCNC: 4.4 MMOL/L
SODIUM SERPL-SCNC: 139 MMOL/L

## 2024-04-25 ENCOUNTER — RESULT REVIEW (OUTPATIENT)
Age: 39
End: 2024-04-25

## 2024-04-25 ENCOUNTER — OUTPATIENT (OUTPATIENT)
Dept: OUTPATIENT SERVICES | Facility: HOSPITAL | Age: 39
LOS: 1 days | End: 2024-04-25
Payer: MEDICAID

## 2024-04-25 ENCOUNTER — APPOINTMENT (OUTPATIENT)
Dept: CV DIAGNOSITCS | Facility: HOSPITAL | Age: 39
End: 2024-04-25

## 2024-04-25 DIAGNOSIS — I25.10 ATHEROSCLEROTIC HEART DISEASE OF NATIVE CORONARY ARTERY WITHOUT ANGINA PECTORIS: ICD-10-CM

## 2024-04-25 DIAGNOSIS — Z98.891 HISTORY OF UTERINE SCAR FROM PREVIOUS SURGERY: Chronic | ICD-10-CM

## 2024-04-25 PROCEDURE — 76376 3D RENDER W/INTRP POSTPROCES: CPT

## 2024-04-25 PROCEDURE — 93306 TTE W/DOPPLER COMPLETE: CPT

## 2024-04-25 PROCEDURE — 93356 MYOCRD STRAIN IMG SPCKL TRCK: CPT

## 2024-04-25 PROCEDURE — 76376 3D RENDER W/INTRP POSTPROCES: CPT | Mod: 26

## 2024-04-25 PROCEDURE — 93306 TTE W/DOPPLER COMPLETE: CPT | Mod: 26

## 2024-06-17 ENCOUNTER — RX RENEWAL (OUTPATIENT)
Age: 39
End: 2024-06-17

## 2024-06-26 RX ORDER — SACUBITRIL AND VALSARTAN 24; 26 MG/1; MG/1
24-26 TABLET, FILM COATED ORAL TWICE DAILY
Refills: 0 | Status: ACTIVE | COMMUNITY
Start: 2022-10-25

## 2024-06-28 ENCOUNTER — APPOINTMENT (OUTPATIENT)
Dept: HEART FAILURE | Facility: CLINIC | Age: 39
End: 2024-06-28

## 2024-06-28 VITALS
OXYGEN SATURATION: 100 % | HEIGHT: 61 IN | BODY MASS INDEX: 22.84 KG/M2 | TEMPERATURE: 98 F | HEART RATE: 84 BPM | DIASTOLIC BLOOD PRESSURE: 79 MMHG | SYSTOLIC BLOOD PRESSURE: 112 MMHG | WEIGHT: 121 LBS

## 2024-06-28 DIAGNOSIS — I50.20 UNSPECIFIED SYSTOLIC (CONGESTIVE) HEART FAILURE: ICD-10-CM

## 2024-06-28 PROCEDURE — 99214 OFFICE O/P EST MOD 30 MIN: CPT

## 2024-06-28 PROCEDURE — G2211 COMPLEX E/M VISIT ADD ON: CPT | Mod: NC,1L

## 2024-07-02 PROBLEM — I50.20 ACC/AHA STAGE C HEART FAILURE WITH REDUCED EJECTION FRACTION: Status: ACTIVE | Noted: 2022-10-25

## 2024-08-22 ENCOUNTER — APPOINTMENT (OUTPATIENT)
Dept: CV DIAGNOSITCS | Facility: HOSPITAL | Age: 39
End: 2024-08-22

## 2024-09-05 ENCOUNTER — RESULT REVIEW (OUTPATIENT)
Age: 39
End: 2024-09-05

## 2024-09-05 ENCOUNTER — APPOINTMENT (OUTPATIENT)
Dept: CV DIAGNOSITCS | Facility: HOSPITAL | Age: 39
End: 2024-09-05

## 2024-09-05 ENCOUNTER — OUTPATIENT (OUTPATIENT)
Dept: OUTPATIENT SERVICES | Facility: HOSPITAL | Age: 39
LOS: 1 days | End: 2024-09-05

## 2024-09-05 DIAGNOSIS — I50.20 UNSPECIFIED SYSTOLIC (CONGESTIVE) HEART FAILURE: ICD-10-CM

## 2024-09-05 DIAGNOSIS — Z98.891 HISTORY OF UTERINE SCAR FROM PREVIOUS SURGERY: Chronic | ICD-10-CM

## 2024-09-05 PROCEDURE — 93351 STRESS TTE COMPLETE: CPT | Mod: 26

## 2024-09-05 PROCEDURE — C8930: CPT

## 2024-09-19 ENCOUNTER — NON-APPOINTMENT (OUTPATIENT)
Age: 39
End: 2024-09-19

## 2024-09-20 ENCOUNTER — APPOINTMENT (OUTPATIENT)
Dept: HEART FAILURE | Facility: CLINIC | Age: 39
End: 2024-09-20

## 2024-09-20 VITALS
HEIGHT: 61 IN | SYSTOLIC BLOOD PRESSURE: 119 MMHG | TEMPERATURE: 98.2 F | OXYGEN SATURATION: 99 % | HEART RATE: 98 BPM | WEIGHT: 120 LBS | DIASTOLIC BLOOD PRESSURE: 84 MMHG | BODY MASS INDEX: 22.66 KG/M2

## 2024-09-20 LAB
ANION GAP SERPL CALC-SCNC: 12 MMOL/L
BUN SERPL-MCNC: 9 MG/DL
CALCIUM SERPL-MCNC: 8.8 MG/DL
CHLORIDE SERPL-SCNC: 107 MMOL/L
CO2 SERPL-SCNC: 22 MMOL/L
CREAT SERPL-MCNC: 0.61 MG/DL
EGFR: 118 ML/MIN/1.73M2
GLUCOSE SERPL-MCNC: 92 MG/DL
NT-PROBNP SERPL-MCNC: 38 PG/ML
POTASSIUM SERPL-SCNC: 3.9 MMOL/L
SODIUM SERPL-SCNC: 141 MMOL/L

## 2024-09-20 PROCEDURE — 99214 OFFICE O/P EST MOD 30 MIN: CPT

## 2024-09-20 PROCEDURE — G2211 COMPLEX E/M VISIT ADD ON: CPT | Mod: NC

## 2024-09-20 NOTE — PHYSICAL EXAM
[Well Developed] : well developed [Well Nourished] : well nourished [No Acute Distress] : no acute distress [No Xanthelasma] : no xanthelasma [Normal Venous Pressure] : normal venous pressure [Normal S1, S2] : normal S1, S2 [No Murmur] : no murmur [No Rub] : no rub [No Gallop] : no gallop [Clear Lung Fields] : clear lung fields [Good Air Entry] : good air entry [No Respiratory Distress] : no respiratory distress  [Soft] : abdomen soft [Non Tender] : non-tender [Normal Bowel Sounds] : normal bowel sounds [No Edema] : no edema [Normal Radial B/L] : normal radial B/L [Normal] : alert and oriented, normal memory [de-identified] : JVP < 6cm H2O [de-identified] : warm peripherally

## 2024-09-20 NOTE — ASSESSMENT
[FreeTextEntry1] : Ms. Roberson is a 38-year-old woman with peripartum cardiomyopathy, diagnosed with HFrEF in October 2022 following birth of her first child. LVEF initially improved to 40-45% with GDMT from prior 15-20% but then declined to 35%. Most recent TTE show LVEF of 43%. Other medical history includes gestational HTN, gestational DM, asthma and COVID infection 8/2022. She presents today for routine follow-up of her cardiomyopathy.   Despite being over a year post partum, her LV function has not normalized. Hypotension and lightheadedness has precluded her from tolerating up titration of GDMT and she remains on only low dose. She is ACC/AHA Stage C, NYHA class I-II, low euvolemic, normotensive today on exam. She remains symptomatic on low doses of GDMT and recent TTE remains with unchanged EF. Ultimately would favor cMRI however she has difficult IV access and prefers to avoid.    # HFrEF - Etiology: Peripartum. TTE with reduced LVEF from 40% to 35% but with positive remodeling and prior noted regional WMA no longer seen. Most recent LVEF 43% 11/3/23.  - GDMT: Will continue Entresto 24/26mg BID and Spironolactone 12.5 mg QD.  - Not on farxiga at this time d/t persistent dizziness/LH - Diuretics: no loop diuretic requirement, instructed to hydrate with electrolyte drinks such as Gatorade, liquid IV, or Pedialyte - Device: No indication given LVEF > 35% - Had previously discussed cMRI to further evaluate cardiac function, but patient is hesitant due to fear of IVs. Given chest pain, will further evaluate with exercise stress echo. - LABS 04/19/2024 : K 4.4, BUN/Cr 11/0.6, proBNP 59.   # Peripartum cardiomyopathy - GDMT as above - discussed importance of birth control as another pregnancy prior to recovery of her cardiomyopathy would not be recommended. She is currently not seeking pregnancy at this time though would desire another pregnancy in the future if possible.  # Anxiety - Previously offered behavioral health referral to Dr. Rosalia Park; which she would like to consider. Strongly encouraged utilization of this resource. She has previously been provided with name and contact information.   Follow up with Dr. Mederos in 3 months

## 2024-09-20 NOTE — CARDIOLOGY SUMMARY
[de-identified] : 4/25/24 TTE: LVIDd 4.5, LVEF 44%, global LV hypokinesis, mld grade I DD, RV normal size/function, TAPSE 2.0, mld MR, trace TR,   11/3/23 TTE: LVEF 43%, normal size, normal RV size and function, TAPSE 1.6, normal valves, RAP 3, PASP 16  7/13/23 TTE: LVIDd 4.7cm, LVEF 35%, no regional WMA, normal RVSF, TAPSE 1.8, normal BiAtria, trace MR, trace TR, no PASP/RVSP calculated.  TTE 2/23/23 TTE: LVIDd 5.1, LVEF 40-45%, mod decreased LVSF, hypokinesis of the anterior wall and apex, w/ Akinesis of the interventricular septum, TAPSE 1.9, mild MR, trace AR, min TR,   10/12/22 TTE: LVEF 15-20%, LVIDd 5.6cm, grade III-IV DD, nl RV size and function, mild MR, mild TR.

## 2024-09-20 NOTE — HISTORY OF PRESENT ILLNESS
[FreeTextEntry1] : Ms. Roberson is a 38-year-old woman with peripartum cardiomyopathy, diagnosed with HFrEF in October 2022 following birth of her first child. LVEF initially improved to 40-45% with GDMT from prior 15-20% but then declined to 35%. Most recent TTE 11/2023 showed LVEF of 43%. Other medical history includes gestational HTN, gestational DM, asthma and COVID infection 8/2022. She presents today for routine follow-up of her cardiomyopathy.   TTE in July 2023 demonstrated decline of LVEF to 35% from prior improved 40-45% though there was evidence of positive remodeling and prior noted regional WMA no longer seen. GDMT further escalated, though remains at low dose due to prior limitations with symptomatic marginal BP. Most recent echocardiogram in 11/2023 with EF 43%.  Last seen in 11/2023 and was recommended to start farxiga 5mg QD. However, was unable to tolerate it with report of dizziness/lightheaded and didn't feel well. Ultimately stopped farxiga and maintained on Entresto 12/13 AM / 24/26mg PM dose, and luis 12.5mg QD  Since the last visit, she has been able to increase Entresto to full tablet BID. She reports ongoing occasional episodes of LH/Dizziness associated with low normal SBP readings which occur before taking morning medications. She states her home BP readings have ranged 's and weight has been stable ranging. She continues to target adequate fluid intake and states her appetite has been good. She denies any changes in AT and otherwise denies CP, SOB at rest, orthopnea, PND, LH/dizziness, abdominal discomfort, LE edema, palpitations, and syncope.

## 2024-10-08 NOTE — ED ADULT NURSE NOTE - NURSING NEURO LEVEL OF CONSCIOUSNESS
clear to auscultation bilaterally, good air movement, Equal expansion b/l alert and awake/follows commands

## 2025-01-24 ENCOUNTER — APPOINTMENT (OUTPATIENT)
Dept: HEART FAILURE | Facility: CLINIC | Age: 40
End: 2025-01-24

## 2025-01-24 VITALS
HEART RATE: 78 BPM | SYSTOLIC BLOOD PRESSURE: 109 MMHG | DIASTOLIC BLOOD PRESSURE: 70 MMHG | TEMPERATURE: 98.6 F | BODY MASS INDEX: 22.84 KG/M2 | HEIGHT: 61 IN | OXYGEN SATURATION: 99 % | WEIGHT: 121 LBS

## 2025-01-24 LAB
ANION GAP SERPL CALC-SCNC: 9 MMOL/L
BUN SERPL-MCNC: 12 MG/DL
CALCIUM SERPL-MCNC: 8.9 MG/DL
CHLORIDE SERPL-SCNC: 107 MMOL/L
CO2 SERPL-SCNC: 23 MMOL/L
CREAT SERPL-MCNC: 0.61 MG/DL
EGFR: 117 ML/MIN/1.73M2
GLUCOSE SERPL-MCNC: 87 MG/DL
POTASSIUM SERPL-SCNC: 4.7 MMOL/L
SODIUM SERPL-SCNC: 139 MMOL/L

## 2025-01-24 PROCEDURE — 99214 OFFICE O/P EST MOD 30 MIN: CPT

## 2025-01-24 PROCEDURE — G2211 COMPLEX E/M VISIT ADD ON: CPT | Mod: NC

## 2025-01-24 RX ORDER — LOSARTAN POTASSIUM 25 MG/1
25 TABLET, FILM COATED ORAL
Qty: 2 | Refills: 1 | Status: ACTIVE | COMMUNITY
Start: 2025-01-24 | End: 1900-01-01

## 2025-01-26 LAB — NT-PROBNP SERPL-MCNC: <36 PG/ML

## 2025-02-03 ENCOUNTER — APPOINTMENT (OUTPATIENT)
Dept: HEART FAILURE | Facility: CLINIC | Age: 40
End: 2025-02-03

## 2025-02-03 PROCEDURE — 99213 OFFICE O/P EST LOW 20 MIN: CPT | Mod: 93

## 2025-04-24 ENCOUNTER — APPOINTMENT (OUTPATIENT)
Dept: CV DIAGNOSITCS | Facility: HOSPITAL | Age: 40
End: 2025-04-24

## 2025-04-24 ENCOUNTER — OUTPATIENT (OUTPATIENT)
Dept: OUTPATIENT SERVICES | Facility: HOSPITAL | Age: 40
LOS: 1 days | End: 2025-04-24
Payer: MEDICAID

## 2025-04-24 ENCOUNTER — RESULT REVIEW (OUTPATIENT)
Age: 40
End: 2025-04-24

## 2025-04-24 DIAGNOSIS — Z98.891 HISTORY OF UTERINE SCAR FROM PREVIOUS SURGERY: Chronic | ICD-10-CM

## 2025-04-24 DIAGNOSIS — I50.20 UNSPECIFIED SYSTOLIC (CONGESTIVE) HEART FAILURE: ICD-10-CM

## 2025-04-24 PROCEDURE — 93306 TTE W/DOPPLER COMPLETE: CPT | Mod: 26

## 2025-04-24 PROCEDURE — 93306 TTE W/DOPPLER COMPLETE: CPT

## 2025-05-09 ENCOUNTER — APPOINTMENT (OUTPATIENT)
Dept: HEART FAILURE | Facility: CLINIC | Age: 40
End: 2025-05-09

## 2025-05-09 VITALS
WEIGHT: 118 LBS | SYSTOLIC BLOOD PRESSURE: 115 MMHG | HEART RATE: 88 BPM | OXYGEN SATURATION: 99 % | HEIGHT: 61 IN | DIASTOLIC BLOOD PRESSURE: 73 MMHG | TEMPERATURE: 98.1 F | BODY MASS INDEX: 22.28 KG/M2

## 2025-05-09 DIAGNOSIS — I50.20 UNSPECIFIED SYSTOLIC (CONGESTIVE) HEART FAILURE: ICD-10-CM

## 2025-05-09 PROCEDURE — G2211 COMPLEX E/M VISIT ADD ON: CPT | Mod: NC

## 2025-05-09 PROCEDURE — 99214 OFFICE O/P EST MOD 30 MIN: CPT

## 2025-05-09 RX ORDER — DAPAGLIFLOZIN 5 MG/1
5 TABLET, FILM COATED ORAL DAILY
Qty: 30 | Refills: 2 | Status: ACTIVE | COMMUNITY
Start: 2025-05-09 | End: 1900-01-01

## 2025-05-28 ENCOUNTER — APPOINTMENT (OUTPATIENT)
Dept: HEART FAILURE | Facility: CLINIC | Age: 40
End: 2025-05-28

## 2025-05-28 PROCEDURE — 99214 OFFICE O/P EST MOD 30 MIN: CPT | Mod: 93

## 2025-06-26 ENCOUNTER — APPOINTMENT (OUTPATIENT)
Dept: HEART FAILURE | Facility: CLINIC | Age: 40
End: 2025-06-26
Payer: MEDICAID

## 2025-06-26 PROBLEM — R42 DIZZINESS: Status: ACTIVE | Noted: 2025-06-26

## 2025-06-26 PROCEDURE — 99214 OFFICE O/P EST MOD 30 MIN: CPT | Mod: 93
